# Patient Record
Sex: MALE | Race: WHITE | Employment: OTHER | URBAN - METROPOLITAN AREA
[De-identification: names, ages, dates, MRNs, and addresses within clinical notes are randomized per-mention and may not be internally consistent; named-entity substitution may affect disease eponyms.]

---

## 2019-09-04 ENCOUNTER — HOSPITAL ENCOUNTER (INPATIENT)
Age: 64
LOS: 5 days | Discharge: HOME | DRG: 418 | End: 2019-09-09
Attending: EMERGENCY MEDICINE | Admitting: INTERNAL MEDICINE
Payer: COMMERCIAL

## 2019-09-04 ENCOUNTER — APPOINTMENT (OUTPATIENT)
Dept: ULTRASOUND IMAGING | Age: 64
DRG: 418 | End: 2019-09-04
Payer: COMMERCIAL

## 2019-09-04 ENCOUNTER — APPOINTMENT (OUTPATIENT)
Dept: CT IMAGING | Age: 64
DRG: 418 | End: 2019-09-04
Payer: COMMERCIAL

## 2019-09-04 ENCOUNTER — APPOINTMENT (OUTPATIENT)
Dept: GENERAL RADIOLOGY | Age: 64
DRG: 418 | End: 2019-09-04
Payer: COMMERCIAL

## 2019-09-04 DIAGNOSIS — K85.90 PANCREATITIS, UNSPECIFIED PANCREATITIS TYPE: ICD-10-CM

## 2019-09-04 DIAGNOSIS — K29.90 GASTRITIS AND DUODENITIS: ICD-10-CM

## 2019-09-04 DIAGNOSIS — D72.829 LEUKOCYTOSIS, UNSPECIFIED TYPE: ICD-10-CM

## 2019-09-04 DIAGNOSIS — K85.90 ACUTE PANCREATITIS, UNSPECIFIED COMPLICATION STATUS, UNSPECIFIED PANCREATITIS TYPE: Primary | ICD-10-CM

## 2019-09-04 LAB
ALBUMIN SERPL-MCNC: 4.2 G/DL (ref 3.5–5.2)
ALP BLD-CCNC: 224 U/L (ref 40–129)
ALT SERPL-CCNC: 205 U/L (ref 0–40)
ANION GAP SERPL CALCULATED.3IONS-SCNC: 14 MMOL/L (ref 7–16)
AST SERPL-CCNC: 285 U/L (ref 0–39)
BASOPHILS ABSOLUTE: 0.11 E9/L (ref 0–0.2)
BASOPHILS RELATIVE PERCENT: 0.3 % (ref 0–2)
BILIRUB SERPL-MCNC: 2.4 MG/DL (ref 0–1.2)
BILIRUBIN DIRECT: 1.3 MG/DL (ref 0–0.3)
BILIRUBIN, INDIRECT: 1.1 MG/DL (ref 0–1)
BUN BLDV-MCNC: 19 MG/DL (ref 8–23)
CALCIUM SERPL-MCNC: 9.6 MG/DL (ref 8.6–10.2)
CHLORIDE BLD-SCNC: 95 MMOL/L (ref 98–107)
CO2: 29 MMOL/L (ref 22–29)
CREAT SERPL-MCNC: 0.7 MG/DL (ref 0.7–1.2)
D DIMER: >5250 NG/ML DDU
EOSINOPHILS ABSOLUTE: 0.07 E9/L (ref 0.05–0.5)
EOSINOPHILS RELATIVE PERCENT: 0.2 % (ref 0–6)
GFR AFRICAN AMERICAN: >60
GFR NON-AFRICAN AMERICAN: >60 ML/MIN/1.73
GLUCOSE BLD-MCNC: 227 MG/DL (ref 74–99)
HCT VFR BLD CALC: 58.5 % (ref 37–54)
HEMOGLOBIN: 19.2 G/DL (ref 12.5–16.5)
IMMATURE GRANULOCYTES #: 0.28 E9/L
IMMATURE GRANULOCYTES %: 0.9 % (ref 0–5)
LACTIC ACID: 2.6 MMOL/L (ref 0.5–2.2)
LIPASE: >3000 U/L (ref 13–60)
LYMPHOCYTES ABSOLUTE: 1.73 E9/L (ref 1.5–4)
LYMPHOCYTES RELATIVE PERCENT: 5.4 % (ref 20–42)
MCH RBC QN AUTO: 30 PG (ref 26–35)
MCHC RBC AUTO-ENTMCNC: 32.8 % (ref 32–34.5)
MCV RBC AUTO: 91.5 FL (ref 80–99.9)
MONOCYTES ABSOLUTE: 1.53 E9/L (ref 0.1–0.95)
MONOCYTES RELATIVE PERCENT: 4.8 % (ref 2–12)
NEUTROPHILS ABSOLUTE: 28.06 E9/L (ref 1.8–7.3)
NEUTROPHILS RELATIVE PERCENT: 88.4 % (ref 43–80)
PDW BLD-RTO: 12.5 FL (ref 11.5–15)
PLATELET # BLD: 202 E9/L (ref 130–450)
PMV BLD AUTO: 10.7 FL (ref 7–12)
POTASSIUM REFLEX MAGNESIUM: 5.5 MMOL/L (ref 3.5–5)
RBC # BLD: 6.39 E12/L (ref 3.8–5.8)
SODIUM BLD-SCNC: 138 MMOL/L (ref 132–146)
TOTAL PROTEIN: 7.5 G/DL (ref 6.4–8.3)
TROPONIN: <0.01 NG/ML (ref 0–0.03)
WBC # BLD: 31.8 E9/L (ref 4.5–11.5)

## 2019-09-04 PROCEDURE — 2580000003 HC RX 258: Performed by: INTERNAL MEDICINE

## 2019-09-04 PROCEDURE — 6360000004 HC RX CONTRAST MEDICATION: Performed by: RADIOLOGY

## 2019-09-04 PROCEDURE — 2580000003 HC RX 258: Performed by: EMERGENCY MEDICINE

## 2019-09-04 PROCEDURE — 71275 CT ANGIOGRAPHY CHEST: CPT

## 2019-09-04 PROCEDURE — 80048 BASIC METABOLIC PNL TOTAL CA: CPT

## 2019-09-04 PROCEDURE — 2500000003 HC RX 250 WO HCPCS: Performed by: EMERGENCY MEDICINE

## 2019-09-04 PROCEDURE — 6370000000 HC RX 637 (ALT 250 FOR IP): Performed by: EMERGENCY MEDICINE

## 2019-09-04 PROCEDURE — 96366 THER/PROPH/DIAG IV INF ADDON: CPT

## 2019-09-04 PROCEDURE — 2580000003 HC RX 258: Performed by: RADIOLOGY

## 2019-09-04 PROCEDURE — 6360000002 HC RX W HCPCS: Performed by: EMERGENCY MEDICINE

## 2019-09-04 PROCEDURE — 87040 BLOOD CULTURE FOR BACTERIA: CPT

## 2019-09-04 PROCEDURE — 83605 ASSAY OF LACTIC ACID: CPT

## 2019-09-04 PROCEDURE — 74177 CT ABD & PELVIS W/CONTRAST: CPT

## 2019-09-04 PROCEDURE — 2060000000 HC ICU INTERMEDIATE R&B

## 2019-09-04 PROCEDURE — 93005 ELECTROCARDIOGRAM TRACING: CPT | Performed by: EMERGENCY MEDICINE

## 2019-09-04 PROCEDURE — 96365 THER/PROPH/DIAG IV INF INIT: CPT

## 2019-09-04 PROCEDURE — 96375 TX/PRO/DX INJ NEW DRUG ADDON: CPT

## 2019-09-04 PROCEDURE — 85378 FIBRIN DEGRADE SEMIQUANT: CPT

## 2019-09-04 PROCEDURE — 36415 COLL VENOUS BLD VENIPUNCTURE: CPT

## 2019-09-04 PROCEDURE — 80076 HEPATIC FUNCTION PANEL: CPT

## 2019-09-04 PROCEDURE — 85025 COMPLETE CBC W/AUTO DIFF WBC: CPT

## 2019-09-04 PROCEDURE — 94664 DEMO&/EVAL PT USE INHALER: CPT

## 2019-09-04 PROCEDURE — 6360000002 HC RX W HCPCS: Performed by: INTERNAL MEDICINE

## 2019-09-04 PROCEDURE — 83690 ASSAY OF LIPASE: CPT

## 2019-09-04 PROCEDURE — 71046 X-RAY EXAM CHEST 2 VIEWS: CPT

## 2019-09-04 PROCEDURE — 96367 TX/PROPH/DG ADDL SEQ IV INF: CPT

## 2019-09-04 PROCEDURE — 99285 EMERGENCY DEPT VISIT HI MDM: CPT

## 2019-09-04 PROCEDURE — 84484 ASSAY OF TROPONIN QUANT: CPT

## 2019-09-04 PROCEDURE — 76705 ECHO EXAM OF ABDOMEN: CPT

## 2019-09-04 RX ORDER — 0.9 % SODIUM CHLORIDE 0.9 %
1000 INTRAVENOUS SOLUTION INTRAVENOUS ONCE
Status: COMPLETED | OUTPATIENT
Start: 2019-09-04 | End: 2019-09-04

## 2019-09-04 RX ORDER — SODIUM CHLORIDE 9 MG/ML
INJECTION, SOLUTION INTRAVENOUS CONTINUOUS
Status: DISCONTINUED | OUTPATIENT
Start: 2019-09-04 | End: 2019-09-05

## 2019-09-04 RX ORDER — HYDRALAZINE HYDROCHLORIDE 20 MG/ML
10 INJECTION INTRAMUSCULAR; INTRAVENOUS EVERY 6 HOURS PRN
Status: DISCONTINUED | OUTPATIENT
Start: 2019-09-04 | End: 2019-09-09 | Stop reason: HOSPADM

## 2019-09-04 RX ORDER — ALBUTEROL SULFATE 90 UG/1
2 AEROSOL, METERED RESPIRATORY (INHALATION) EVERY 6 HOURS PRN
Status: DISCONTINUED | OUTPATIENT
Start: 2019-09-04 | End: 2019-09-06

## 2019-09-04 RX ORDER — LABETALOL HYDROCHLORIDE 5 MG/ML
10 INJECTION, SOLUTION INTRAVENOUS EVERY 4 HOURS PRN
Status: DISCONTINUED | OUTPATIENT
Start: 2019-09-04 | End: 2019-09-09 | Stop reason: HOSPADM

## 2019-09-04 RX ORDER — SODIUM CHLORIDE 0.9 % (FLUSH) 0.9 %
10 SYRINGE (ML) INJECTION ONCE
Status: COMPLETED | OUTPATIENT
Start: 2019-09-04 | End: 2019-09-04

## 2019-09-04 RX ORDER — MORPHINE SULFATE 2 MG/ML
2 INJECTION, SOLUTION INTRAMUSCULAR; INTRAVENOUS EVERY 4 HOURS PRN
Status: DISCONTINUED | OUTPATIENT
Start: 2019-09-04 | End: 2019-09-05

## 2019-09-04 RX ORDER — ALBUTEROL SULFATE 2.5 MG/3ML
2.5 SOLUTION RESPIRATORY (INHALATION) EVERY 4 HOURS PRN
Status: DISCONTINUED | OUTPATIENT
Start: 2019-09-04 | End: 2019-09-09 | Stop reason: HOSPADM

## 2019-09-04 RX ORDER — LORAZEPAM 2 MG/ML
0.5 INJECTION INTRAMUSCULAR EVERY 6 HOURS PRN
Status: DISCONTINUED | OUTPATIENT
Start: 2019-09-04 | End: 2019-09-09 | Stop reason: HOSPADM

## 2019-09-04 RX ORDER — KETOROLAC TROMETHAMINE 30 MG/ML
15 INJECTION, SOLUTION INTRAMUSCULAR; INTRAVENOUS ONCE
Status: COMPLETED | OUTPATIENT
Start: 2019-09-04 | End: 2019-09-04

## 2019-09-04 RX ORDER — FENTANYL CITRATE 50 UG/ML
50 INJECTION, SOLUTION INTRAMUSCULAR; INTRAVENOUS ONCE
Status: COMPLETED | OUTPATIENT
Start: 2019-09-04 | End: 2019-09-04

## 2019-09-04 RX ORDER — NICOTINE 21 MG/24HR
1 PATCH, TRANSDERMAL 24 HOURS TRANSDERMAL ONCE
Status: COMPLETED | OUTPATIENT
Start: 2019-09-04 | End: 2019-09-05

## 2019-09-04 RX ORDER — 0.9 % SODIUM CHLORIDE 0.9 %
1000 INTRAVENOUS SOLUTION INTRAVENOUS ONCE
Status: COMPLETED | OUTPATIENT
Start: 2019-09-04 | End: 2019-09-05

## 2019-09-04 RX ORDER — ONDANSETRON 2 MG/ML
4 INJECTION INTRAMUSCULAR; INTRAVENOUS EVERY 6 HOURS PRN
Status: DISCONTINUED | OUTPATIENT
Start: 2019-09-04 | End: 2019-09-09 | Stop reason: HOSPADM

## 2019-09-04 RX ADMIN — MORPHINE SULFATE 2 MG: 2 INJECTION, SOLUTION INTRAMUSCULAR; INTRAVENOUS at 23:27

## 2019-09-04 RX ADMIN — Medication 10 ML: at 16:46

## 2019-09-04 RX ADMIN — IPRATROPIUM BROMIDE 0.5 MG: 0.5 SOLUTION RESPIRATORY (INHALATION) at 15:29

## 2019-09-04 RX ADMIN — KETOROLAC TROMETHAMINE 15 MG: 30 INJECTION, SOLUTION INTRAMUSCULAR; INTRAVENOUS at 15:32

## 2019-09-04 RX ADMIN — PIPERACILLIN SODIUM AND TAZOBACTAM SODIUM 3.38 G: 3; .375 INJECTION, POWDER, LYOPHILIZED, FOR SOLUTION INTRAVENOUS at 23:09

## 2019-09-04 RX ADMIN — FENTANYL CITRATE 50 MCG: 50 INJECTION INTRAMUSCULAR; INTRAVENOUS at 21:38

## 2019-09-04 RX ADMIN — SODIUM CHLORIDE 1000 ML: 9 INJECTION, SOLUTION INTRAVENOUS at 21:48

## 2019-09-04 RX ADMIN — IOPAMIDOL 110 ML: 755 INJECTION, SOLUTION INTRAVENOUS at 16:46

## 2019-09-04 RX ADMIN — SODIUM CHLORIDE: 9 INJECTION, SOLUTION INTRAVENOUS at 23:09

## 2019-09-04 RX ADMIN — LORAZEPAM 0.5 MG: 2 INJECTION INTRAMUSCULAR; INTRAVENOUS at 23:09

## 2019-09-04 RX ADMIN — ALBUTEROL SULFATE 2.5 MG: 2.5 SOLUTION RESPIRATORY (INHALATION) at 15:29

## 2019-09-04 RX ADMIN — CEFEPIME HYDROCHLORIDE 2 G: 2 INJECTION, POWDER, FOR SOLUTION INTRAVENOUS at 18:39

## 2019-09-04 RX ADMIN — SODIUM CHLORIDE 1000 ML: 9 INJECTION, SOLUTION INTRAVENOUS at 18:39

## 2019-09-04 RX ADMIN — METRONIDAZOLE 500 MG: 500 INJECTION, SOLUTION INTRAVENOUS at 21:42

## 2019-09-04 ASSESSMENT — PAIN DESCRIPTION - FREQUENCY
FREQUENCY: CONTINUOUS
FREQUENCY: CONTINUOUS

## 2019-09-04 ASSESSMENT — PAIN SCALES - GENERAL
PAINLEVEL_OUTOF10: 8
PAINLEVEL_OUTOF10: 5
PAINLEVEL_OUTOF10: 9
PAINLEVEL_OUTOF10: 5
PAINLEVEL_OUTOF10: 8
PAINLEVEL_OUTOF10: 9

## 2019-09-04 ASSESSMENT — PAIN DESCRIPTION - PAIN TYPE
TYPE: ACUTE PAIN
TYPE: ACUTE PAIN

## 2019-09-04 ASSESSMENT — PAIN DESCRIPTION - ORIENTATION
ORIENTATION: LEFT;UPPER;MID
ORIENTATION: UPPER;MID

## 2019-09-04 ASSESSMENT — PAIN DESCRIPTION - DESCRIPTORS
DESCRIPTORS: PRESSURE
DESCRIPTORS: ACHING;BURNING

## 2019-09-04 ASSESSMENT — PAIN DESCRIPTION - ONSET
ONSET: ON-GOING
ONSET: ON-GOING

## 2019-09-04 ASSESSMENT — PAIN DESCRIPTION - LOCATION
LOCATION: ABDOMEN;CHEST
LOCATION: ABDOMEN

## 2019-09-04 ASSESSMENT — PAIN DESCRIPTION - PROGRESSION: CLINICAL_PROGRESSION: NOT CHANGED

## 2019-09-04 NOTE — ED PROVIDER NOTES
There may also be cholecystitis with a   distended inflamed gallbladder. Multiple infiltrative hypodense lesions in the right hepatic lobe for   which benign and malignant etiologies are considered. Further   assessment and surveillance by MRI or PET/CT scan is recommended. CTA CHEST W CONTRAST   Final Result   No evidence of pulmonary embolism, organized pneumonia, pleural   effusion, or cardiac decompensation. Noncalcified and calcified granulomatous scarring is noted in the   lungs, with both lungs having nodules measuring up to 7 mm diameter. [place] criteria would recommend reevaluation in 6-12 months   to document stability. Mild aortic aneurysmal dilation without acute complications. Maximum   diameter measurements are currently in the 3.5 cm range. XR CHEST STANDARD (2 VW)   Final Result   NO ACUTE CARDIOPULMONARY PROCESS      Chronic obstructive pulmonary disease                EKG:  This EKG is signed and interpreted by the EP. Time: 1515  Rate: 71  Rhythm: normal sinus with occasional PACs   Interpretation: no acute changes  Comparison: no previous EKG available    ------------------------- NURSING NOTES AND VITALS REVIEWED ---------------------------   The nursing notes within the ED encounter and vital signs as below have been reviewed by myself. BP (!) 189/90   Pulse 67   Temp 97.8 °F (36.6 °C) (Temporal)   Resp 18   Ht 5' 11\" (1.803 m)   Wt 182 lb 14.4 oz (83 kg)   SpO2 97%   BMI 25.51 kg/m²   Oxygen Saturation Interpretation: Normal    The patients available past medical records and past encounters were reviewed.         ------------------------------ ED COURSE/MEDICAL DECISION MAKING----------------------  Medications   albuterol (PROVENTIL) nebulizer solution 2.5 mg (2.5 mg Nebulization Given 9/4/19 1049)   0.9 % sodium chloride bolus (1,000 mLs Intravenous New Bag 9/4/19 8306)   0.9 % sodium chloride infusion ( Intravenous New Bag 9/4/19 3849)

## 2019-09-05 LAB
ALBUMIN SERPL-MCNC: 3.3 G/DL (ref 3.5–5.2)
ALP BLD-CCNC: 172 U/L (ref 40–129)
ALT SERPL-CCNC: 462 U/L (ref 0–40)
ANION GAP SERPL CALCULATED.3IONS-SCNC: 12 MMOL/L (ref 7–16)
AST SERPL-CCNC: 409 U/L (ref 0–39)
BILIRUB SERPL-MCNC: 1 MG/DL (ref 0–1.2)
BUN BLDV-MCNC: 19 MG/DL (ref 8–23)
CALCIUM SERPL-MCNC: 8.7 MG/DL (ref 8.6–10.2)
CHLORIDE BLD-SCNC: 102 MMOL/L (ref 98–107)
CO2: 25 MMOL/L (ref 22–29)
CREAT SERPL-MCNC: 0.6 MG/DL (ref 0.7–1.2)
GFR AFRICAN AMERICAN: >60
GFR NON-AFRICAN AMERICAN: >60 ML/MIN/1.73
GLUCOSE BLD-MCNC: 110 MG/DL (ref 74–99)
HCT VFR BLD CALC: 45.5 % (ref 37–54)
HEMOGLOBIN: 14.8 G/DL (ref 12.5–16.5)
LACTIC ACID: 0.8 MMOL/L (ref 0.5–2.2)
LACTIC ACID: 0.8 MMOL/L (ref 0.5–2.2)
LACTIC ACID: 0.9 MMOL/L (ref 0.5–2.2)
LACTIC ACID: 1.2 MMOL/L (ref 0.5–2.2)
LIPASE: 136 U/L (ref 13–60)
MCH RBC QN AUTO: 29.7 PG (ref 26–35)
MCHC RBC AUTO-ENTMCNC: 32.5 % (ref 32–34.5)
MCV RBC AUTO: 91.4 FL (ref 80–99.9)
PDW BLD-RTO: 12.9 FL (ref 11.5–15)
PLATELET # BLD: 113 E9/L (ref 130–450)
PMV BLD AUTO: 10.6 FL (ref 7–12)
POTASSIUM SERPL-SCNC: 4.5 MMOL/L (ref 3.5–5)
PROCALCITONIN: 2.64 NG/ML (ref 0–0.08)
RBC # BLD: 4.98 E12/L (ref 3.8–5.8)
SODIUM BLD-SCNC: 139 MMOL/L (ref 132–146)
TOTAL PROTEIN: 5.5 G/DL (ref 6.4–8.3)
WBC # BLD: 11.5 E9/L (ref 4.5–11.5)

## 2019-09-05 PROCEDURE — 85027 COMPLETE CBC AUTOMATED: CPT

## 2019-09-05 PROCEDURE — 6370000000 HC RX 637 (ALT 250 FOR IP): Performed by: INTERNAL MEDICINE

## 2019-09-05 PROCEDURE — 83690 ASSAY OF LIPASE: CPT

## 2019-09-05 PROCEDURE — 36415 COLL VENOUS BLD VENIPUNCTURE: CPT

## 2019-09-05 PROCEDURE — 6360000002 HC RX W HCPCS: Performed by: SURGERY

## 2019-09-05 PROCEDURE — 2060000000 HC ICU INTERMEDIATE R&B

## 2019-09-05 PROCEDURE — 80053 COMPREHEN METABOLIC PANEL: CPT

## 2019-09-05 PROCEDURE — 83605 ASSAY OF LACTIC ACID: CPT

## 2019-09-05 PROCEDURE — 84145 PROCALCITONIN (PCT): CPT

## 2019-09-05 PROCEDURE — 2580000003 HC RX 258: Performed by: SURGERY

## 2019-09-05 PROCEDURE — 6360000002 HC RX W HCPCS: Performed by: INTERNAL MEDICINE

## 2019-09-05 PROCEDURE — 2580000003 HC RX 258: Performed by: INTERNAL MEDICINE

## 2019-09-05 RX ORDER — SODIUM CHLORIDE, SODIUM LACTATE, POTASSIUM CHLORIDE, CALCIUM CHLORIDE 600; 310; 30; 20 MG/100ML; MG/100ML; MG/100ML; MG/100ML
INJECTION, SOLUTION INTRAVENOUS CONTINUOUS
Status: DISCONTINUED | OUTPATIENT
Start: 2019-09-05 | End: 2019-09-09 | Stop reason: HOSPADM

## 2019-09-05 RX ORDER — NICOTINE 21 MG/24HR
1 PATCH, TRANSDERMAL 24 HOURS TRANSDERMAL DAILY
Status: DISCONTINUED | OUTPATIENT
Start: 2019-09-05 | End: 2019-09-09 | Stop reason: HOSPADM

## 2019-09-05 RX ORDER — KETOROLAC TROMETHAMINE 30 MG/ML
15 INJECTION, SOLUTION INTRAMUSCULAR; INTRAVENOUS EVERY 6 HOURS PRN
Status: DISPENSED | OUTPATIENT
Start: 2019-09-05 | End: 2019-09-08

## 2019-09-05 RX ADMIN — PIPERACILLIN SODIUM AND TAZOBACTAM SODIUM 3.38 G: 3; .375 INJECTION, POWDER, LYOPHILIZED, FOR SOLUTION INTRAVENOUS at 06:28

## 2019-09-05 RX ADMIN — SODIUM CHLORIDE, POTASSIUM CHLORIDE, SODIUM LACTATE AND CALCIUM CHLORIDE: 600; 310; 30; 20 INJECTION, SOLUTION INTRAVENOUS at 18:51

## 2019-09-05 RX ADMIN — PIPERACILLIN SODIUM AND TAZOBACTAM SODIUM 3.38 G: 3; .375 INJECTION, POWDER, LYOPHILIZED, FOR SOLUTION INTRAVENOUS at 23:46

## 2019-09-05 RX ADMIN — ONDANSETRON 4 MG: 2 INJECTION INTRAMUSCULAR; INTRAVENOUS at 03:08

## 2019-09-05 RX ADMIN — HYDROMORPHONE HYDROCHLORIDE 1 MG: 1 INJECTION, SOLUTION INTRAMUSCULAR; INTRAVENOUS; SUBCUTANEOUS at 18:46

## 2019-09-05 RX ADMIN — SODIUM CHLORIDE, POTASSIUM CHLORIDE, SODIUM LACTATE AND CALCIUM CHLORIDE: 600; 310; 30; 20 INJECTION, SOLUTION INTRAVENOUS at 11:56

## 2019-09-05 RX ADMIN — SODIUM CHLORIDE, POTASSIUM CHLORIDE, SODIUM LACTATE AND CALCIUM CHLORIDE: 600; 310; 30; 20 INJECTION, SOLUTION INTRAVENOUS at 06:27

## 2019-09-05 RX ADMIN — PIPERACILLIN SODIUM AND TAZOBACTAM SODIUM 3.38 G: 3; .375 INJECTION, POWDER, LYOPHILIZED, FOR SOLUTION INTRAVENOUS at 15:36

## 2019-09-05 RX ADMIN — HYDROMORPHONE HYDROCHLORIDE 1 MG: 1 INJECTION, SOLUTION INTRAMUSCULAR; INTRAVENOUS; SUBCUTANEOUS at 21:58

## 2019-09-05 RX ADMIN — MORPHINE SULFATE 2 MG: 2 INJECTION, SOLUTION INTRAMUSCULAR; INTRAVENOUS at 03:29

## 2019-09-05 RX ADMIN — SODIUM CHLORIDE: 9 INJECTION, SOLUTION INTRAVENOUS at 03:25

## 2019-09-05 RX ADMIN — HYDROMORPHONE HYDROCHLORIDE 1 MG: 1 INJECTION, SOLUTION INTRAMUSCULAR; INTRAVENOUS; SUBCUTANEOUS at 15:37

## 2019-09-05 RX ADMIN — HYDROMORPHONE HYDROCHLORIDE 1 MG: 1 INJECTION, SOLUTION INTRAMUSCULAR; INTRAVENOUS; SUBCUTANEOUS at 08:00

## 2019-09-05 RX ADMIN — HYDRALAZINE HYDROCHLORIDE 10 MG: 20 INJECTION INTRAMUSCULAR; INTRAVENOUS at 23:45

## 2019-09-05 RX ADMIN — MOMETASONE FUROATE AND FORMOTEROL FUMARATE DIHYDRATE 2 PUFF: 100; 5 AEROSOL RESPIRATORY (INHALATION) at 11:52

## 2019-09-05 RX ADMIN — KETOROLAC TROMETHAMINE 15 MG: 30 INJECTION, SOLUTION INTRAMUSCULAR at 09:55

## 2019-09-05 RX ADMIN — LORAZEPAM 0.5 MG: 2 INJECTION INTRAMUSCULAR; INTRAVENOUS at 23:50

## 2019-09-05 RX ADMIN — HYDROMORPHONE HYDROCHLORIDE 1 MG: 1 INJECTION, SOLUTION INTRAMUSCULAR; INTRAVENOUS; SUBCUTANEOUS at 11:59

## 2019-09-05 ASSESSMENT — PAIN DESCRIPTION - ORIENTATION
ORIENTATION: MID;UPPER

## 2019-09-05 ASSESSMENT — PAIN DESCRIPTION - LOCATION
LOCATION: ABDOMEN

## 2019-09-05 ASSESSMENT — PAIN DESCRIPTION - ONSET
ONSET: ON-GOING
ONSET: PROGRESSIVE
ONSET: ON-GOING

## 2019-09-05 ASSESSMENT — PAIN DESCRIPTION - FREQUENCY
FREQUENCY: CONTINUOUS

## 2019-09-05 ASSESSMENT — PAIN SCALES - GENERAL
PAINLEVEL_OUTOF10: 7
PAINLEVEL_OUTOF10: 10
PAINLEVEL_OUTOF10: 8
PAINLEVEL_OUTOF10: 7
PAINLEVEL_OUTOF10: 0
PAINLEVEL_OUTOF10: 3
PAINLEVEL_OUTOF10: 8
PAINLEVEL_OUTOF10: 10
PAINLEVEL_OUTOF10: 0
PAINLEVEL_OUTOF10: 3

## 2019-09-05 ASSESSMENT — PAIN DESCRIPTION - PROGRESSION
CLINICAL_PROGRESSION: NOT CHANGED

## 2019-09-05 ASSESSMENT — PAIN - FUNCTIONAL ASSESSMENT
PAIN_FUNCTIONAL_ASSESSMENT: PREVENTS OR INTERFERES WITH MANY ACTIVE NOT PASSIVE ACTIVITIES
PAIN_FUNCTIONAL_ASSESSMENT: PREVENTS OR INTERFERES SOME ACTIVE ACTIVITIES AND ADLS

## 2019-09-05 ASSESSMENT — PAIN DESCRIPTION - DESCRIPTORS
DESCRIPTORS: PRESSURE;ACHING
DESCRIPTORS: PRESSURE
DESCRIPTORS: PRESSURE;ACHING;CRAMPING
DESCRIPTORS: PRESSURE;ACHING
DESCRIPTORS: PRESSURE;ACHING

## 2019-09-05 ASSESSMENT — PAIN DESCRIPTION - PAIN TYPE
TYPE: ACUTE PAIN

## 2019-09-05 NOTE — H&P
Monocytes Absolute 1.53 (H) 0.10 - 0.95 E9/L    Eosinophils Absolute 0.07 0.05 - 0.50 E9/L    Basophils Absolute 0.11 0.00 - 0.20 W8/R   Basic Metabolic Panel w/ Reflex to MG    Collection Time: 09/04/19  3:30 PM   Result Value Ref Range    Sodium 138 132 - 146 mmol/L    Potassium reflex Magnesium 5.5 (H) 3.5 - 5.0 mmol/L    Chloride 95 (L) 98 - 107 mmol/L    CO2 29 22 - 29 mmol/L    Anion Gap 14 7 - 16 mmol/L    Glucose 227 (H) 74 - 99 mg/dL    BUN 19 8 - 23 mg/dL    CREATININE 0.7 0.7 - 1.2 mg/dL    GFR Non-African American >60 >=60 mL/min/1.73    GFR African American >60     Calcium 9.6 8.6 - 10.2 mg/dL   Hepatic Function Panel    Collection Time: 09/04/19  3:30 PM   Result Value Ref Range    Total Protein 7.5 6.4 - 8.3 g/dL    Alb 4.2 3.5 - 5.2 g/dL    Alkaline Phosphatase 224 (H) 40 - 129 U/L     (H) 0 - 40 U/L     (H) 0 - 39 U/L    Total Bilirubin 2.4 (H) 0.0 - 1.2 mg/dL    Bilirubin, Direct 1.3 (H) 0.0 - 0.3 mg/dL    Bilirubin, Indirect 1.1 (H) 0.0 - 1.0 mg/dL   Lipase    Collection Time: 09/04/19  3:30 PM   Result Value Ref Range    Lipase >3,000 (H) 13 - 60 U/L   Troponin    Collection Time: 09/04/19  3:30 PM   Result Value Ref Range    Troponin <0.01 0.00 - 0.03 ng/mL   D-Dimer, Quantitative    Collection Time: 09/04/19  3:30 PM   Result Value Ref Range    D-Dimer, Quant >5250 ng/mL DDU   Lactic Acid, Plasma    Collection Time: 09/04/19  6:30 PM   Result Value Ref Range    Lactic Acid 2.6 (H) 0.5 - 2.2 mmol/L   Lactic acid, plasma    Collection Time: 09/04/19 11:48 PM   Result Value Ref Range    Lactic Acid 1.2 0.5 - 2.2 mmol/L   Lactic acid, plasma    Collection Time: 09/05/19  6:44 AM   Result Value Ref Range    Lactic Acid 0.9 0.5 - 2.2 mmol/L   Lipase    Collection Time: 09/05/19  6:44 AM   Result Value Ref Range    Lipase 136 (H) 13 - 60 U/L       US ABDOMEN LIMITED   Final Result   Cholelithiasis.           CT ABDOMEN PELVIS W IV CONTRAST Additional Contrast? None   Final Result Inflammatory changes with edema , wall thickening and enhancement of   the pylorus, duodenum with inflammation extending to the pancreatic   head and uncinate processes concerning for   gastroduodenitis/pancreatitis. There may also be cholecystitis with a   distended inflamed gallbladder. Multiple infiltrative hypodense lesions in the right hepatic lobe for   which benign and malignant etiologies are considered. Further   assessment and surveillance by MRI or PET/CT scan is recommended. CTA CHEST W CONTRAST   Final Result   No evidence of pulmonary embolism, organized pneumonia, pleural   effusion, or cardiac decompensation. Noncalcified and calcified granulomatous scarring is noted in the   lungs, with both lungs having nodules measuring up to 7 mm diameter. [place] [facility] criteria would recommend reevaluation in 6-12 months   to document stability. Mild aortic aneurysmal dilation without acute complications. Maximum   diameter measurements are currently in the 3.5 cm range. XR CHEST STANDARD (2 VW)   Final Result   NO ACUTE CARDIOPULMONARY PROCESS      Chronic obstructive pulmonary disease                  ASSESSMENT :      Active Problems:    Pancreatitis, unspecified pancreatitis type  Resolved Problems:    * No resolved hospital problems. *  Acute pancreatitis seems to be gallstone related  Leukocytosis, lactic acidosis rule out cholangitis  Significant tobacco abuse  Coronary artery disease without angina  Depression  Hyperlipidemia  BPH    Plan :    Await cultures before discontinuing the antibiotics  IV hydration  Pain control  Bowel rest  General surgery on the case  Monitor LFTs  Check procalcitonin level    Electronically signed by Peterson Porter MD on 9/5/2019 at 11:44 AM    NOTE: This report was transcribed using voice recognition software.  Every effort was made to ensure accuracy; however, inadvertent transcription errors may be present

## 2019-09-06 ENCOUNTER — APPOINTMENT (OUTPATIENT)
Dept: GENERAL RADIOLOGY | Age: 64
DRG: 418 | End: 2019-09-06
Payer: COMMERCIAL

## 2019-09-06 ENCOUNTER — APPOINTMENT (OUTPATIENT)
Dept: NUCLEAR MEDICINE | Age: 64
DRG: 418 | End: 2019-09-06
Payer: COMMERCIAL

## 2019-09-06 LAB
ALBUMIN SERPL-MCNC: 3.9 G/DL (ref 3.5–5.2)
ALP BLD-CCNC: 174 U/L (ref 40–129)
ALT SERPL-CCNC: 362 U/L (ref 0–40)
ANION GAP SERPL CALCULATED.3IONS-SCNC: 11 MMOL/L (ref 7–16)
AST SERPL-CCNC: 187 U/L (ref 0–39)
BASOPHILS ABSOLUTE: 0.03 E9/L (ref 0–0.2)
BASOPHILS RELATIVE PERCENT: 0.3 % (ref 0–2)
BILIRUB SERPL-MCNC: 1.3 MG/DL (ref 0–1.2)
BUN BLDV-MCNC: 13 MG/DL (ref 8–23)
CALCIUM SERPL-MCNC: 9.1 MG/DL (ref 8.6–10.2)
CHLORIDE BLD-SCNC: 99 MMOL/L (ref 98–107)
CO2: 30 MMOL/L (ref 22–29)
CREAT SERPL-MCNC: 0.6 MG/DL (ref 0.7–1.2)
EKG ATRIAL RATE: 78 BPM
EKG P AXIS: 76 DEGREES
EKG P-R INTERVAL: 128 MS
EKG Q-T INTERVAL: 404 MS
EKG QRS DURATION: 86 MS
EKG QTC CALCULATION (BAZETT): 439 MS
EKG R AXIS: 81 DEGREES
EKG T AXIS: 76 DEGREES
EKG VENTRICULAR RATE: 71 BPM
EOSINOPHILS ABSOLUTE: 0.01 E9/L (ref 0.05–0.5)
EOSINOPHILS RELATIVE PERCENT: 0.1 % (ref 0–6)
GFR AFRICAN AMERICAN: >60
GFR NON-AFRICAN AMERICAN: >60 ML/MIN/1.73
GLUCOSE BLD-MCNC: 105 MG/DL (ref 74–99)
HCT VFR BLD CALC: 45.7 % (ref 37–54)
HEMOGLOBIN: 14.7 G/DL (ref 12.5–16.5)
IMMATURE GRANULOCYTES #: 0.08 E9/L
IMMATURE GRANULOCYTES %: 0.7 % (ref 0–5)
LIPASE: 18 U/L (ref 13–60)
LYMPHOCYTES ABSOLUTE: 1.11 E9/L (ref 1.5–4)
LYMPHOCYTES RELATIVE PERCENT: 9.7 % (ref 20–42)
MCH RBC QN AUTO: 29.6 PG (ref 26–35)
MCHC RBC AUTO-ENTMCNC: 32.2 % (ref 32–34.5)
MCV RBC AUTO: 92 FL (ref 80–99.9)
MONOCYTES ABSOLUTE: 0.89 E9/L (ref 0.1–0.95)
MONOCYTES RELATIVE PERCENT: 7.8 % (ref 2–12)
NEUTROPHILS ABSOLUTE: 9.3 E9/L (ref 1.8–7.3)
NEUTROPHILS RELATIVE PERCENT: 81.4 % (ref 43–80)
PDW BLD-RTO: 12.9 FL (ref 11.5–15)
PLATELET # BLD: 109 E9/L (ref 130–450)
PMV BLD AUTO: 10.8 FL (ref 7–12)
POTASSIUM SERPL-SCNC: 3.9 MMOL/L (ref 3.5–5)
RBC # BLD: 4.97 E12/L (ref 3.8–5.8)
SODIUM BLD-SCNC: 140 MMOL/L (ref 132–146)
TOTAL PROTEIN: 6.3 G/DL (ref 6.4–8.3)
WBC # BLD: 11.4 E9/L (ref 4.5–11.5)

## 2019-09-06 PROCEDURE — 93010 ELECTROCARDIOGRAM REPORT: CPT | Performed by: INTERNAL MEDICINE

## 2019-09-06 PROCEDURE — 6360000002 HC RX W HCPCS: Performed by: INTERNAL MEDICINE

## 2019-09-06 PROCEDURE — 83690 ASSAY OF LIPASE: CPT

## 2019-09-06 PROCEDURE — 2580000003 HC RX 258: Performed by: INTERNAL MEDICINE

## 2019-09-06 PROCEDURE — 94640 AIRWAY INHALATION TREATMENT: CPT

## 2019-09-06 PROCEDURE — 78226 HEPATOBILIARY SYSTEM IMAGING: CPT

## 2019-09-06 PROCEDURE — 6360000002 HC RX W HCPCS: Performed by: EMERGENCY MEDICINE

## 2019-09-06 PROCEDURE — 2580000003 HC RX 258: Performed by: SURGERY

## 2019-09-06 PROCEDURE — 2060000000 HC ICU INTERMEDIATE R&B

## 2019-09-06 PROCEDURE — A9537 TC99M MEBROFENIN: HCPCS | Performed by: RADIOLOGY

## 2019-09-06 PROCEDURE — 36415 COLL VENOUS BLD VENIPUNCTURE: CPT

## 2019-09-06 PROCEDURE — 85025 COMPLETE CBC W/AUTO DIFF WBC: CPT

## 2019-09-06 PROCEDURE — 6370000000 HC RX 637 (ALT 250 FOR IP): Performed by: INTERNAL MEDICINE

## 2019-09-06 PROCEDURE — 2700000000 HC OXYGEN THERAPY PER DAY

## 2019-09-06 PROCEDURE — 71046 X-RAY EXAM CHEST 2 VIEWS: CPT

## 2019-09-06 PROCEDURE — 6360000002 HC RX W HCPCS: Performed by: SURGERY

## 2019-09-06 PROCEDURE — 80053 COMPREHEN METABOLIC PANEL: CPT

## 2019-09-06 PROCEDURE — 3430000000 HC RX DIAGNOSTIC RADIOPHARMACEUTICAL: Performed by: RADIOLOGY

## 2019-09-06 RX ADMIN — PIPERACILLIN SODIUM AND TAZOBACTAM SODIUM 3.38 G: 3; .375 INJECTION, POWDER, LYOPHILIZED, FOR SOLUTION INTRAVENOUS at 15:16

## 2019-09-06 RX ADMIN — SODIUM CHLORIDE, POTASSIUM CHLORIDE, SODIUM LACTATE AND CALCIUM CHLORIDE: 600; 310; 30; 20 INJECTION, SOLUTION INTRAVENOUS at 08:59

## 2019-09-06 RX ADMIN — HYDROMORPHONE HYDROCHLORIDE 1 MG: 1 INJECTION, SOLUTION INTRAMUSCULAR; INTRAVENOUS; SUBCUTANEOUS at 05:40

## 2019-09-06 RX ADMIN — HYDROMORPHONE HYDROCHLORIDE 1 MG: 1 INJECTION, SOLUTION INTRAMUSCULAR; INTRAVENOUS; SUBCUTANEOUS at 10:37

## 2019-09-06 RX ADMIN — LORAZEPAM 0.5 MG: 2 INJECTION INTRAMUSCULAR; INTRAVENOUS at 16:29

## 2019-09-06 RX ADMIN — HYDROMORPHONE HYDROCHLORIDE 1 MG: 1 INJECTION, SOLUTION INTRAMUSCULAR; INTRAVENOUS; SUBCUTANEOUS at 01:59

## 2019-09-06 RX ADMIN — KETOROLAC TROMETHAMINE 15 MG: 30 INJECTION, SOLUTION INTRAMUSCULAR at 08:38

## 2019-09-06 RX ADMIN — HYDROMORPHONE HYDROCHLORIDE 1 MG: 1 INJECTION, SOLUTION INTRAMUSCULAR; INTRAVENOUS; SUBCUTANEOUS at 18:16

## 2019-09-06 RX ADMIN — Medication 8 MILLICURIE: at 14:15

## 2019-09-06 RX ADMIN — MOMETASONE FUROATE AND FORMOTEROL FUMARATE DIHYDRATE 2 PUFF: 100; 5 AEROSOL RESPIRATORY (INHALATION) at 08:39

## 2019-09-06 RX ADMIN — HYDROMORPHONE HYDROCHLORIDE 1 MG: 1 INJECTION, SOLUTION INTRAMUSCULAR; INTRAVENOUS; SUBCUTANEOUS at 15:15

## 2019-09-06 RX ADMIN — LORAZEPAM 0.5 MG: 2 INJECTION INTRAMUSCULAR; INTRAVENOUS at 08:36

## 2019-09-06 RX ADMIN — SODIUM CHLORIDE, POTASSIUM CHLORIDE, SODIUM LACTATE AND CALCIUM CHLORIDE: 600; 310; 30; 20 INJECTION, SOLUTION INTRAVENOUS at 16:29

## 2019-09-06 RX ADMIN — PIPERACILLIN SODIUM AND TAZOBACTAM SODIUM 3.38 G: 3; .375 INJECTION, POWDER, LYOPHILIZED, FOR SOLUTION INTRAVENOUS at 06:43

## 2019-09-06 RX ADMIN — HYDROMORPHONE HYDROCHLORIDE 1 MG: 1 INJECTION, SOLUTION INTRAMUSCULAR; INTRAVENOUS; SUBCUTANEOUS at 21:59

## 2019-09-06 RX ADMIN — SODIUM CHLORIDE, POTASSIUM CHLORIDE, SODIUM LACTATE AND CALCIUM CHLORIDE: 600; 310; 30; 20 INJECTION, SOLUTION INTRAVENOUS at 01:05

## 2019-09-06 RX ADMIN — LORAZEPAM 0.5 MG: 2 INJECTION INTRAMUSCULAR; INTRAVENOUS at 21:59

## 2019-09-06 RX ADMIN — PIPERACILLIN SODIUM AND TAZOBACTAM SODIUM 3.38 G: 3; .375 INJECTION, POWDER, LYOPHILIZED, FOR SOLUTION INTRAVENOUS at 23:08

## 2019-09-06 RX ADMIN — MOMETASONE FUROATE AND FORMOTEROL FUMARATE DIHYDRATE 2 PUFF: 100; 5 AEROSOL RESPIRATORY (INHALATION) at 21:58

## 2019-09-06 RX ADMIN — ALBUTEROL SULFATE 2.5 MG: 2.5 SOLUTION RESPIRATORY (INHALATION) at 11:00

## 2019-09-06 ASSESSMENT — PAIN DESCRIPTION - LOCATION
LOCATION: ABDOMEN

## 2019-09-06 ASSESSMENT — PAIN DESCRIPTION - PAIN TYPE
TYPE: ACUTE PAIN

## 2019-09-06 ASSESSMENT — PAIN DESCRIPTION - ONSET
ONSET: ON-GOING

## 2019-09-06 ASSESSMENT — PAIN DESCRIPTION - ORIENTATION
ORIENTATION: MID;UPPER
ORIENTATION: MID;UPPER
ORIENTATION: UPPER

## 2019-09-06 ASSESSMENT — PAIN DESCRIPTION - PROGRESSION
CLINICAL_PROGRESSION: NOT CHANGED
CLINICAL_PROGRESSION: GRADUALLY IMPROVING
CLINICAL_PROGRESSION: NOT CHANGED
CLINICAL_PROGRESSION: NOT CHANGED
CLINICAL_PROGRESSION: GRADUALLY IMPROVING

## 2019-09-06 ASSESSMENT — PAIN DESCRIPTION - FREQUENCY
FREQUENCY: CONTINUOUS

## 2019-09-06 ASSESSMENT — PAIN SCALES - GENERAL
PAINLEVEL_OUTOF10: 8
PAINLEVEL_OUTOF10: 7
PAINLEVEL_OUTOF10: 0
PAINLEVEL_OUTOF10: 7
PAINLEVEL_OUTOF10: 5
PAINLEVEL_OUTOF10: 7
PAINLEVEL_OUTOF10: 6
PAINLEVEL_OUTOF10: 5
PAINLEVEL_OUTOF10: 8
PAINLEVEL_OUTOF10: 0
PAINLEVEL_OUTOF10: 8

## 2019-09-06 ASSESSMENT — PAIN DESCRIPTION - DESCRIPTORS
DESCRIPTORS: DULL
DESCRIPTORS: DULL
DESCRIPTORS: PRESSURE;ACHING;CRAMPING
DESCRIPTORS: DULL
DESCRIPTORS: DULL
DESCRIPTORS: PRESSURE;ACHING;DISCOMFORT
DESCRIPTORS: DULL
DESCRIPTORS: DULL

## 2019-09-06 NOTE — CONSULTS
Department of Internal Medicine  Infectious Diseases   Consult Note      Reason for Consult: Ac cholangitis , elevated procalcitonin     Requesting Physician: Dr Almyra Ormond:                This is a 60 yrs old male presented to the ER with acute abdomen pain ( epigastric ) X 1 days , associated with nausea , diaphoresis . Pain started after he had meal. He denied diarrhea , fever and chills . He developed leukocytosis of 31K,  procalcitonin 2.64, lipase > 3000 , , , Alk P 224, T constanza 2.4 . CT abdomen and pelvis -pancreatic inflammation and gall bladder wall thickening . He was started on IV zosyn .        Past Medical History:      Past Medical History:   Diagnosis Date    Depression     Enlarged prostate without lower urinary tract symptoms (luts)     Hyperlipidemia     Hypertension     Vitamin D deficiency        Past Surgical History:      None     Current Medications:      Current Facility-Administered Medications   Medication Dose Route Frequency Provider Last Rate Last Dose    lactated ringers infusion   Intravenous Continuous Sonia E Kaercher,  mL/hr at 09/06/19 0859      ketorolac (TORADOL) injection 15 mg  15 mg Intravenous Q6H PRN Steffanie Roche MD   15 mg at 09/06/19 0838    HYDROmorphone (DILAUDID) injection 0.5 mg  0.5 mg Intravenous Q3H PRN Steffanie Roche MD        Or    HYDROmorphone (DILAUDID) injection 1 mg  1 mg Intravenous Q3H PRN Steffanie Roche MD   1 mg at 09/06/19 1037    nicotine (NICODERM CQ) 14 MG/24HR 1 patch  1 patch Transdermal Daily Alise Berrios MD   1 patch at 09/05/19 2157    albuterol (PROVENTIL) nebulizer solution 2.5 mg  2.5 mg Nebulization Q4H PRN Vear Castor, DO   2.5 mg at 09/06/19 1100    ondansetron (ZOFRAN) injection 4 mg  4 mg Intravenous Q6H PRN Alise Berrios MD   4 mg at 09/05/19 0308    hydrALAZINE (APRESOLINE) injection 10 mg  10 mg Intravenous Q6H PRN Alise Berrios MD   10 mg at of current or ex partner: Not on file     Emotionally abused: Not on file     Physically abused: Not on file     Forced sexual activity: Not on file   Other Topics Concern    Not on file   Social History Narrative    Not on file         Family History:     Not pertinent to present illness      REVIEW OF SYSTEMS:      CONSTITUTIONAL:  Denies fever, chill or rigors  HEENT: denies blurring of vision or double vision, denies hearing problem  RESPIRATORY: denies cough, shortness of breath, sputum expectoration, chest pain. CARDIOVASCULAR:  Denies palpitation  GASTROINTESTINAL:  Abdomen pain   GENITOURINARY:  Denies burning urination or frequency of urination  INTEGUMENT: denies wound , rash  HEMATOLOGIC/LYMPHATIC:  Denies lymph node swelling, gum bleeding or easy bruising. MUSCULOSKELETAL:  Denies leg pain , joint pain , joint swelling  NEUROLOGICAL:  Denies light headed, dizziness, loss of consciousness, weakness of lower extremities, bowel or bladder incontinence. PHYSICAL EXAM:      Vitals:     Vitals:    09/06/19 1115   BP: (!) 151/83   Pulse: 84   Resp: 18   Temp: 99.5 °F (37.5 °C)   SpO2: 94%       General Appearance:    Awake, alert , no acute distress. Head:    Normocephalic, atraumatic   Eyes:    No pallor, no icterus,   Ears:    No obvious deformity or drainage.    Nose:   No nasal drainage   Throat:   Mucosa moist, no oral thrush   Neck:   Supple, no lymphadenopathy   Back:     no CVA tenderness   Lungs:     Clear to auscultation bilaterally, no wheeze    Heart:    Regular rate and rhythm, no murmur, rub or gallop   Abdomen:     Soft, tender epigastric region, no rebound , bowel sounds present , no Liriano's sign    Extremities:   No edema, no tenderness    Pulses:   Dorsalis pedis palpable    Skin:   no rashes or lesions     CBC with Differential:      Lab Results   Component Value Date    WBC 11.4 09/06/2019    RBC 4.97 09/06/2019    HGB 14.7 09/06/2019    HCT 45.7 09/06/2019     09/06/2019

## 2019-09-07 ENCOUNTER — ANESTHESIA EVENT (OUTPATIENT)
Dept: OPERATING ROOM | Age: 64
DRG: 418 | End: 2019-09-07
Payer: COMMERCIAL

## 2019-09-07 LAB
ALBUMIN SERPL-MCNC: 3.5 G/DL (ref 3.5–5.2)
ALP BLD-CCNC: 155 U/L (ref 40–129)
ALT SERPL-CCNC: 211 U/L (ref 0–40)
ANION GAP SERPL CALCULATED.3IONS-SCNC: 12 MMOL/L (ref 7–16)
AST SERPL-CCNC: 68 U/L (ref 0–39)
BASOPHILS ABSOLUTE: 0.02 E9/L (ref 0–0.2)
BASOPHILS RELATIVE PERCENT: 0.2 % (ref 0–2)
BILIRUB SERPL-MCNC: 1.3 MG/DL (ref 0–1.2)
BUN BLDV-MCNC: 9 MG/DL (ref 8–23)
C-REACTIVE PROTEIN: 8.3 MG/DL (ref 0–0.4)
CALCIUM SERPL-MCNC: 8.7 MG/DL (ref 8.6–10.2)
CHLORIDE BLD-SCNC: 98 MMOL/L (ref 98–107)
CO2: 29 MMOL/L (ref 22–29)
CREAT SERPL-MCNC: 0.6 MG/DL (ref 0.7–1.2)
EOSINOPHILS ABSOLUTE: 0.03 E9/L (ref 0.05–0.5)
EOSINOPHILS RELATIVE PERCENT: 0.3 % (ref 0–6)
GFR AFRICAN AMERICAN: >60
GFR NON-AFRICAN AMERICAN: >60 ML/MIN/1.73
GLUCOSE BLD-MCNC: 94 MG/DL (ref 74–99)
HCT VFR BLD CALC: 43.1 % (ref 37–54)
HEMOGLOBIN: 14 G/DL (ref 12.5–16.5)
IMMATURE GRANULOCYTES #: 0.05 E9/L
IMMATURE GRANULOCYTES %: 0.5 % (ref 0–5)
LIPASE: 13 U/L (ref 13–60)
LYMPHOCYTES ABSOLUTE: 1.26 E9/L (ref 1.5–4)
LYMPHOCYTES RELATIVE PERCENT: 13.2 % (ref 20–42)
MCH RBC QN AUTO: 29.7 PG (ref 26–35)
MCHC RBC AUTO-ENTMCNC: 32.5 % (ref 32–34.5)
MCV RBC AUTO: 91.5 FL (ref 80–99.9)
MONOCYTES ABSOLUTE: 0.91 E9/L (ref 0.1–0.95)
MONOCYTES RELATIVE PERCENT: 9.5 % (ref 2–12)
NEUTROPHILS ABSOLUTE: 7.31 E9/L (ref 1.8–7.3)
NEUTROPHILS RELATIVE PERCENT: 76.3 % (ref 43–80)
PDW BLD-RTO: 12.5 FL (ref 11.5–15)
PLATELET # BLD: 102 E9/L (ref 130–450)
PMV BLD AUTO: 11.1 FL (ref 7–12)
POTASSIUM SERPL-SCNC: 3.9 MMOL/L (ref 3.5–5)
PROCALCITONIN: 0.66 NG/ML (ref 0–0.08)
RBC # BLD: 4.71 E12/L (ref 3.8–5.8)
SODIUM BLD-SCNC: 139 MMOL/L (ref 132–146)
TOTAL PROTEIN: 5.8 G/DL (ref 6.4–8.3)
WBC # BLD: 9.6 E9/L (ref 4.5–11.5)

## 2019-09-07 PROCEDURE — 2580000003 HC RX 258: Performed by: INTERNAL MEDICINE

## 2019-09-07 PROCEDURE — 2700000000 HC OXYGEN THERAPY PER DAY

## 2019-09-07 PROCEDURE — 2060000000 HC ICU INTERMEDIATE R&B

## 2019-09-07 PROCEDURE — 6370000000 HC RX 637 (ALT 250 FOR IP): Performed by: INTERNAL MEDICINE

## 2019-09-07 PROCEDURE — 80053 COMPREHEN METABOLIC PANEL: CPT

## 2019-09-07 PROCEDURE — 94640 AIRWAY INHALATION TREATMENT: CPT

## 2019-09-07 PROCEDURE — 36415 COLL VENOUS BLD VENIPUNCTURE: CPT

## 2019-09-07 PROCEDURE — 84145 PROCALCITONIN (PCT): CPT

## 2019-09-07 PROCEDURE — 6360000002 HC RX W HCPCS: Performed by: SURGERY

## 2019-09-07 PROCEDURE — 86140 C-REACTIVE PROTEIN: CPT

## 2019-09-07 PROCEDURE — 85025 COMPLETE CBC W/AUTO DIFF WBC: CPT

## 2019-09-07 PROCEDURE — 6360000002 HC RX W HCPCS: Performed by: INTERNAL MEDICINE

## 2019-09-07 PROCEDURE — 83690 ASSAY OF LIPASE: CPT

## 2019-09-07 RX ORDER — IPRATROPIUM BROMIDE AND ALBUTEROL SULFATE 2.5; .5 MG/3ML; MG/3ML
1 SOLUTION RESPIRATORY (INHALATION)
Status: DISCONTINUED | OUTPATIENT
Start: 2019-09-07 | End: 2019-09-09 | Stop reason: HOSPADM

## 2019-09-07 RX ADMIN — IPRATROPIUM BROMIDE AND ALBUTEROL SULFATE 1 AMPULE: .5; 3 SOLUTION RESPIRATORY (INHALATION) at 16:20

## 2019-09-07 RX ADMIN — SODIUM CHLORIDE, POTASSIUM CHLORIDE, SODIUM LACTATE AND CALCIUM CHLORIDE: 600; 310; 30; 20 INJECTION, SOLUTION INTRAVENOUS at 09:37

## 2019-09-07 RX ADMIN — SODIUM CHLORIDE, POTASSIUM CHLORIDE, SODIUM LACTATE AND CALCIUM CHLORIDE: 600; 310; 30; 20 INJECTION, SOLUTION INTRAVENOUS at 23:45

## 2019-09-07 RX ADMIN — HYDROMORPHONE HYDROCHLORIDE 1 MG: 1 INJECTION, SOLUTION INTRAMUSCULAR; INTRAVENOUS; SUBCUTANEOUS at 00:59

## 2019-09-07 RX ADMIN — HYDROMORPHONE HYDROCHLORIDE 1 MG: 1 INJECTION, SOLUTION INTRAMUSCULAR; INTRAVENOUS; SUBCUTANEOUS at 23:46

## 2019-09-07 RX ADMIN — PIPERACILLIN SODIUM AND TAZOBACTAM SODIUM 3.38 G: 3; .375 INJECTION, POWDER, LYOPHILIZED, FOR SOLUTION INTRAVENOUS at 06:45

## 2019-09-07 RX ADMIN — IPRATROPIUM BROMIDE AND ALBUTEROL SULFATE 1 AMPULE: .5; 3 SOLUTION RESPIRATORY (INHALATION) at 19:29

## 2019-09-07 RX ADMIN — LORAZEPAM 0.5 MG: 2 INJECTION INTRAMUSCULAR; INTRAVENOUS at 21:43

## 2019-09-07 RX ADMIN — HYDROMORPHONE HYDROCHLORIDE 1 MG: 1 INJECTION, SOLUTION INTRAMUSCULAR; INTRAVENOUS; SUBCUTANEOUS at 11:03

## 2019-09-07 RX ADMIN — HYDROMORPHONE HYDROCHLORIDE 1 MG: 1 INJECTION, SOLUTION INTRAMUSCULAR; INTRAVENOUS; SUBCUTANEOUS at 14:22

## 2019-09-07 RX ADMIN — HYDROMORPHONE HYDROCHLORIDE 1 MG: 1 INJECTION, SOLUTION INTRAMUSCULAR; INTRAVENOUS; SUBCUTANEOUS at 20:39

## 2019-09-07 RX ADMIN — HYDROMORPHONE HYDROCHLORIDE 1 MG: 1 INJECTION, SOLUTION INTRAMUSCULAR; INTRAVENOUS; SUBCUTANEOUS at 17:39

## 2019-09-07 RX ADMIN — MOMETASONE FUROATE AND FORMOTEROL FUMARATE DIHYDRATE 2 PUFF: 100; 5 AEROSOL RESPIRATORY (INHALATION) at 09:36

## 2019-09-07 RX ADMIN — HYDROMORPHONE HYDROCHLORIDE 1 MG: 1 INJECTION, SOLUTION INTRAMUSCULAR; INTRAVENOUS; SUBCUTANEOUS at 05:48

## 2019-09-07 RX ADMIN — IPRATROPIUM BROMIDE AND ALBUTEROL SULFATE 1 AMPULE: .5; 3 SOLUTION RESPIRATORY (INHALATION) at 13:52

## 2019-09-07 RX ADMIN — LORAZEPAM 0.5 MG: 2 INJECTION INTRAMUSCULAR; INTRAVENOUS at 05:48

## 2019-09-07 RX ADMIN — MOMETASONE FUROATE AND FORMOTEROL FUMARATE DIHYDRATE 2 PUFF: 100; 5 AEROSOL RESPIRATORY (INHALATION) at 20:36

## 2019-09-07 RX ADMIN — PIPERACILLIN SODIUM AND TAZOBACTAM SODIUM 3.38 G: 3; .375 INJECTION, POWDER, LYOPHILIZED, FOR SOLUTION INTRAVENOUS at 23:46

## 2019-09-07 RX ADMIN — PIPERACILLIN SODIUM AND TAZOBACTAM SODIUM 3.38 G: 3; .375 INJECTION, POWDER, LYOPHILIZED, FOR SOLUTION INTRAVENOUS at 16:41

## 2019-09-07 ASSESSMENT — PAIN SCALES - GENERAL
PAINLEVEL_OUTOF10: 7
PAINLEVEL_OUTOF10: 7
PAINLEVEL_OUTOF10: 0
PAINLEVEL_OUTOF10: 4
PAINLEVEL_OUTOF10: 7
PAINLEVEL_OUTOF10: 6
PAINLEVEL_OUTOF10: 7
PAINLEVEL_OUTOF10: 7
PAINLEVEL_OUTOF10: 4
PAINLEVEL_OUTOF10: 5
PAINLEVEL_OUTOF10: 6
PAINLEVEL_OUTOF10: 6

## 2019-09-07 ASSESSMENT — PAIN DESCRIPTION - LOCATION
LOCATION: ABDOMEN

## 2019-09-07 ASSESSMENT — PAIN DESCRIPTION - DESCRIPTORS
DESCRIPTORS: DULL

## 2019-09-07 ASSESSMENT — PAIN DESCRIPTION - ORIENTATION
ORIENTATION: UPPER

## 2019-09-07 ASSESSMENT — PAIN DESCRIPTION - ONSET
ONSET: ON-GOING

## 2019-09-07 ASSESSMENT — PAIN DESCRIPTION - PAIN TYPE
TYPE: ACUTE PAIN

## 2019-09-07 ASSESSMENT — PAIN DESCRIPTION - PROGRESSION
CLINICAL_PROGRESSION: NOT CHANGED
CLINICAL_PROGRESSION: GRADUALLY WORSENING
CLINICAL_PROGRESSION: NOT CHANGED
CLINICAL_PROGRESSION: GRADUALLY WORSENING
CLINICAL_PROGRESSION: NOT CHANGED
CLINICAL_PROGRESSION: NOT CHANGED

## 2019-09-07 ASSESSMENT — PAIN DESCRIPTION - FREQUENCY
FREQUENCY: CONTINUOUS

## 2019-09-07 ASSESSMENT — LIFESTYLE VARIABLES: SMOKING_STATUS: 1

## 2019-09-07 NOTE — PLAN OF CARE
Problem: Fluid Volume:  Goal: Will maintain adequate fluid volume  Description  Will maintain adequate fluid volume  9/7/2019 0504 by Rubia Calle RN  Outcome: Met This Shift     Problem: Skin Integrity:  Goal: Skin integrity will be maintained  Description  Skin integrity will be maintained  9/7/2019 0504 by Rubia Calle RN  Outcome: Met This Shift
Problem: Pain:  Goal: Pain level will decrease  Description  Pain level will decrease  Outcome: Met This Shift  Goal: Control of acute pain  Description  Control of acute pain  Outcome: Met This Shift  Goal: Control of chronic pain  Description  Control of chronic pain  Outcome: Met This Shift
regular respiratory rate will improve  Outcome: Met This Shift     Problem: Sensory:  Goal: General experience of comfort will improve  Description  General experience of comfort will improve  Outcome: Met This Shift     Problem: Skin Integrity:  Goal: Skin integrity will be maintained  Description  Skin integrity will be maintained  Outcome: Met This Shift

## 2019-09-07 NOTE — ANESTHESIA PRE PROCEDURE
POC Tests: No results for input(s): POCGLU, POCNA, POCK, POCCL, POCBUN, POCHEMO, POCHCT in the last 72 hours. Coags: No results found for: PROTIME, INR, APTT    HCG (If Applicable): No results found for: PREGTESTUR, PREGSERUM, HCG, HCGQUANT     ABGs: No results found for: PHART, PO2ART, KRJ9BOY, RKB0ALT, BEART, N4UEKXTL     Type & Screen (If Applicable):  No results found for: LABABO, [address]    Anesthesia Evaluation  Patient summary reviewed and Nursing notes reviewed no history of anesthetic complications:   Airway: Mallampati: II  TM distance: >3 FB   Neck ROM: limited  Mouth opening: > = 3 FB Dental:          Pulmonary:   (+) decreased breath sounds,  current smoker (80 pack-year hx cigarette smoker)          Patient did not smoke on day of surgery. Cardiovascular:    (+) hypertension: severe, past MI: > 6 months, CABG/stent (coronary stent x 1):, hyperlipidemia    (-) peripheral edema    ECG reviewed  Rhythm: regular  Rate: normal           Beta Blocker:  Dose within 24 Hrs      ROS comment: Sinus rhythm with marked sinus arrhythmia  Otherwise normal ECG  No previous ECGs available  Confirmed by Gabby Castillo ([id number]) on 9/6/2019 6:53:58 AM     Neuro/Psych:   (+) depression/anxiety             GI/Hepatic/Renal:        (-) GERD      ROS comment: Denies c/o nausea. Endo/Other:    (+) blood dyscrasia (platelets: 346): thrombocytopenia and anticoagulation therapy:., electrolyte abnormalities, . Abdominal:       Abdomen: tender. Vascular: negative vascular ROS. Patient agrees to have nothing by mouth after midnight, tonight. Anesthesia Plan      general     ASA 3       Induction: intravenous and rapid sequence. BIS  MIPS: Prophylactic antiemetics administered. Anesthetic plan and risks discussed with patient and spouse. Plan discussed with CRNA.     Attending anesthesiologist reviewed and agrees with Pre Eval content            Everton Deloris Chávez, RN   9/7/2019      Pt seen, examined, chart reviewed, plan discussed.   Mauro Salinas  9/8/2019  7:07 AM

## 2019-09-08 ENCOUNTER — ANESTHESIA (OUTPATIENT)
Dept: OPERATING ROOM | Age: 64
DRG: 418 | End: 2019-09-08
Payer: COMMERCIAL

## 2019-09-08 ENCOUNTER — APPOINTMENT (OUTPATIENT)
Dept: GENERAL RADIOLOGY | Age: 64
DRG: 418 | End: 2019-09-08
Payer: COMMERCIAL

## 2019-09-08 VITALS
SYSTOLIC BLOOD PRESSURE: 160 MMHG | OXYGEN SATURATION: 99 % | TEMPERATURE: 95.5 F | RESPIRATION RATE: 14 BRPM | DIASTOLIC BLOOD PRESSURE: 91 MMHG

## 2019-09-08 LAB
ALBUMIN SERPL-MCNC: 3.7 G/DL (ref 3.5–5.2)
ALP BLD-CCNC: 154 U/L (ref 40–129)
ALT SERPL-CCNC: 156 U/L (ref 0–40)
ANION GAP SERPL CALCULATED.3IONS-SCNC: 12 MMOL/L (ref 7–16)
AST SERPL-CCNC: 48 U/L (ref 0–39)
BASOPHILS ABSOLUTE: 0.04 E9/L (ref 0–0.2)
BASOPHILS RELATIVE PERCENT: 0.5 % (ref 0–2)
BILIRUB SERPL-MCNC: 1.3 MG/DL (ref 0–1.2)
BUN BLDV-MCNC: 8 MG/DL (ref 8–23)
CALCIUM SERPL-MCNC: 8.8 MG/DL (ref 8.6–10.2)
CHLORIDE BLD-SCNC: 99 MMOL/L (ref 98–107)
CO2: 29 MMOL/L (ref 22–29)
CREAT SERPL-MCNC: 0.7 MG/DL (ref 0.7–1.2)
EOSINOPHILS ABSOLUTE: 0.06 E9/L (ref 0.05–0.5)
EOSINOPHILS RELATIVE PERCENT: 0.7 % (ref 0–6)
GFR AFRICAN AMERICAN: >60
GFR NON-AFRICAN AMERICAN: >60 ML/MIN/1.73
GLUCOSE BLD-MCNC: 101 MG/DL (ref 74–99)
HCT VFR BLD CALC: 44.1 % (ref 37–54)
HEMOGLOBIN: 14.4 G/DL (ref 12.5–16.5)
IMMATURE GRANULOCYTES #: 0.05 E9/L
IMMATURE GRANULOCYTES %: 0.6 % (ref 0–5)
LIPASE: 14 U/L (ref 13–60)
LYMPHOCYTES ABSOLUTE: 1.44 E9/L (ref 1.5–4)
LYMPHOCYTES RELATIVE PERCENT: 16.5 % (ref 20–42)
MCH RBC QN AUTO: 29.5 PG (ref 26–35)
MCHC RBC AUTO-ENTMCNC: 32.7 % (ref 32–34.5)
MCV RBC AUTO: 90.4 FL (ref 80–99.9)
MONOCYTES ABSOLUTE: 0.89 E9/L (ref 0.1–0.95)
MONOCYTES RELATIVE PERCENT: 10.2 % (ref 2–12)
NEUTROPHILS ABSOLUTE: 6.23 E9/L (ref 1.8–7.3)
NEUTROPHILS RELATIVE PERCENT: 71.5 % (ref 43–80)
PDW BLD-RTO: 12.4 FL (ref 11.5–15)
PLATELET # BLD: 122 E9/L (ref 130–450)
PMV BLD AUTO: 10.9 FL (ref 7–12)
POTASSIUM SERPL-SCNC: 3.2 MMOL/L (ref 3.5–5)
RBC # BLD: 4.88 E12/L (ref 3.8–5.8)
SODIUM BLD-SCNC: 140 MMOL/L (ref 132–146)
TOTAL PROTEIN: 6.4 G/DL (ref 6.4–8.3)
WBC # BLD: 8.7 E9/L (ref 4.5–11.5)

## 2019-09-08 PROCEDURE — 2580000003 HC RX 258

## 2019-09-08 PROCEDURE — 7100000001 HC PACU RECOVERY - ADDTL 15 MIN: Performed by: SURGERY

## 2019-09-08 PROCEDURE — 2580000003 HC RX 258: Performed by: INTERNAL MEDICINE

## 2019-09-08 PROCEDURE — 3600000004 HC SURGERY LEVEL 4 BASE: Performed by: SURGERY

## 2019-09-08 PROCEDURE — 6360000002 HC RX W HCPCS: Performed by: ANESTHESIOLOGY

## 2019-09-08 PROCEDURE — 85025 COMPLETE CBC W/AUTO DIFF WBC: CPT

## 2019-09-08 PROCEDURE — 88304 TISSUE EXAM BY PATHOLOGIST: CPT

## 2019-09-08 PROCEDURE — 6370000000 HC RX 637 (ALT 250 FOR IP): Performed by: STUDENT IN AN ORGANIZED HEALTH CARE EDUCATION/TRAINING PROGRAM

## 2019-09-08 PROCEDURE — 2709999900 HC NON-CHARGEABLE SUPPLY: Performed by: SURGERY

## 2019-09-08 PROCEDURE — 83690 ASSAY OF LIPASE: CPT

## 2019-09-08 PROCEDURE — 0FT44ZZ RESECTION OF GALLBLADDER, PERCUTANEOUS ENDOSCOPIC APPROACH: ICD-10-PCS | Performed by: STUDENT IN AN ORGANIZED HEALTH CARE EDUCATION/TRAINING PROGRAM

## 2019-09-08 PROCEDURE — 3600000014 HC SURGERY LEVEL 4 ADDTL 15MIN: Performed by: SURGERY

## 2019-09-08 PROCEDURE — 2500000003 HC RX 250 WO HCPCS: Performed by: SURGERY

## 2019-09-08 PROCEDURE — 3209999900 FLUORO FOR SURGICAL PROCEDURES

## 2019-09-08 PROCEDURE — 6360000002 HC RX W HCPCS: Performed by: INTERNAL MEDICINE

## 2019-09-08 PROCEDURE — 80053 COMPREHEN METABOLIC PANEL: CPT

## 2019-09-08 PROCEDURE — 6360000002 HC RX W HCPCS: Performed by: STUDENT IN AN ORGANIZED HEALTH CARE EDUCATION/TRAINING PROGRAM

## 2019-09-08 PROCEDURE — 3700000001 HC ADD 15 MINUTES (ANESTHESIA): Performed by: SURGERY

## 2019-09-08 PROCEDURE — 2700000000 HC OXYGEN THERAPY PER DAY

## 2019-09-08 PROCEDURE — 2060000000 HC ICU INTERMEDIATE R&B

## 2019-09-08 PROCEDURE — 2580000003 HC RX 258: Performed by: STUDENT IN AN ORGANIZED HEALTH CARE EDUCATION/TRAINING PROGRAM

## 2019-09-08 PROCEDURE — 36415 COLL VENOUS BLD VENIPUNCTURE: CPT

## 2019-09-08 PROCEDURE — 6360000002 HC RX W HCPCS: Performed by: SURGERY

## 2019-09-08 PROCEDURE — BF101ZZ FLUOROSCOPY OF BILE DUCTS USING LOW OSMOLAR CONTRAST: ICD-10-PCS | Performed by: STUDENT IN AN ORGANIZED HEALTH CARE EDUCATION/TRAINING PROGRAM

## 2019-09-08 PROCEDURE — 2500000003 HC RX 250 WO HCPCS

## 2019-09-08 PROCEDURE — 7100000000 HC PACU RECOVERY - FIRST 15 MIN: Performed by: SURGERY

## 2019-09-08 PROCEDURE — 94640 AIRWAY INHALATION TREATMENT: CPT

## 2019-09-08 PROCEDURE — 3700000000 HC ANESTHESIA ATTENDED CARE: Performed by: SURGERY

## 2019-09-08 PROCEDURE — 6360000002 HC RX W HCPCS

## 2019-09-08 RX ORDER — PROPOFOL 10 MG/ML
INJECTION, EMULSION INTRAVENOUS PRN
Status: DISCONTINUED | OUTPATIENT
Start: 2019-09-08 | End: 2019-09-08 | Stop reason: SDUPTHER

## 2019-09-08 RX ORDER — NEOSTIGMINE METHYLSULFATE 1 MG/ML
INJECTION, SOLUTION INTRAVENOUS PRN
Status: DISCONTINUED | OUTPATIENT
Start: 2019-09-08 | End: 2019-09-08 | Stop reason: SDUPTHER

## 2019-09-08 RX ORDER — FENTANYL CITRATE 50 UG/ML
INJECTION, SOLUTION INTRAMUSCULAR; INTRAVENOUS PRN
Status: DISCONTINUED | OUTPATIENT
Start: 2019-09-08 | End: 2019-09-08 | Stop reason: SDUPTHER

## 2019-09-08 RX ORDER — ROCURONIUM BROMIDE 10 MG/ML
INJECTION, SOLUTION INTRAVENOUS PRN
Status: DISCONTINUED | OUTPATIENT
Start: 2019-09-08 | End: 2019-09-08 | Stop reason: SDUPTHER

## 2019-09-08 RX ORDER — DEXAMETHASONE SODIUM PHOSPHATE 10 MG/ML
INJECTION INTRAMUSCULAR; INTRAVENOUS PRN
Status: DISCONTINUED | OUTPATIENT
Start: 2019-09-08 | End: 2019-09-08 | Stop reason: SDUPTHER

## 2019-09-08 RX ORDER — ONDANSETRON 2 MG/ML
INJECTION INTRAMUSCULAR; INTRAVENOUS PRN
Status: DISCONTINUED | OUTPATIENT
Start: 2019-09-08 | End: 2019-09-08 | Stop reason: SDUPTHER

## 2019-09-08 RX ORDER — GLYCOPYRROLATE 1 MG/5 ML
SYRINGE (ML) INTRAVENOUS PRN
Status: DISCONTINUED | OUTPATIENT
Start: 2019-09-08 | End: 2019-09-08 | Stop reason: SDUPTHER

## 2019-09-08 RX ORDER — HYDROCODONE BITARTRATE AND ACETAMINOPHEN 5; 325 MG/1; MG/1
2 TABLET ORAL PRN
Status: DISCONTINUED | OUTPATIENT
Start: 2019-09-08 | End: 2019-09-08 | Stop reason: HOSPADM

## 2019-09-08 RX ORDER — LIDOCAINE HYDROCHLORIDE 20 MG/ML
INJECTION, SOLUTION INTRAVENOUS PRN
Status: DISCONTINUED | OUTPATIENT
Start: 2019-09-08 | End: 2019-09-08 | Stop reason: SDUPTHER

## 2019-09-08 RX ORDER — SODIUM CHLORIDE 9 MG/ML
INJECTION, SOLUTION INTRAVENOUS CONTINUOUS PRN
Status: DISCONTINUED | OUTPATIENT
Start: 2019-09-08 | End: 2019-09-08 | Stop reason: SDUPTHER

## 2019-09-08 RX ORDER — MIDAZOLAM HYDROCHLORIDE 1 MG/ML
INJECTION INTRAMUSCULAR; INTRAVENOUS PRN
Status: DISCONTINUED | OUTPATIENT
Start: 2019-09-08 | End: 2019-09-08 | Stop reason: SDUPTHER

## 2019-09-08 RX ORDER — MORPHINE SULFATE 2 MG/ML
1 INJECTION, SOLUTION INTRAMUSCULAR; INTRAVENOUS EVERY 5 MIN PRN
Status: DISCONTINUED | OUTPATIENT
Start: 2019-09-08 | End: 2019-09-08 | Stop reason: HOSPADM

## 2019-09-08 RX ORDER — POTASSIUM CHLORIDE 7.45 MG/ML
10 INJECTION INTRAVENOUS ONCE
Status: DISCONTINUED | OUTPATIENT
Start: 2019-09-08 | End: 2019-09-08 | Stop reason: SDUPTHER

## 2019-09-08 RX ORDER — LABETALOL HYDROCHLORIDE 5 MG/ML
INJECTION, SOLUTION INTRAVENOUS PRN
Status: DISCONTINUED | OUTPATIENT
Start: 2019-09-08 | End: 2019-09-08 | Stop reason: SDUPTHER

## 2019-09-08 RX ORDER — MORPHINE SULFATE 2 MG/ML
2 INJECTION, SOLUTION INTRAMUSCULAR; INTRAVENOUS EVERY 5 MIN PRN
Status: DISCONTINUED | OUTPATIENT
Start: 2019-09-08 | End: 2019-09-08 | Stop reason: HOSPADM

## 2019-09-08 RX ORDER — POTASSIUM CHLORIDE 7.45 MG/ML
10 INJECTION INTRAVENOUS
Status: COMPLETED | OUTPATIENT
Start: 2019-09-08 | End: 2019-09-08

## 2019-09-08 RX ORDER — PROMETHAZINE HYDROCHLORIDE 25 MG/ML
6.25 INJECTION, SOLUTION INTRAMUSCULAR; INTRAVENOUS EVERY 10 MIN PRN
Status: DISCONTINUED | OUTPATIENT
Start: 2019-09-08 | End: 2019-09-08 | Stop reason: HOSPADM

## 2019-09-08 RX ORDER — HYDROCODONE BITARTRATE AND ACETAMINOPHEN 5; 325 MG/1; MG/1
1 TABLET ORAL PRN
Status: DISCONTINUED | OUTPATIENT
Start: 2019-09-08 | End: 2019-09-08 | Stop reason: HOSPADM

## 2019-09-08 RX ORDER — MEPERIDINE HYDROCHLORIDE 50 MG/ML
12.5 INJECTION INTRAMUSCULAR; INTRAVENOUS; SUBCUTANEOUS EVERY 5 MIN PRN
Status: DISCONTINUED | OUTPATIENT
Start: 2019-09-08 | End: 2019-09-08 | Stop reason: HOSPADM

## 2019-09-08 RX ADMIN — FENTANYL CITRATE 50 MCG: 50 INJECTION, SOLUTION INTRAMUSCULAR; INTRAVENOUS at 07:40

## 2019-09-08 RX ADMIN — FENTANYL CITRATE 50 MCG: 50 INJECTION, SOLUTION INTRAMUSCULAR; INTRAVENOUS at 07:46

## 2019-09-08 RX ADMIN — LABETALOL HYDROCHLORIDE 5 MG: 5 INJECTION INTRAVENOUS at 08:15

## 2019-09-08 RX ADMIN — LORAZEPAM 0.5 MG: 2 INJECTION INTRAMUSCULAR; INTRAVENOUS at 21:52

## 2019-09-08 RX ADMIN — SODIUM CHLORIDE, POTASSIUM CHLORIDE, SODIUM LACTATE AND CALCIUM CHLORIDE: 600; 310; 30; 20 INJECTION, SOLUTION INTRAVENOUS at 10:37

## 2019-09-08 RX ADMIN — DEXAMETHASONE SODIUM PHOSPHATE 10 MG: 10 INJECTION INTRAMUSCULAR; INTRAVENOUS at 07:30

## 2019-09-08 RX ADMIN — LABETALOL HYDROCHLORIDE 5 MG: 5 INJECTION INTRAVENOUS at 07:46

## 2019-09-08 RX ADMIN — Medication 0.6 MG: at 08:20

## 2019-09-08 RX ADMIN — LIDOCAINE HYDROCHLORIDE 80 MG: 20 INJECTION, SOLUTION INTRAVENOUS at 07:30

## 2019-09-08 RX ADMIN — FENTANYL CITRATE 50 MCG: 50 INJECTION, SOLUTION INTRAMUSCULAR; INTRAVENOUS at 07:55

## 2019-09-08 RX ADMIN — PIPERACILLIN SODIUM AND TAZOBACTAM SODIUM 3.38 G: 3; .375 INJECTION, POWDER, LYOPHILIZED, FOR SOLUTION INTRAVENOUS at 16:13

## 2019-09-08 RX ADMIN — IPRATROPIUM BROMIDE AND ALBUTEROL SULFATE 1 AMPULE: .5; 3 SOLUTION RESPIRATORY (INHALATION) at 20:09

## 2019-09-08 RX ADMIN — FENTANYL CITRATE 50 MCG: 50 INJECTION, SOLUTION INTRAMUSCULAR; INTRAVENOUS at 08:09

## 2019-09-08 RX ADMIN — IPRATROPIUM BROMIDE AND ALBUTEROL SULFATE 1 AMPULE: .5; 3 SOLUTION RESPIRATORY (INHALATION) at 11:33

## 2019-09-08 RX ADMIN — HYDROMORPHONE HYDROCHLORIDE 1 MG: 1 INJECTION, SOLUTION INTRAMUSCULAR; INTRAVENOUS; SUBCUTANEOUS at 23:08

## 2019-09-08 RX ADMIN — LABETALOL HYDROCHLORIDE 5 MG: 5 INJECTION INTRAVENOUS at 08:05

## 2019-09-08 RX ADMIN — MIDAZOLAM HYDROCHLORIDE 2 MG: 1 INJECTION, SOLUTION INTRAMUSCULAR; INTRAVENOUS at 07:25

## 2019-09-08 RX ADMIN — POTASSIUM CHLORIDE 10 MEQ: 7.46 INJECTION, SOLUTION INTRAVENOUS at 12:26

## 2019-09-08 RX ADMIN — PIPERACILLIN SODIUM AND TAZOBACTAM SODIUM 3.38 G: 3; .375 INJECTION, POWDER, LYOPHILIZED, FOR SOLUTION INTRAVENOUS at 06:38

## 2019-09-08 RX ADMIN — PROPOFOL 180 MG: 10 INJECTION, EMULSION INTRAVENOUS at 07:30

## 2019-09-08 RX ADMIN — MOMETASONE FUROATE AND FORMOTEROL FUMARATE DIHYDRATE 2 PUFF: 100; 5 AEROSOL RESPIRATORY (INHALATION) at 20:00

## 2019-09-08 RX ADMIN — HYDROMORPHONE HYDROCHLORIDE 1 MG: 1 INJECTION, SOLUTION INTRAMUSCULAR; INTRAVENOUS; SUBCUTANEOUS at 16:22

## 2019-09-08 RX ADMIN — HYDROMORPHONE HYDROCHLORIDE 1 MG: 1 INJECTION, SOLUTION INTRAMUSCULAR; INTRAVENOUS; SUBCUTANEOUS at 03:04

## 2019-09-08 RX ADMIN — POTASSIUM CHLORIDE 10 MEQ: 7.46 INJECTION, SOLUTION INTRAVENOUS at 13:22

## 2019-09-08 RX ADMIN — MORPHINE SULFATE 2 MG: 2 INJECTION, SOLUTION INTRAMUSCULAR; INTRAVENOUS at 09:21

## 2019-09-08 RX ADMIN — Medication 3 MG: at 08:20

## 2019-09-08 RX ADMIN — ROCURONIUM BROMIDE 10 MG: 10 INJECTION, SOLUTION INTRAVENOUS at 07:40

## 2019-09-08 RX ADMIN — HYDROMORPHONE HYDROCHLORIDE 1 MG: 1 INJECTION, SOLUTION INTRAMUSCULAR; INTRAVENOUS; SUBCUTANEOUS at 20:00

## 2019-09-08 RX ADMIN — HYDROMORPHONE HYDROCHLORIDE 1 MG: 1 INJECTION, SOLUTION INTRAMUSCULAR; INTRAVENOUS; SUBCUTANEOUS at 06:38

## 2019-09-08 RX ADMIN — ROCURONIUM BROMIDE 20 MG: 10 INJECTION, SOLUTION INTRAVENOUS at 07:55

## 2019-09-08 RX ADMIN — SODIUM CHLORIDE: 9 INJECTION, SOLUTION INTRAVENOUS at 07:25

## 2019-09-08 RX ADMIN — FENTANYL CITRATE 50 MCG: 50 INJECTION, SOLUTION INTRAMUSCULAR; INTRAVENOUS at 07:30

## 2019-09-08 RX ADMIN — HYDROMORPHONE HYDROCHLORIDE 1 MG: 1 INJECTION, SOLUTION INTRAMUSCULAR; INTRAVENOUS; SUBCUTANEOUS at 13:22

## 2019-09-08 RX ADMIN — SUGAMMADEX 160 MG: 100 INJECTION, SOLUTION INTRAVENOUS at 08:38

## 2019-09-08 RX ADMIN — PIPERACILLIN SODIUM AND TAZOBACTAM SODIUM 3.38 G: 3; .375 INJECTION, POWDER, LYOPHILIZED, FOR SOLUTION INTRAVENOUS at 23:08

## 2019-09-08 RX ADMIN — HYDROMORPHONE HYDROCHLORIDE 1 MG: 1 INJECTION, SOLUTION INTRAMUSCULAR; INTRAVENOUS; SUBCUTANEOUS at 10:14

## 2019-09-08 RX ADMIN — ROCURONIUM BROMIDE 30 MG: 10 INJECTION, SOLUTION INTRAVENOUS at 07:30

## 2019-09-08 RX ADMIN — MORPHINE SULFATE 2 MG: 2 INJECTION, SOLUTION INTRAMUSCULAR; INTRAVENOUS at 09:34

## 2019-09-08 RX ADMIN — MORPHINE SULFATE 2 MG: 2 INJECTION, SOLUTION INTRAMUSCULAR; INTRAVENOUS at 09:27

## 2019-09-08 RX ADMIN — ONDANSETRON HYDROCHLORIDE 4 MG: 2 INJECTION, SOLUTION INTRAMUSCULAR; INTRAVENOUS at 08:20

## 2019-09-08 RX ADMIN — PIPERACILLIN SODIUM AND TAZOBACTAM SODIUM 3.38 G: 3; .375 INJECTION, POWDER, LYOPHILIZED, FOR SOLUTION INTRAVENOUS at 07:34

## 2019-09-08 RX ADMIN — IPRATROPIUM BROMIDE AND ALBUTEROL SULFATE 1 AMPULE: .5; 3 SOLUTION RESPIRATORY (INHALATION) at 16:03

## 2019-09-08 RX ADMIN — MOMETASONE FUROATE AND FORMOTEROL FUMARATE DIHYDRATE 2 PUFF: 100; 5 AEROSOL RESPIRATORY (INHALATION) at 10:14

## 2019-09-08 ASSESSMENT — PULMONARY FUNCTION TESTS
PIF_VALUE: 29
PIF_VALUE: 9
PIF_VALUE: 29
PIF_VALUE: 27
PIF_VALUE: 11
PIF_VALUE: 30
PIF_VALUE: 3
PIF_VALUE: 7
PIF_VALUE: 5
PIF_VALUE: 11
PIF_VALUE: 30
PIF_VALUE: 14
PIF_VALUE: 21
PIF_VALUE: 27
PIF_VALUE: 19
PIF_VALUE: 1
PIF_VALUE: 3
PIF_VALUE: 28
PIF_VALUE: 21
PIF_VALUE: 29
PIF_VALUE: 2
PIF_VALUE: 3
PIF_VALUE: 15
PIF_VALUE: 20
PIF_VALUE: 27
PIF_VALUE: 29
PIF_VALUE: 1
PIF_VALUE: 1
PIF_VALUE: 28
PIF_VALUE: 29
PIF_VALUE: 29
PIF_VALUE: 30
PIF_VALUE: 29
PIF_VALUE: 21
PIF_VALUE: 29
PIF_VALUE: 6
PIF_VALUE: 28
PIF_VALUE: 21
PIF_VALUE: 3
PIF_VALUE: 15
PIF_VALUE: 30
PIF_VALUE: 29
PIF_VALUE: 25
PIF_VALUE: 0
PIF_VALUE: 29
PIF_VALUE: 13
PIF_VALUE: 23
PIF_VALUE: 11
PIF_VALUE: 29
PIF_VALUE: 0
PIF_VALUE: 30
PIF_VALUE: 29
PIF_VALUE: 25
PIF_VALUE: 1
PIF_VALUE: 20
PIF_VALUE: 0
PIF_VALUE: 10
PIF_VALUE: 29
PIF_VALUE: 30
PIF_VALUE: 30
PIF_VALUE: 21
PIF_VALUE: 30
PIF_VALUE: 21
PIF_VALUE: 1
PIF_VALUE: 21
PIF_VALUE: 27
PIF_VALUE: 29
PIF_VALUE: 21
PIF_VALUE: 21
PIF_VALUE: 24
PIF_VALUE: 29
PIF_VALUE: 22
PIF_VALUE: 20
PIF_VALUE: 20

## 2019-09-08 ASSESSMENT — PAIN DESCRIPTION - ORIENTATION
ORIENTATION: MID;UPPER
ORIENTATION: MID;UPPER
ORIENTATION: UPPER
ORIENTATION: MID;UPPER
ORIENTATION: MID;UPPER

## 2019-09-08 ASSESSMENT — PAIN DESCRIPTION - DESCRIPTORS
DESCRIPTORS: ACHING;RADIATING;SORE
DESCRIPTORS: ACHING;SORE
DESCRIPTORS: ACHING;SORE
DESCRIPTORS: DULL
DESCRIPTORS: ACHING;SORE;RADIATING

## 2019-09-08 ASSESSMENT — PAIN - FUNCTIONAL ASSESSMENT
PAIN_FUNCTIONAL_ASSESSMENT: PREVENTS OR INTERFERES SOME ACTIVE ACTIVITIES AND ADLS
PAIN_FUNCTIONAL_ASSESSMENT: PREVENTS OR INTERFERES SOME ACTIVE ACTIVITIES AND ADLS

## 2019-09-08 ASSESSMENT — PAIN SCALES - GENERAL
PAINLEVEL_OUTOF10: 0
PAINLEVEL_OUTOF10: 7
PAINLEVEL_OUTOF10: 6
PAINLEVEL_OUTOF10: 10
PAINLEVEL_OUTOF10: 8
PAINLEVEL_OUTOF10: 7
PAINLEVEL_OUTOF10: 9
PAINLEVEL_OUTOF10: 5
PAINLEVEL_OUTOF10: 9
PAINLEVEL_OUTOF10: 6
PAINLEVEL_OUTOF10: 10
PAINLEVEL_OUTOF10: 8
PAINLEVEL_OUTOF10: 5

## 2019-09-08 ASSESSMENT — PAIN DESCRIPTION - PAIN TYPE
TYPE: SURGICAL PAIN
TYPE: ACUTE PAIN;SURGICAL PAIN
TYPE: SURGICAL PAIN
TYPE: ACUTE PAIN
TYPE: ACUTE PAIN;SURGICAL PAIN

## 2019-09-08 ASSESSMENT — PAIN DESCRIPTION - FREQUENCY
FREQUENCY: CONTINUOUS

## 2019-09-08 ASSESSMENT — PAIN DESCRIPTION - LOCATION
LOCATION: ABDOMEN

## 2019-09-08 ASSESSMENT — PAIN DESCRIPTION - ONSET
ONSET: ON-GOING

## 2019-09-08 ASSESSMENT — PAIN DESCRIPTION - PROGRESSION
CLINICAL_PROGRESSION: GRADUALLY WORSENING
CLINICAL_PROGRESSION: GRADUALLY IMPROVING
CLINICAL_PROGRESSION: GRADUALLY IMPROVING

## 2019-09-09 VITALS
OXYGEN SATURATION: 92 % | DIASTOLIC BLOOD PRESSURE: 71 MMHG | RESPIRATION RATE: 18 BRPM | HEIGHT: 71 IN | SYSTOLIC BLOOD PRESSURE: 153 MMHG | WEIGHT: 185.3 LBS | HEART RATE: 85 BPM | TEMPERATURE: 97.3 F | BODY MASS INDEX: 25.94 KG/M2

## 2019-09-09 PROBLEM — K85.90 PANCREATITIS, UNSPECIFIED PANCREATITIS TYPE: Status: RESOLVED | Noted: 2019-09-04 | Resolved: 2019-09-09

## 2019-09-09 LAB
ALBUMIN SERPL-MCNC: 3.5 G/DL (ref 3.5–5.2)
ALP BLD-CCNC: 159 U/L (ref 40–129)
ALT SERPL-CCNC: 139 U/L (ref 0–40)
ANION GAP SERPL CALCULATED.3IONS-SCNC: 14 MMOL/L (ref 7–16)
AST SERPL-CCNC: 50 U/L (ref 0–39)
BASOPHILS ABSOLUTE: 0.01 E9/L (ref 0–0.2)
BASOPHILS RELATIVE PERCENT: 0.1 % (ref 0–2)
BILIRUB SERPL-MCNC: 0.9 MG/DL (ref 0–1.2)
BLOOD CULTURE, ROUTINE: NORMAL
BUN BLDV-MCNC: 9 MG/DL (ref 8–23)
CALCIUM SERPL-MCNC: 8.9 MG/DL (ref 8.6–10.2)
CHLORIDE BLD-SCNC: 99 MMOL/L (ref 98–107)
CO2: 29 MMOL/L (ref 22–29)
CREAT SERPL-MCNC: 0.7 MG/DL (ref 0.7–1.2)
CULTURE, BLOOD 2: NORMAL
EOSINOPHILS ABSOLUTE: 0 E9/L (ref 0.05–0.5)
EOSINOPHILS RELATIVE PERCENT: 0 % (ref 0–6)
GFR AFRICAN AMERICAN: >60
GFR NON-AFRICAN AMERICAN: >60 ML/MIN/1.73
GLUCOSE BLD-MCNC: 117 MG/DL (ref 74–99)
HCT VFR BLD CALC: 40.2 % (ref 37–54)
HEMOGLOBIN: 13.2 G/DL (ref 12.5–16.5)
IMMATURE GRANULOCYTES #: 0.04 E9/L
IMMATURE GRANULOCYTES %: 0.5 % (ref 0–5)
LIPASE: 10 U/L (ref 13–60)
LYMPHOCYTES ABSOLUTE: 1.02 E9/L (ref 1.5–4)
LYMPHOCYTES RELATIVE PERCENT: 12.5 % (ref 20–42)
MCH RBC QN AUTO: 30.1 PG (ref 26–35)
MCHC RBC AUTO-ENTMCNC: 32.8 % (ref 32–34.5)
MCV RBC AUTO: 91.6 FL (ref 80–99.9)
MONOCYTES ABSOLUTE: 0.83 E9/L (ref 0.1–0.95)
MONOCYTES RELATIVE PERCENT: 10.2 % (ref 2–12)
NEUTROPHILS ABSOLUTE: 6.26 E9/L (ref 1.8–7.3)
NEUTROPHILS RELATIVE PERCENT: 76.7 % (ref 43–80)
PDW BLD-RTO: 12.4 FL (ref 11.5–15)
PLATELET # BLD: 133 E9/L (ref 130–450)
PMV BLD AUTO: 10.6 FL (ref 7–12)
POTASSIUM SERPL-SCNC: 3.8 MMOL/L (ref 3.5–5)
RBC # BLD: 4.39 E12/L (ref 3.8–5.8)
SODIUM BLD-SCNC: 142 MMOL/L (ref 132–146)
TOTAL PROTEIN: 5.8 G/DL (ref 6.4–8.3)
WBC # BLD: 8.2 E9/L (ref 4.5–11.5)

## 2019-09-09 PROCEDURE — 6360000002 HC RX W HCPCS: Performed by: STUDENT IN AN ORGANIZED HEALTH CARE EDUCATION/TRAINING PROGRAM

## 2019-09-09 PROCEDURE — 85025 COMPLETE CBC W/AUTO DIFF WBC: CPT

## 2019-09-09 PROCEDURE — 2580000003 HC RX 258: Performed by: INTERNAL MEDICINE

## 2019-09-09 PROCEDURE — 36415 COLL VENOUS BLD VENIPUNCTURE: CPT

## 2019-09-09 PROCEDURE — 6370000000 HC RX 637 (ALT 250 FOR IP): Performed by: STUDENT IN AN ORGANIZED HEALTH CARE EDUCATION/TRAINING PROGRAM

## 2019-09-09 PROCEDURE — 94640 AIRWAY INHALATION TREATMENT: CPT

## 2019-09-09 PROCEDURE — 6360000002 HC RX W HCPCS: Performed by: INTERNAL MEDICINE

## 2019-09-09 PROCEDURE — 2700000000 HC OXYGEN THERAPY PER DAY

## 2019-09-09 PROCEDURE — 80053 COMPREHEN METABOLIC PANEL: CPT

## 2019-09-09 PROCEDURE — 83690 ASSAY OF LIPASE: CPT

## 2019-09-09 RX ORDER — NICOTINE 21 MG/24HR
1 PATCH, TRANSDERMAL 24 HOURS TRANSDERMAL DAILY
Qty: 30 PATCH | Refills: 3 | Status: SHIPPED | OUTPATIENT
Start: 2019-09-09 | End: 2021-03-01

## 2019-09-09 RX ADMIN — IPRATROPIUM BROMIDE AND ALBUTEROL SULFATE 1 AMPULE: .5; 3 SOLUTION RESPIRATORY (INHALATION) at 09:16

## 2019-09-09 RX ADMIN — HYDROMORPHONE HYDROCHLORIDE 1 MG: 1 INJECTION, SOLUTION INTRAMUSCULAR; INTRAVENOUS; SUBCUTANEOUS at 08:47

## 2019-09-09 RX ADMIN — HYDROMORPHONE HYDROCHLORIDE 1 MG: 1 INJECTION, SOLUTION INTRAMUSCULAR; INTRAVENOUS; SUBCUTANEOUS at 02:08

## 2019-09-09 RX ADMIN — HYDROMORPHONE HYDROCHLORIDE 1 MG: 1 INJECTION, SOLUTION INTRAMUSCULAR; INTRAVENOUS; SUBCUTANEOUS at 05:35

## 2019-09-09 RX ADMIN — MOMETASONE FUROATE AND FORMOTEROL FUMARATE DIHYDRATE 2 PUFF: 100; 5 AEROSOL RESPIRATORY (INHALATION) at 08:47

## 2019-09-09 RX ADMIN — PIPERACILLIN SODIUM AND TAZOBACTAM SODIUM 3.38 G: 3; .375 INJECTION, POWDER, LYOPHILIZED, FOR SOLUTION INTRAVENOUS at 06:39

## 2019-09-09 ASSESSMENT — PAIN DESCRIPTION - FREQUENCY
FREQUENCY: CONTINUOUS
FREQUENCY: CONTINUOUS

## 2019-09-09 ASSESSMENT — PAIN DESCRIPTION - DESCRIPTORS
DESCRIPTORS: ACHING;RADIATING;SORE
DESCRIPTORS: ACHING;CRAMPING;CRUSHING

## 2019-09-09 ASSESSMENT — PAIN DESCRIPTION - LOCATION
LOCATION: ABDOMEN
LOCATION: ABDOMEN

## 2019-09-09 ASSESSMENT — PAIN - FUNCTIONAL ASSESSMENT: PAIN_FUNCTIONAL_ASSESSMENT: PREVENTS OR INTERFERES SOME ACTIVE ACTIVITIES AND ADLS

## 2019-09-09 ASSESSMENT — PAIN DESCRIPTION - ORIENTATION
ORIENTATION: MID;LOWER
ORIENTATION: MID;UPPER

## 2019-09-09 ASSESSMENT — PAIN DESCRIPTION - ONSET
ONSET: AWAKENED FROM SLEEP
ONSET: ON-GOING

## 2019-09-09 ASSESSMENT — PAIN DESCRIPTION - PAIN TYPE
TYPE: ACUTE PAIN;SURGICAL PAIN
TYPE: ACUTE PAIN;SURGICAL PAIN

## 2019-09-09 ASSESSMENT — PAIN SCALES - GENERAL
PAINLEVEL_OUTOF10: 3
PAINLEVEL_OUTOF10: 6
PAINLEVEL_OUTOF10: 10

## 2019-09-09 ASSESSMENT — PAIN DESCRIPTION - PROGRESSION
CLINICAL_PROGRESSION: RAPIDLY WORSENING
CLINICAL_PROGRESSION: GRADUALLY IMPROVING
CLINICAL_PROGRESSION: GRADUALLY WORSENING

## 2019-09-09 NOTE — PROGRESS NOTES
Dr. Bryant Villafuerte called unit. This RN asked Dr. Bryant Villafuerte for xanax per patient request and to discuss labs. Before being able to Inform Dr. Bryant Villafuerte of potasium of 5.5 he ended the phone call and hung up.
GENERAL SURGERY  DAILY PROGRESS NOTE  9/8/2019    Subjective:  No issues overnight. tolerated diet. Pain improved.      Objective:  BP (!) 154/89   Pulse 84   Temp 97.7 °F (36.5 °C) (Temporal)   Resp 18   Ht 5' 11\" (1.803 m)   Wt 185 lb 4.8 oz (84.1 kg)   SpO2 99%   BMI 25.84 kg/m²     GENERAL:  Laying in bed, awake, alert, cooperative, no apparent distress  HEAD: Normocephalic, atraumatic  EYES: No sclera icterus, pupils equal  LUNGS:  No increased work of breathing  CARDIOVASCULAR:  Reg rate  ABDOMEN:  Soft, non-tender, non-distended  EXTREMITIES: No edema or swelling  SKIN: Warm and dry    Assessment/Plan:  60 y.o. male with gallstone pancreatitis   - Lap adonay with IOC today  - trend LFTs, lipase  - Abx  - npo for procedure     Electronically signed by Merrill Patel MD on 9/8/2019 at 9:05 AM
Notified Dr. Eloise Weeks of procalcitonin level.
(NICODERM CQ) 14 MG/24HR 1 patch, 1 patch, Transdermal, Daily, Aliene Saint, MD, 1 patch at 09/05/19 2157    albuterol (PROVENTIL) nebulizer solution 2.5 mg, 2.5 mg, Nebulization, Q4H PRN, Brady Hua DO, 2.5 mg at 09/06/19 1100    ondansetron (ZOFRAN) injection 4 mg, 4 mg, Intravenous, Q6H PRN, Aliene Saint, MD, 4 mg at 09/05/19 0308    hydrALAZINE (APRESOLINE) injection 10 mg, 10 mg, Intravenous, Q6H PRN, Aliene Saint, MD, 10 mg at 09/05/19 2345    labetalol (NORMODYNE;TRANDATE) injection 10 mg, 10 mg, Intravenous, Q4H PRN, Aliene Saint, MD    LORazepam (ATIVAN) injection 0.5 mg, 0.5 mg, Intravenous, Q6H PRN, Aliene Saint, MD, 0.5 mg at 09/06/19 0836    albuterol sulfate  (90 Base) MCG/ACT inhaler 2 puff, 2 puff, Inhalation, Q6H PRN, Aliene Saint, MD    mometasone-formoterol (DULERA) 100-5 MCG/ACT inhaler 2 puff, 2 puff, Inhalation, BID, Aliene Saint, MD, 2 puff at 09/06/19 0839    piperacillin-tazobactam (ZOSYN) 3.375 g in dextrose 5 % 100 mL IVPB extended infusion (mini-bag), 3.375 g, Intravenous, Q8H, Aliene Saint, MD, Last Rate: 25 mL/hr at 09/06/19 0643, 3.375 g at 09/06/19 7097    Diet NPO Effective Now Exceptions are: Ice Chips    US ABDOMEN LIMITED   Final Result   Cholelithiasis. CT ABDOMEN PELVIS W IV CONTRAST Additional Contrast? None   Final Result   Inflammatory changes with edema , wall thickening and enhancement of   the pylorus, duodenum with inflammation extending to the pancreatic   head and uncinate processes concerning for   gastroduodenitis/pancreatitis. There may also be cholecystitis with a   distended inflamed gallbladder. Multiple infiltrative hypodense lesions in the right hepatic lobe for   which benign and malignant etiologies are considered. Further   assessment and surveillance by MRI or PET/CT scan is recommended.       CTA CHEST W CONTRAST   Final Result   No evidence of pulmonary
problem  RESPIRATORY: denies cough, shortness of breath, sputum expectoration, chest pain. CARDIOVASCULAR:  Denies palpitation  GASTROINTESTINAL:  Abdomen pain   GENITOURINARY:  Denies burning urination or frequency of urination  INTEGUMENT: denies wound , rash  HEMATOLOGIC/LYMPHATIC:  Denies lymph node swelling, gum bleeding or easy bruising. MUSCULOSKELETAL:  Denies leg pain , joint pain , joint swelling  NEUROLOGICAL:  Denies light headed, dizziness, loss of consciousness, weakness of lower extremities, bowel or bladder incontinence. PHYSICAL EXAM:      Vitals:     Vitals:    09/08/19 1014   BP: (!) 162/88   Pulse: 75   Resp: 20   Temp: 98.6 °F (37 °C)   SpO2: 96%       General Appearance:    Awake, alert , no acute distress. Head:    Normocephalic, atraumatic   Eyes:    No pallor, no icterus,   Ears:    No obvious deformity or drainage.    Nose:   No nasal drainage   Throat:   Mucosa moist, no oral thrush   Neck:   Supple, no lymphadenopathy   Back:     no CVA tenderness   Lungs:     Clear to auscultation bilaterally, no wheeze    Heart:    Regular rate and rhythm, no murmur, rub or gallop   Abdomen:     Soft, tender epigastric region, no rebound , bowel sounds present , no Liriano's sign    Extremities:   No edema, no tenderness    Pulses:   Dorsalis pedis palpable    Skin:   no rashes or lesions     CBC with Differential:      Lab Results   Component Value Date    WBC 8.7 09/08/2019    RBC 4.88 09/08/2019    HGB 14.4 09/08/2019    HCT 44.1 09/08/2019     09/08/2019    MCV 90.4 09/08/2019    MCH 29.5 09/08/2019    MCHC 32.7 09/08/2019    RDW 12.4 09/08/2019    SEGSPCT 50 11/17/2010    LYMPHOPCT 16.5 09/08/2019    MONOPCT 10.2 09/08/2019    BASOPCT 0.5 09/08/2019    MONOSABS 0.89 09/08/2019    LYMPHSABS 1.44 09/08/2019    EOSABS 0.06 09/08/2019    BASOSABS 0.04 09/08/2019       CMP     Lab Results   Component Value Date     09/08/2019    K 3.2 09/08/2019    K 5.5 09/04/2019    CL 99
concerning for   gastroduodenitis/pancreatitis. There may also be cholecystitis with a   distended inflamed gallbladder. Multiple infiltrative hypodense lesions in the right hepatic lobe for   which benign and malignant etiologies are considered. Further   assessment and surveillance by MRI or PET/CT scan is recommended. CTA CHEST W CONTRAST   Final Result   No evidence of pulmonary embolism, organized pneumonia, pleural   effusion, or cardiac decompensation. Noncalcified and calcified granulomatous scarring is noted in the   lungs, with both lungs having nodules measuring up to 7 mm diameter. [place] E[place] criteria would recommend reevaluation in 6-12 months   to document stability. Mild aortic aneurysmal dilation without acute complications. Maximum   diameter measurements are currently in the 3.5 cm range. XR CHEST STANDARD (2 VW)   Final Result   NO ACUTE CARDIOPULMONARY PROCESS      Chronic obstructive pulmonary disease              Assessment:    Active Problems:    Pancreatitis, unspecified pancreatitis type  Resolved Problems:    * No resolved hospital problems. *  s/p lap adonay  Elevated lfts- cbd stone    Plan:    Dc home   ?ercp -surgery following out patient    Cristian Dorsey  12:56 PM  9/9/2019    NOTE: This report was transcribed using voice recognition software.  Every effort was made to ensure accuracy; however, inadvertent transcription errors may be present

## 2019-10-04 ENCOUNTER — PREP FOR PROCEDURE (OUTPATIENT)
Dept: GASTROENTEROLOGY | Age: 64
End: 2019-10-04

## 2019-10-04 DIAGNOSIS — R10.84 GENERALIZED ABDOMINAL PAIN: Primary | ICD-10-CM

## 2019-10-04 RX ORDER — INDOMETHACIN 50 MG/1
50 SUPPOSITORY RECTAL
Status: CANCELLED | OUTPATIENT
Start: 2019-10-05

## 2019-10-04 RX ORDER — SODIUM CHLORIDE 9 MG/ML
INJECTION, SOLUTION INTRAVENOUS CONTINUOUS
Status: CANCELLED | OUTPATIENT
Start: 2019-10-04

## 2019-10-04 RX ORDER — SODIUM CHLORIDE 0.9 % (FLUSH) 0.9 %
10 SYRINGE (ML) INJECTION EVERY 12 HOURS SCHEDULED
Status: CANCELLED | OUTPATIENT
Start: 2019-10-04

## 2019-10-04 RX ORDER — SODIUM CHLORIDE, SODIUM LACTATE, POTASSIUM CHLORIDE, CALCIUM CHLORIDE 600; 310; 30; 20 MG/100ML; MG/100ML; MG/100ML; MG/100ML
1000 INJECTION, SOLUTION INTRAVENOUS CONTINUOUS
Status: CANCELLED | OUTPATIENT
Start: 2019-10-04

## 2019-10-07 ENCOUNTER — APPOINTMENT (OUTPATIENT)
Dept: GENERAL RADIOLOGY | Age: 64
End: 2019-10-07
Attending: INTERNAL MEDICINE
Payer: COMMERCIAL

## 2019-10-07 ENCOUNTER — ANESTHESIA (OUTPATIENT)
Dept: ENDOSCOPY | Age: 64
End: 2019-10-07
Payer: COMMERCIAL

## 2019-10-07 ENCOUNTER — HOSPITAL ENCOUNTER (OUTPATIENT)
Age: 64
Setting detail: OBSERVATION
Discharge: HOME | End: 2019-10-10
Attending: INTERNAL MEDICINE | Admitting: INTERNAL MEDICINE
Payer: COMMERCIAL

## 2019-10-07 ENCOUNTER — ANESTHESIA EVENT (OUTPATIENT)
Dept: ENDOSCOPY | Age: 64
End: 2019-10-07
Payer: COMMERCIAL

## 2019-10-07 VITALS — SYSTOLIC BLOOD PRESSURE: 172 MMHG | OXYGEN SATURATION: 91 % | DIASTOLIC BLOOD PRESSURE: 90 MMHG

## 2019-10-07 DIAGNOSIS — K80.50 CHOLEDOCHOLITHIASIS: Primary | ICD-10-CM

## 2019-10-07 LAB
INR BLD: 1
PROTHROMBIN TIME: 11.5 SEC (ref 9.3–12.4)

## 2019-10-07 PROCEDURE — 36415 COLL VENOUS BLD VENIPUNCTURE: CPT

## 2019-10-07 PROCEDURE — 6360000002 HC RX W HCPCS: Performed by: INTERNAL MEDICINE

## 2019-10-07 PROCEDURE — 2500000003 HC RX 250 WO HCPCS

## 2019-10-07 PROCEDURE — 96375 TX/PRO/DX INJ NEW DRUG ADDON: CPT

## 2019-10-07 PROCEDURE — 7100000001 HC PACU RECOVERY - ADDTL 15 MIN: Performed by: INTERNAL MEDICINE

## 2019-10-07 PROCEDURE — 74330 X-RAY BILE/PANC ENDOSCOPY: CPT

## 2019-10-07 PROCEDURE — C2625 STENT, NON-COR, TEM W/DEL SY: HCPCS | Performed by: INTERNAL MEDICINE

## 2019-10-07 PROCEDURE — 7100000000 HC PACU RECOVERY - FIRST 15 MIN: Performed by: INTERNAL MEDICINE

## 2019-10-07 PROCEDURE — 3609015100 HC ERCP STENT PLACEMENT BILIARY/PANCREATIC DUCT: Performed by: INTERNAL MEDICINE

## 2019-10-07 PROCEDURE — C1769 GUIDE WIRE: HCPCS | Performed by: INTERNAL MEDICINE

## 2019-10-07 PROCEDURE — G0378 HOSPITAL OBSERVATION PER HR: HCPCS

## 2019-10-07 PROCEDURE — 2580000003 HC RX 258: Performed by: NURSE ANESTHETIST, CERTIFIED REGISTERED

## 2019-10-07 PROCEDURE — 85610 PROTHROMBIN TIME: CPT

## 2019-10-07 PROCEDURE — 3700000000 HC ANESTHESIA ATTENDED CARE: Performed by: INTERNAL MEDICINE

## 2019-10-07 PROCEDURE — 3700000001 HC ADD 15 MINUTES (ANESTHESIA): Performed by: INTERNAL MEDICINE

## 2019-10-07 PROCEDURE — 6370000000 HC RX 637 (ALT 250 FOR IP): Performed by: INTERNAL MEDICINE

## 2019-10-07 PROCEDURE — 2580000003 HC RX 258: Performed by: INTERNAL MEDICINE

## 2019-10-07 PROCEDURE — 6360000002 HC RX W HCPCS: Performed by: NURSE ANESTHETIST, CERTIFIED REGISTERED

## 2019-10-07 PROCEDURE — 2500000003 HC RX 250 WO HCPCS: Performed by: NURSE ANESTHETIST, CERTIFIED REGISTERED

## 2019-10-07 PROCEDURE — 2709999900 HC NON-CHARGEABLE SUPPLY: Performed by: INTERNAL MEDICINE

## 2019-10-07 PROCEDURE — 6360000002 HC RX W HCPCS

## 2019-10-07 PROCEDURE — 96376 TX/PRO/DX INJ SAME DRUG ADON: CPT

## 2019-10-07 PROCEDURE — 96365 THER/PROPH/DIAG IV INF INIT: CPT

## 2019-10-07 DEVICE — PANCREATIC STENT KIT
Type: IMPLANTABLE DEVICE | Status: NON-FUNCTIONAL
Brand: ADVANIX™ PANCREATIC STENT KIT
Removed: 2019-10-09

## 2019-10-07 RX ORDER — SODIUM CHLORIDE 9 MG/ML
INJECTION, SOLUTION INTRAVENOUS CONTINUOUS
Status: DISCONTINUED | OUTPATIENT
Start: 2019-10-07 | End: 2019-10-08

## 2019-10-07 RX ORDER — ONDANSETRON 2 MG/ML
4 INJECTION INTRAMUSCULAR; INTRAVENOUS EVERY 6 HOURS PRN
Status: DISCONTINUED | OUTPATIENT
Start: 2019-10-07 | End: 2019-10-10 | Stop reason: HOSPADM

## 2019-10-07 RX ORDER — SODIUM CHLORIDE 0.9 % (FLUSH) 0.9 %
10 SYRINGE (ML) INJECTION EVERY 12 HOURS SCHEDULED
Status: DISCONTINUED | OUTPATIENT
Start: 2019-10-07 | End: 2019-10-10 | Stop reason: HOSPADM

## 2019-10-07 RX ORDER — NICOTINE 21 MG/24HR
1 PATCH, TRANSDERMAL 24 HOURS TRANSDERMAL DAILY
Status: DISCONTINUED | OUTPATIENT
Start: 2019-10-07 | End: 2019-10-10 | Stop reason: HOSPADM

## 2019-10-07 RX ORDER — ALPRAZOLAM 0.25 MG/1
0.5 TABLET ORAL 3 TIMES DAILY PRN
Status: DISCONTINUED | OUTPATIENT
Start: 2019-10-07 | End: 2019-10-10 | Stop reason: HOSPADM

## 2019-10-07 RX ORDER — LISINOPRIL 5 MG/1
2.5 TABLET ORAL DAILY
Status: DISCONTINUED | OUTPATIENT
Start: 2019-10-07 | End: 2019-10-10 | Stop reason: HOSPADM

## 2019-10-07 RX ORDER — SODIUM CHLORIDE, SODIUM LACTATE, POTASSIUM CHLORIDE, CALCIUM CHLORIDE 600; 310; 30; 20 MG/100ML; MG/100ML; MG/100ML; MG/100ML
INJECTION, SOLUTION INTRAVENOUS CONTINUOUS PRN
Status: DISCONTINUED | OUTPATIENT
Start: 2019-10-07 | End: 2019-10-07 | Stop reason: SDUPTHER

## 2019-10-07 RX ORDER — ASPIRIN 81 MG/1
81 TABLET ORAL DAILY
Status: DISCONTINUED | OUTPATIENT
Start: 2019-10-07 | End: 2019-10-10 | Stop reason: HOSPADM

## 2019-10-07 RX ORDER — NITROGLYCERIN 0.4 MG/1
0.4 TABLET SUBLINGUAL EVERY 5 MIN PRN
Status: DISCONTINUED | OUTPATIENT
Start: 2019-10-07 | End: 2019-10-10 | Stop reason: HOSPADM

## 2019-10-07 RX ORDER — LABETALOL HYDROCHLORIDE 5 MG/ML
INJECTION, SOLUTION INTRAVENOUS
Status: COMPLETED
Start: 2019-10-07 | End: 2019-10-07

## 2019-10-07 RX ORDER — MIDAZOLAM HYDROCHLORIDE 1 MG/ML
INJECTION INTRAMUSCULAR; INTRAVENOUS PRN
Status: DISCONTINUED | OUTPATIENT
Start: 2019-10-07 | End: 2019-10-07 | Stop reason: SDUPTHER

## 2019-10-07 RX ORDER — SERTRALINE HYDROCHLORIDE 100 MG/1
100 TABLET, FILM COATED ORAL DAILY
Status: DISCONTINUED | OUTPATIENT
Start: 2019-10-07 | End: 2019-10-10 | Stop reason: HOSPADM

## 2019-10-07 RX ORDER — PROPOFOL 10 MG/ML
INJECTION, EMULSION INTRAVENOUS PRN
Status: DISCONTINUED | OUTPATIENT
Start: 2019-10-07 | End: 2019-10-07 | Stop reason: SDUPTHER

## 2019-10-07 RX ORDER — METOPROLOL SUCCINATE 25 MG/1
25 TABLET, EXTENDED RELEASE ORAL DAILY
Status: DISCONTINUED | OUTPATIENT
Start: 2019-10-07 | End: 2019-10-10 | Stop reason: HOSPADM

## 2019-10-07 RX ORDER — HYDROCODONE BITARTRATE AND ACETAMINOPHEN 10; 325 MG/1; MG/1
1 TABLET ORAL EVERY 6 HOURS PRN
Status: DISCONTINUED | OUTPATIENT
Start: 2019-10-07 | End: 2019-10-10 | Stop reason: HOSPADM

## 2019-10-07 RX ORDER — FENTANYL CITRATE 50 UG/ML
INJECTION, SOLUTION INTRAMUSCULAR; INTRAVENOUS PRN
Status: DISCONTINUED | OUTPATIENT
Start: 2019-10-07 | End: 2019-10-07 | Stop reason: SDUPTHER

## 2019-10-07 RX ORDER — LABETALOL HYDROCHLORIDE 5 MG/ML
10 INJECTION, SOLUTION INTRAVENOUS ONCE
Status: COMPLETED | OUTPATIENT
Start: 2019-10-07 | End: 2019-10-07

## 2019-10-07 RX ORDER — SODIUM CHLORIDE, SODIUM LACTATE, POTASSIUM CHLORIDE, CALCIUM CHLORIDE 600; 310; 30; 20 MG/100ML; MG/100ML; MG/100ML; MG/100ML
1000 INJECTION, SOLUTION INTRAVENOUS CONTINUOUS
Status: DISCONTINUED | OUTPATIENT
Start: 2019-10-07 | End: 2019-10-10 | Stop reason: HOSPADM

## 2019-10-07 RX ORDER — ALBUTEROL SULFATE 90 UG/1
2 AEROSOL, METERED RESPIRATORY (INHALATION) EVERY 6 HOURS PRN
Status: DISCONTINUED | OUTPATIENT
Start: 2019-10-07 | End: 2019-10-10 | Stop reason: HOSPADM

## 2019-10-07 RX ORDER — 0.9 % SODIUM CHLORIDE 0.9 %
10 VIAL (ML) INJECTION PRN
Status: DISCONTINUED | OUTPATIENT
Start: 2019-10-07 | End: 2019-10-10 | Stop reason: HOSPADM

## 2019-10-07 RX ORDER — PROPOFOL 10 MG/ML
INJECTION, EMULSION INTRAVENOUS CONTINUOUS PRN
Status: DISCONTINUED | OUTPATIENT
Start: 2019-10-07 | End: 2019-10-07 | Stop reason: SDUPTHER

## 2019-10-07 RX ADMIN — PROPOFOL 80 MG: 10 INJECTION, EMULSION INTRAVENOUS at 13:15

## 2019-10-07 RX ADMIN — HYDROMORPHONE HYDROCHLORIDE 0.5 MG: 1 INJECTION, SOLUTION INTRAMUSCULAR; INTRAVENOUS; SUBCUTANEOUS at 18:31

## 2019-10-07 RX ADMIN — HYDROCODONE BITARTRATE AND ACETAMINOPHEN 1 TABLET: 10; 325 TABLET ORAL at 20:14

## 2019-10-07 RX ADMIN — ALPRAZOLAM 0.5 MG: 0.25 TABLET ORAL at 21:59

## 2019-10-07 RX ADMIN — PROPOFOL 75 MCG/KG/MIN: 10 INJECTION, EMULSION INTRAVENOUS at 13:21

## 2019-10-07 RX ADMIN — PROPOFOL 40 MG: 10 INJECTION, EMULSION INTRAVENOUS at 13:17

## 2019-10-07 RX ADMIN — HYDROMORPHONE HYDROCHLORIDE 0.5 MG: 1 INJECTION, SOLUTION INTRAMUSCULAR; INTRAVENOUS; SUBCUTANEOUS at 14:21

## 2019-10-07 RX ADMIN — LABETALOL HYDROCHLORIDE 10 MG: 5 INJECTION, SOLUTION INTRAVENOUS at 14:41

## 2019-10-07 RX ADMIN — Medication 10 ML: at 16:29

## 2019-10-07 RX ADMIN — SODIUM CHLORIDE, POTASSIUM CHLORIDE, SODIUM LACTATE AND CALCIUM CHLORIDE: 600; 310; 30; 20 INJECTION, SOLUTION INTRAVENOUS at 13:09

## 2019-10-07 RX ADMIN — GLUCAGON HYDROCHLORIDE 0.25 MG: KIT at 13:27

## 2019-10-07 RX ADMIN — ONDANSETRON 4 MG: 2 INJECTION INTRAMUSCULAR; INTRAVENOUS at 14:28

## 2019-10-07 RX ADMIN — SODIUM CHLORIDE: 9 INJECTION, SOLUTION INTRAVENOUS at 16:16

## 2019-10-07 RX ADMIN — FENTANYL CITRATE 100 MCG: 50 INJECTION, SOLUTION INTRAMUSCULAR; INTRAVENOUS at 13:15

## 2019-10-07 RX ADMIN — ONDANSETRON 4 MG: 2 INJECTION INTRAMUSCULAR; INTRAVENOUS at 20:54

## 2019-10-07 RX ADMIN — SODIUM CHLORIDE 3 G: 900 INJECTION INTRAVENOUS at 11:45

## 2019-10-07 RX ADMIN — PROPOFOL 30 MG: 10 INJECTION, EMULSION INTRAVENOUS at 13:20

## 2019-10-07 RX ADMIN — INDOMETHACIN 100 MG: 50 SUPPOSITORY RECTAL at 11:45

## 2019-10-07 RX ADMIN — MIDAZOLAM HYDROCHLORIDE 2 MG: 1 INJECTION, SOLUTION INTRAMUSCULAR; INTRAVENOUS at 13:15

## 2019-10-07 RX ADMIN — GLUCAGON HYDROCHLORIDE 0.25 MG: KIT at 13:34

## 2019-10-07 RX ADMIN — SODIUM CHLORIDE, POTASSIUM CHLORIDE, SODIUM LACTATE AND CALCIUM CHLORIDE 1000 ML: 600; 310; 30; 20 INJECTION, SOLUTION INTRAVENOUS at 11:44

## 2019-10-07 ASSESSMENT — PAIN DESCRIPTION - ONSET: ONSET: ON-GOING

## 2019-10-07 ASSESSMENT — PAIN DESCRIPTION - FREQUENCY
FREQUENCY: CONTINUOUS
FREQUENCY: CONTINUOUS

## 2019-10-07 ASSESSMENT — PAIN DESCRIPTION - DESCRIPTORS
DESCRIPTORS: ACHING
DESCRIPTORS: ACHING
DESCRIPTORS: ACHING;DISCOMFORT;SORE

## 2019-10-07 ASSESSMENT — PAIN SCALES - GENERAL
PAINLEVEL_OUTOF10: 7
PAINLEVEL_OUTOF10: 4
PAINLEVEL_OUTOF10: 7
PAINLEVEL_OUTOF10: 0
PAINLEVEL_OUTOF10: 5
PAINLEVEL_OUTOF10: 3
PAINLEVEL_OUTOF10: 2
PAINLEVEL_OUTOF10: 8
PAINLEVEL_OUTOF10: 5

## 2019-10-07 ASSESSMENT — PAIN DESCRIPTION - PAIN TYPE
TYPE: ACUTE PAIN

## 2019-10-07 ASSESSMENT — PAIN - FUNCTIONAL ASSESSMENT
PAIN_FUNCTIONAL_ASSESSMENT: ACTIVITIES ARE NOT PREVENTED
PAIN_FUNCTIONAL_ASSESSMENT: PREVENTS OR INTERFERES SOME ACTIVE ACTIVITIES AND ADLS
PAIN_FUNCTIONAL_ASSESSMENT: 0-10
PAIN_FUNCTIONAL_ASSESSMENT: ACTIVITIES ARE NOT PREVENTED

## 2019-10-07 ASSESSMENT — PAIN DESCRIPTION - LOCATION
LOCATION: ABDOMEN

## 2019-10-07 ASSESSMENT — PAIN DESCRIPTION - ORIENTATION
ORIENTATION: UPPER

## 2019-10-08 ENCOUNTER — ANESTHESIA EVENT (OUTPATIENT)
Dept: ENDOSCOPY | Age: 64
End: 2019-10-08
Payer: COMMERCIAL

## 2019-10-08 LAB
ALBUMIN SERPL-MCNC: 3.5 G/DL (ref 3.5–5.2)
ALP BLD-CCNC: 205 U/L (ref 40–129)
ALT SERPL-CCNC: 243 U/L (ref 0–40)
AMYLASE: 58 U/L (ref 20–100)
AST SERPL-CCNC: 205 U/L (ref 0–39)
BILIRUB SERPL-MCNC: 0.8 MG/DL (ref 0–1.2)
BILIRUBIN DIRECT: <0.2 MG/DL (ref 0–0.3)
BILIRUBIN, INDIRECT: ABNORMAL MG/DL (ref 0–1)
HCT VFR BLD CALC: 44.8 % (ref 37–54)
HEMOGLOBIN: 14.8 G/DL (ref 12.5–16.5)
LIPASE: 16 U/L (ref 13–60)
MCH RBC QN AUTO: 30.5 PG (ref 26–35)
MCHC RBC AUTO-ENTMCNC: 33 % (ref 32–34.5)
MCV RBC AUTO: 92.4 FL (ref 80–99.9)
PDW BLD-RTO: 12.6 FL (ref 11.5–15)
PLATELET # BLD: 120 E9/L (ref 130–450)
PMV BLD AUTO: 10 FL (ref 7–12)
RBC # BLD: 4.85 E12/L (ref 3.8–5.8)
TOTAL PROTEIN: 5.6 G/DL (ref 6.4–8.3)
WBC # BLD: 6.3 E9/L (ref 4.5–11.5)

## 2019-10-08 PROCEDURE — 83690 ASSAY OF LIPASE: CPT

## 2019-10-08 PROCEDURE — 6370000000 HC RX 637 (ALT 250 FOR IP): Performed by: INTERNAL MEDICINE

## 2019-10-08 PROCEDURE — 2580000003 HC RX 258: Performed by: INTERNAL MEDICINE

## 2019-10-08 PROCEDURE — 82150 ASSAY OF AMYLASE: CPT

## 2019-10-08 PROCEDURE — 80076 HEPATIC FUNCTION PANEL: CPT

## 2019-10-08 PROCEDURE — G0378 HOSPITAL OBSERVATION PER HR: HCPCS

## 2019-10-08 PROCEDURE — 36415 COLL VENOUS BLD VENIPUNCTURE: CPT

## 2019-10-08 PROCEDURE — 85027 COMPLETE CBC AUTOMATED: CPT

## 2019-10-08 PROCEDURE — 94664 DEMO&/EVAL PT USE INHALER: CPT

## 2019-10-08 RX ORDER — SODIUM CHLORIDE 9 MG/ML
INJECTION, SOLUTION INTRAVENOUS CONTINUOUS
Status: DISCONTINUED | OUTPATIENT
Start: 2019-10-08 | End: 2019-10-10 | Stop reason: HOSPADM

## 2019-10-08 RX ORDER — SODIUM CHLORIDE, SODIUM LACTATE, POTASSIUM CHLORIDE, AND CALCIUM CHLORIDE .6; .31; .03; .02 G/100ML; G/100ML; G/100ML; G/100ML
1000 INJECTION, SOLUTION INTRAVENOUS SEE ADMIN INSTRUCTIONS
Status: DISCONTINUED | OUTPATIENT
Start: 2019-10-08 | End: 2019-10-10 | Stop reason: HOSPADM

## 2019-10-08 RX ADMIN — HYDROCODONE BITARTRATE AND ACETAMINOPHEN 1 TABLET: 10; 325 TABLET ORAL at 04:13

## 2019-10-08 RX ADMIN — ALPRAZOLAM 0.5 MG: 0.25 TABLET ORAL at 07:54

## 2019-10-08 RX ADMIN — Medication 10 ML: at 19:02

## 2019-10-08 RX ADMIN — Medication 10 ML: at 07:54

## 2019-10-08 RX ADMIN — METOPROLOL SUCCINATE 25 MG: 25 TABLET, EXTENDED RELEASE ORAL at 07:53

## 2019-10-08 RX ADMIN — HYDROCODONE BITARTRATE AND ACETAMINOPHEN 1 TABLET: 10; 325 TABLET ORAL at 19:02

## 2019-10-08 RX ADMIN — SERTRALINE 100 MG: 100 TABLET, FILM COATED ORAL at 07:53

## 2019-10-08 RX ADMIN — ASPIRIN 81 MG: 81 TABLET, COATED ORAL at 07:53

## 2019-10-08 RX ADMIN — ALPRAZOLAM 0.5 MG: 0.25 TABLET ORAL at 17:34

## 2019-10-08 RX ADMIN — LISINOPRIL 2.5 MG: 5 TABLET ORAL at 07:54

## 2019-10-08 RX ADMIN — HYDROCODONE BITARTRATE AND ACETAMINOPHEN 1 TABLET: 10; 325 TABLET ORAL at 13:02

## 2019-10-08 RX ADMIN — MOMETASONE FUROATE AND FORMOTEROL FUMARATE DIHYDRATE 2 PUFF: 100; 5 AEROSOL RESPIRATORY (INHALATION) at 08:17

## 2019-10-08 RX ADMIN — MOMETASONE FUROATE AND FORMOTEROL FUMARATE DIHYDRATE 2 PUFF: 100; 5 AEROSOL RESPIRATORY (INHALATION) at 21:00

## 2019-10-08 ASSESSMENT — PAIN DESCRIPTION - PROGRESSION: CLINICAL_PROGRESSION: NOT CHANGED

## 2019-10-08 ASSESSMENT — PAIN DESCRIPTION - DESCRIPTORS
DESCRIPTORS: ACHING
DESCRIPTORS: ACHING;DISCOMFORT;SORE

## 2019-10-08 ASSESSMENT — PAIN DESCRIPTION - ORIENTATION: ORIENTATION: OTHER (COMMENT)

## 2019-10-08 ASSESSMENT — PAIN DESCRIPTION - FREQUENCY: FREQUENCY: CONTINUOUS

## 2019-10-08 ASSESSMENT — PAIN SCALES - GENERAL
PAINLEVEL_OUTOF10: 0
PAINLEVEL_OUTOF10: 5
PAINLEVEL_OUTOF10: 8
PAINLEVEL_OUTOF10: 8
PAINLEVEL_OUTOF10: 6

## 2019-10-08 ASSESSMENT — PAIN DESCRIPTION - ONSET: ONSET: ON-GOING

## 2019-10-08 ASSESSMENT — PAIN DESCRIPTION - PAIN TYPE
TYPE: ACUTE PAIN
TYPE: ACUTE PAIN

## 2019-10-08 ASSESSMENT — PAIN - FUNCTIONAL ASSESSMENT
PAIN_FUNCTIONAL_ASSESSMENT: ACTIVITIES ARE NOT PREVENTED
PAIN_FUNCTIONAL_ASSESSMENT: PREVENTS OR INTERFERES SOME ACTIVE ACTIVITIES AND ADLS

## 2019-10-08 ASSESSMENT — PAIN DESCRIPTION - LOCATION
LOCATION: ABDOMEN
LOCATION: GENERALIZED

## 2019-10-09 ENCOUNTER — ANESTHESIA (OUTPATIENT)
Dept: ENDOSCOPY | Age: 64
End: 2019-10-09
Payer: COMMERCIAL

## 2019-10-09 ENCOUNTER — APPOINTMENT (OUTPATIENT)
Dept: GENERAL RADIOLOGY | Age: 64
End: 2019-10-09
Attending: INTERNAL MEDICINE
Payer: COMMERCIAL

## 2019-10-09 VITALS — OXYGEN SATURATION: 96 % | DIASTOLIC BLOOD PRESSURE: 105 MMHG | SYSTOLIC BLOOD PRESSURE: 200 MMHG

## 2019-10-09 LAB
ALBUMIN SERPL-MCNC: 3.6 G/DL (ref 3.5–5.2)
ALP BLD-CCNC: 195 U/L (ref 40–129)
ALT SERPL-CCNC: 139 U/L (ref 0–40)
AMYLASE: 46 U/L (ref 20–100)
ANION GAP SERPL CALCULATED.3IONS-SCNC: 9 MMOL/L (ref 7–16)
APTT: 29.4 SEC (ref 24.5–35.1)
AST SERPL-CCNC: 68 U/L (ref 0–39)
BILIRUB SERPL-MCNC: 0.8 MG/DL (ref 0–1.2)
BUN BLDV-MCNC: 8 MG/DL (ref 8–23)
CALCIUM SERPL-MCNC: 9.2 MG/DL (ref 8.6–10.2)
CHLORIDE BLD-SCNC: 101 MMOL/L (ref 98–107)
CO2: 28 MMOL/L (ref 22–29)
CREAT SERPL-MCNC: 0.6 MG/DL (ref 0.7–1.2)
GFR AFRICAN AMERICAN: >60
GFR NON-AFRICAN AMERICAN: >60 ML/MIN/1.73
GLUCOSE BLD-MCNC: 102 MG/DL (ref 74–99)
HCT VFR BLD CALC: 44.2 % (ref 37–54)
HEMOGLOBIN: 14.7 G/DL (ref 12.5–16.5)
INR BLD: 1
LIPASE: 19 U/L (ref 13–60)
MCH RBC QN AUTO: 30.2 PG (ref 26–35)
MCHC RBC AUTO-ENTMCNC: 33.3 % (ref 32–34.5)
MCV RBC AUTO: 90.8 FL (ref 80–99.9)
PDW BLD-RTO: 12.4 FL (ref 11.5–15)
PLATELET # BLD: 126 E9/L (ref 130–450)
PMV BLD AUTO: 10.2 FL (ref 7–12)
POTASSIUM SERPL-SCNC: 3.9 MMOL/L (ref 3.5–5)
PROTHROMBIN TIME: 12.4 SEC (ref 9.3–12.4)
RBC # BLD: 4.87 E12/L (ref 3.8–5.8)
REASON FOR REJECTION: NORMAL
REJECTED TEST: NORMAL
SODIUM BLD-SCNC: 138 MMOL/L (ref 132–146)
TOTAL PROTEIN: 5.8 G/DL (ref 6.4–8.3)
WBC # BLD: 5.9 E9/L (ref 4.5–11.5)

## 2019-10-09 PROCEDURE — 96376 TX/PRO/DX INJ SAME DRUG ADON: CPT

## 2019-10-09 PROCEDURE — 3700000001 HC ADD 15 MINUTES (ANESTHESIA): Performed by: INTERNAL MEDICINE

## 2019-10-09 PROCEDURE — 74330 X-RAY BILE/PANC ENDOSCOPY: CPT

## 2019-10-09 PROCEDURE — 36415 COLL VENOUS BLD VENIPUNCTURE: CPT

## 2019-10-09 PROCEDURE — 6370000000 HC RX 637 (ALT 250 FOR IP): Performed by: NURSE PRACTITIONER

## 2019-10-09 PROCEDURE — 85730 THROMBOPLASTIN TIME PARTIAL: CPT

## 2019-10-09 PROCEDURE — 2580000003 HC RX 258: Performed by: INTERNAL MEDICINE

## 2019-10-09 PROCEDURE — 96366 THER/PROPH/DIAG IV INF ADDON: CPT

## 2019-10-09 PROCEDURE — G0378 HOSPITAL OBSERVATION PER HR: HCPCS

## 2019-10-09 PROCEDURE — 85027 COMPLETE CBC AUTOMATED: CPT

## 2019-10-09 PROCEDURE — 3700000000 HC ANESTHESIA ATTENDED CARE: Performed by: INTERNAL MEDICINE

## 2019-10-09 PROCEDURE — 94640 AIRWAY INHALATION TREATMENT: CPT

## 2019-10-09 PROCEDURE — 80053 COMPREHEN METABOLIC PANEL: CPT

## 2019-10-09 PROCEDURE — 7100000010 HC PHASE II RECOVERY - FIRST 15 MIN: Performed by: INTERNAL MEDICINE

## 2019-10-09 PROCEDURE — 83690 ASSAY OF LIPASE: CPT

## 2019-10-09 PROCEDURE — 6360000002 HC RX W HCPCS: Performed by: NURSE ANESTHETIST, CERTIFIED REGISTERED

## 2019-10-09 PROCEDURE — 3609015200 HC ERCP REMOVE CALCULI/DEBRIS BILIARY/PANCREAS DUCT: Performed by: INTERNAL MEDICINE

## 2019-10-09 PROCEDURE — 85610 PROTHROMBIN TIME: CPT

## 2019-10-09 PROCEDURE — 2720000010 HC SURG SUPPLY STERILE: Performed by: INTERNAL MEDICINE

## 2019-10-09 PROCEDURE — 2709999900 HC NON-CHARGEABLE SUPPLY: Performed by: INTERNAL MEDICINE

## 2019-10-09 PROCEDURE — C1769 GUIDE WIRE: HCPCS | Performed by: INTERNAL MEDICINE

## 2019-10-09 PROCEDURE — 6370000000 HC RX 637 (ALT 250 FOR IP): Performed by: INTERNAL MEDICINE

## 2019-10-09 PROCEDURE — 2580000003 HC RX 258: Performed by: NURSE ANESTHETIST, CERTIFIED REGISTERED

## 2019-10-09 PROCEDURE — 6360000002 HC RX W HCPCS: Performed by: NURSE PRACTITIONER

## 2019-10-09 PROCEDURE — 6360000002 HC RX W HCPCS: Performed by: INTERNAL MEDICINE

## 2019-10-09 PROCEDURE — 7100000011 HC PHASE II RECOVERY - ADDTL 15 MIN: Performed by: INTERNAL MEDICINE

## 2019-10-09 PROCEDURE — 2580000003 HC RX 258: Performed by: NURSE PRACTITIONER

## 2019-10-09 PROCEDURE — 6360000004 HC RX CONTRAST MEDICATION: Performed by: INTERNAL MEDICINE

## 2019-10-09 PROCEDURE — 82150 ASSAY OF AMYLASE: CPT

## 2019-10-09 RX ORDER — PROPOFOL 10 MG/ML
INJECTION, EMULSION INTRAVENOUS CONTINUOUS PRN
Status: DISCONTINUED | OUTPATIENT
Start: 2019-10-09 | End: 2019-10-09 | Stop reason: SDUPTHER

## 2019-10-09 RX ORDER — SODIUM CHLORIDE, SODIUM LACTATE, POTASSIUM CHLORIDE, CALCIUM CHLORIDE 600; 310; 30; 20 MG/100ML; MG/100ML; MG/100ML; MG/100ML
INJECTION, SOLUTION INTRAVENOUS CONTINUOUS PRN
Status: DISCONTINUED | OUTPATIENT
Start: 2019-10-09 | End: 2019-10-09 | Stop reason: SDUPTHER

## 2019-10-09 RX ORDER — FENTANYL CITRATE 50 UG/ML
INJECTION, SOLUTION INTRAMUSCULAR; INTRAVENOUS PRN
Status: DISCONTINUED | OUTPATIENT
Start: 2019-10-09 | End: 2019-10-09 | Stop reason: SDUPTHER

## 2019-10-09 RX ADMIN — METOPROLOL SUCCINATE 25 MG: 25 TABLET, EXTENDED RELEASE ORAL at 09:35

## 2019-10-09 RX ADMIN — HYDROMORPHONE HYDROCHLORIDE 0.5 MG: 1 INJECTION, SOLUTION INTRAMUSCULAR; INTRAVENOUS; SUBCUTANEOUS at 14:50

## 2019-10-09 RX ADMIN — ALPRAZOLAM 0.5 MG: 0.25 TABLET ORAL at 03:41

## 2019-10-09 RX ADMIN — MOMETASONE FUROATE AND FORMOTEROL FUMARATE DIHYDRATE 2 PUFF: 100; 5 AEROSOL RESPIRATORY (INHALATION) at 08:46

## 2019-10-09 RX ADMIN — ALPRAZOLAM 0.5 MG: 0.25 TABLET ORAL at 16:50

## 2019-10-09 RX ADMIN — HYDROMORPHONE HYDROCHLORIDE 0.5 MG: 1 INJECTION, SOLUTION INTRAMUSCULAR; INTRAVENOUS; SUBCUTANEOUS at 20:42

## 2019-10-09 RX ADMIN — ONDANSETRON 4 MG: 2 INJECTION INTRAMUSCULAR; INTRAVENOUS at 13:50

## 2019-10-09 RX ADMIN — PROPOFOL 275 MCG/KG/MIN: 10 INJECTION, EMULSION INTRAVENOUS at 12:49

## 2019-10-09 RX ADMIN — FENTANYL CITRATE 25 MCG: 50 INJECTION, SOLUTION INTRAMUSCULAR; INTRAVENOUS at 12:51

## 2019-10-09 RX ADMIN — FENTANYL CITRATE 25 MCG: 50 INJECTION, SOLUTION INTRAMUSCULAR; INTRAVENOUS at 12:49

## 2019-10-09 RX ADMIN — INDOMETHACIN 100 MG: 50 SUPPOSITORY RECTAL at 11:03

## 2019-10-09 RX ADMIN — FENTANYL CITRATE 25 MCG: 50 INJECTION, SOLUTION INTRAMUSCULAR; INTRAVENOUS at 12:46

## 2019-10-09 RX ADMIN — MOMETASONE FUROATE AND FORMOTEROL FUMARATE DIHYDRATE 2 PUFF: 100; 5 AEROSOL RESPIRATORY (INHALATION) at 19:59

## 2019-10-09 RX ADMIN — SODIUM CHLORIDE, POTASSIUM CHLORIDE, SODIUM LACTATE AND CALCIUM CHLORIDE 1000 ML: 600; 310; 30; 20 INJECTION, SOLUTION INTRAVENOUS at 10:03

## 2019-10-09 RX ADMIN — FENTANYL CITRATE 25 MCG: 50 INJECTION, SOLUTION INTRAMUSCULAR; INTRAVENOUS at 12:43

## 2019-10-09 RX ADMIN — SODIUM CHLORIDE 3 G: 900 INJECTION INTRAVENOUS at 11:03

## 2019-10-09 RX ADMIN — SODIUM CHLORIDE: 9 INJECTION, SOLUTION INTRAVENOUS at 16:32

## 2019-10-09 RX ADMIN — SODIUM CHLORIDE, POTASSIUM CHLORIDE, SODIUM LACTATE AND CALCIUM CHLORIDE: 600; 310; 30; 20 INJECTION, SOLUTION INTRAVENOUS at 12:12

## 2019-10-09 RX ADMIN — HYDROCODONE BITARTRATE AND ACETAMINOPHEN 1 TABLET: 10; 325 TABLET ORAL at 07:08

## 2019-10-09 RX ADMIN — IOPAMIDOL 20 ML: 612 INJECTION, SOLUTION INTRAVENOUS at 14:18

## 2019-10-09 RX ADMIN — Medication 10 ML: at 20:42

## 2019-10-09 ASSESSMENT — PAIN SCALES - GENERAL
PAINLEVEL_OUTOF10: 3
PAINLEVEL_OUTOF10: 0
PAINLEVEL_OUTOF10: 8
PAINLEVEL_OUTOF10: 5
PAINLEVEL_OUTOF10: 7
PAINLEVEL_OUTOF10: 0
PAINLEVEL_OUTOF10: 10
PAINLEVEL_OUTOF10: 10

## 2019-10-09 ASSESSMENT — PAIN DESCRIPTION - DESCRIPTORS
DESCRIPTORS: ACHING;DISCOMFORT
DESCRIPTORS: ACHING;DISCOMFORT;SORE
DESCRIPTORS: ACHING;DISCOMFORT

## 2019-10-09 ASSESSMENT — PAIN DESCRIPTION - ONSET
ONSET: ON-GOING

## 2019-10-09 ASSESSMENT — PAIN DESCRIPTION - PAIN TYPE
TYPE: ACUTE PAIN

## 2019-10-09 ASSESSMENT — PAIN DESCRIPTION - LOCATION
LOCATION: ABDOMEN
LOCATION: ABDOMEN
LOCATION: GENERALIZED

## 2019-10-09 ASSESSMENT — PAIN DESCRIPTION - FREQUENCY
FREQUENCY: CONTINUOUS

## 2019-10-09 ASSESSMENT — PAIN - FUNCTIONAL ASSESSMENT
PAIN_FUNCTIONAL_ASSESSMENT: PREVENTS OR INTERFERES SOME ACTIVE ACTIVITIES AND ADLS

## 2019-10-09 ASSESSMENT — PAIN DESCRIPTION - PROGRESSION
CLINICAL_PROGRESSION: NOT CHANGED

## 2019-10-10 VITALS
HEIGHT: 71 IN | WEIGHT: 178.44 LBS | SYSTOLIC BLOOD PRESSURE: 133 MMHG | RESPIRATION RATE: 18 BRPM | BODY MASS INDEX: 24.98 KG/M2 | TEMPERATURE: 98 F | OXYGEN SATURATION: 92 % | DIASTOLIC BLOOD PRESSURE: 80 MMHG | HEART RATE: 91 BPM

## 2019-10-10 LAB
ALBUMIN SERPL-MCNC: 3.3 G/DL (ref 3.5–5.2)
ALP BLD-CCNC: 161 U/L (ref 40–129)
ALT SERPL-CCNC: 90 U/L (ref 0–40)
AMYLASE: 41 U/L (ref 20–100)
ANION GAP SERPL CALCULATED.3IONS-SCNC: 10 MMOL/L (ref 7–16)
AST SERPL-CCNC: 31 U/L (ref 0–39)
BILIRUB SERPL-MCNC: 0.8 MG/DL (ref 0–1.2)
BILIRUBIN DIRECT: 0.2 MG/DL (ref 0–0.3)
BILIRUBIN, INDIRECT: 0.6 MG/DL (ref 0–1)
BUN BLDV-MCNC: 7 MG/DL (ref 8–23)
CALCIUM SERPL-MCNC: 8.7 MG/DL (ref 8.6–10.2)
CHLORIDE BLD-SCNC: 102 MMOL/L (ref 98–107)
CO2: 27 MMOL/L (ref 22–29)
CREAT SERPL-MCNC: 0.7 MG/DL (ref 0.7–1.2)
GFR AFRICAN AMERICAN: >60
GFR NON-AFRICAN AMERICAN: >60 ML/MIN/1.73
GLUCOSE BLD-MCNC: 101 MG/DL (ref 74–99)
HCT VFR BLD CALC: 43.2 % (ref 37–54)
HEMOGLOBIN: 14.4 G/DL (ref 12.5–16.5)
LIPASE: 14 U/L (ref 13–60)
MCH RBC QN AUTO: 30.2 PG (ref 26–35)
MCHC RBC AUTO-ENTMCNC: 33.3 % (ref 32–34.5)
MCV RBC AUTO: 90.6 FL (ref 80–99.9)
PDW BLD-RTO: 12.5 FL (ref 11.5–15)
PLATELET # BLD: 129 E9/L (ref 130–450)
PMV BLD AUTO: 10.6 FL (ref 7–12)
POTASSIUM SERPL-SCNC: 3.8 MMOL/L (ref 3.5–5)
RBC # BLD: 4.77 E12/L (ref 3.8–5.8)
SODIUM BLD-SCNC: 139 MMOL/L (ref 132–146)
TOTAL PROTEIN: 5.5 G/DL (ref 6.4–8.3)
WBC # BLD: 5.8 E9/L (ref 4.5–11.5)

## 2019-10-10 PROCEDURE — 80053 COMPREHEN METABOLIC PANEL: CPT

## 2019-10-10 PROCEDURE — 6370000000 HC RX 637 (ALT 250 FOR IP): Performed by: INTERNAL MEDICINE

## 2019-10-10 PROCEDURE — 2580000003 HC RX 258: Performed by: INTERNAL MEDICINE

## 2019-10-10 PROCEDURE — 6360000002 HC RX W HCPCS

## 2019-10-10 PROCEDURE — 36415 COLL VENOUS BLD VENIPUNCTURE: CPT

## 2019-10-10 PROCEDURE — 82150 ASSAY OF AMYLASE: CPT

## 2019-10-10 PROCEDURE — 83690 ASSAY OF LIPASE: CPT

## 2019-10-10 PROCEDURE — 96376 TX/PRO/DX INJ SAME DRUG ADON: CPT

## 2019-10-10 PROCEDURE — G0378 HOSPITAL OBSERVATION PER HR: HCPCS

## 2019-10-10 PROCEDURE — 85027 COMPLETE CBC AUTOMATED: CPT

## 2019-10-10 PROCEDURE — 94640 AIRWAY INHALATION TREATMENT: CPT

## 2019-10-10 PROCEDURE — 82248 BILIRUBIN DIRECT: CPT

## 2019-10-10 PROCEDURE — 96366 THER/PROPH/DIAG IV INF ADDON: CPT

## 2019-10-10 RX ADMIN — SODIUM CHLORIDE: 9 INJECTION, SOLUTION INTRAVENOUS at 01:53

## 2019-10-10 RX ADMIN — MOMETASONE FUROATE AND FORMOTEROL FUMARATE DIHYDRATE 2 PUFF: 100; 5 AEROSOL RESPIRATORY (INHALATION) at 09:13

## 2019-10-10 RX ADMIN — SERTRALINE 100 MG: 100 TABLET, FILM COATED ORAL at 09:38

## 2019-10-10 RX ADMIN — HYDROCODONE BITARTRATE AND ACETAMINOPHEN 1 TABLET: 10; 325 TABLET ORAL at 09:47

## 2019-10-10 RX ADMIN — METOPROLOL SUCCINATE 25 MG: 25 TABLET, EXTENDED RELEASE ORAL at 09:38

## 2019-10-10 RX ADMIN — LISINOPRIL 2.5 MG: 5 TABLET ORAL at 09:38

## 2019-10-10 RX ADMIN — ALPRAZOLAM 0.5 MG: 0.25 TABLET ORAL at 01:53

## 2019-10-10 RX ADMIN — HYDROCODONE BITARTRATE AND ACETAMINOPHEN 1 TABLET: 10; 325 TABLET ORAL at 01:59

## 2019-10-10 ASSESSMENT — PAIN DESCRIPTION - PROGRESSION
CLINICAL_PROGRESSION: NOT CHANGED
CLINICAL_PROGRESSION: NOT CHANGED

## 2019-10-10 ASSESSMENT — PAIN SCALES - GENERAL
PAINLEVEL_OUTOF10: 7
PAINLEVEL_OUTOF10: 3
PAINLEVEL_OUTOF10: 6

## 2019-10-10 ASSESSMENT — PAIN DESCRIPTION - PAIN TYPE
TYPE: ACUTE PAIN
TYPE: ACUTE PAIN

## 2019-10-10 ASSESSMENT — PAIN DESCRIPTION - ONSET
ONSET: ON-GOING
ONSET: ON-GOING

## 2019-10-10 ASSESSMENT — PAIN DESCRIPTION - LOCATION
LOCATION: ABDOMEN
LOCATION: ABDOMEN

## 2019-10-10 ASSESSMENT — PAIN DESCRIPTION - DESCRIPTORS
DESCRIPTORS: ACHING;DISCOMFORT
DESCRIPTORS: ACHING;DISCOMFORT;SORE

## 2019-10-10 ASSESSMENT — PAIN DESCRIPTION - FREQUENCY
FREQUENCY: CONTINUOUS
FREQUENCY: CONTINUOUS

## 2019-10-13 NOTE — DISCHARGE SUMMARY
Physician Discharge Summary     Patient ID:  John Jarvis  [id number]  60 y.o.  1955    Admit date: 9/4/2019    Discharge date and time: 9/9/2019  1:39 PM     Admission Diagnoses: Active Problems:    * No active hospital problems. *  Resolved Problems:    Pancreatitis, unspecified pancreatitis type      Discharge Diagnoses: Active Problems:    * No active hospital problems. *  Resolved Problems:    Pancreatitis, unspecified pancreatitis type      Condition at discharge : Stable    Consults: IP CONSULT TO INTERNAL MEDICINE  IP CONSULT TO GENERAL SURGERY  IP CONSULT TO INFECTIOUS DISEASES    Procedures: Laparoscopic cystectomy    Hospital Course: Patient is a 72-year-old gentleman presents to the emergency room with epigastric abdominal pain, lower chest pain. He also had sweats. Symptoms started after he ate a meal.  Patient was evaluated in the emergency room and was felt to have acute pancreatitis. Ultrasound of the gallbladder was performed and this was consistent with cholelithiasis. Patient was admitted made n.p.o., general surgery was consulted. IV hydration was started. Patient underwent laparoscopic cholecystectomy. Elevated liver enzymes were felt to be from common bile duct stone. It was felt the patient may need ERCP as outpatient. Patient was then discharged home in stable condition. FLUORO FOR SURGICAL PROCEDURES   Final Result   Fluoroscopic guidance for intraoperative cholangiogram. See procedural   report for further details. XR CHEST STANDARD (2 VW)   Final Result   No acute process and unchanged               NM HEPATOBILIARY   Final Result   Patent cystic and common bile ducts. US ABDOMEN LIMITED   Final Result   Cholelithiasis.           CT ABDOMEN PELVIS W IV CONTRAST Additional Contrast? None   Final Result   Inflammatory changes with edema , wall thickening and enhancement of   the pylorus, duodenum with inflammation extending to the pancreatic   head and uncinate processes concerning for   gastroduodenitis/pancreatitis. There may also be cholecystitis with a   distended inflamed gallbladder. Multiple infiltrative hypodense lesions in the right hepatic lobe for   which benign and malignant etiologies are considered. Further   assessment and surveillance by MRI or PET/CT scan is recommended. CTA CHEST W CONTRAST   Final Result   No evidence of pulmonary embolism, organized pneumonia, pleural   effusion, or cardiac decompensation. Noncalcified and calcified granulomatous scarring is noted in the   lungs, with both lungs having nodules measuring up to 7 mm diameter. [place] Ple[place] criteria would recommend reevaluation in 6-12 months   to document stability. Mild aortic aneurysmal dilation without acute complications. Maximum   diameter measurements are currently in the 3.5 cm range.          XR CHEST STANDARD (2 VW)   Final Result   NO ACUTE CARDIOPULMONARY PROCESS      Chronic obstructive pulmonary disease              Results for orders placed or performed during the hospital encounter of 10/07/19 (from the past 336 hour(s))   Protime-INR    Collection Time: 10/07/19 11:28 AM   Result Value Ref Range    Protime 11.5 9.3 - 12.4 sec    INR 1.0    CBC    Collection Time: 10/08/19  4:10 AM   Result Value Ref Range    WBC 6.3 4.5 - 11.5 E9/L    RBC 4.85 3.80 - 5.80 E12/L    Hemoglobin 14.8 12.5 - 16.5 g/dL    Hematocrit 44.8 37.0 - 54.0 %    MCV 92.4 80.0 - 99.9 fL    MCH 30.5 26.0 - 35.0 pg    MCHC 33.0 32.0 - 34.5 %    RDW 12.6 11.5 - 15.0 fL    Platelets 602 (L) 609 - 450 E9/L    MPV 10.0 7.0 - 12.0 fL   Hepatic Function Panel    Collection Time: 10/08/19  4:10 AM   Result Value Ref Range    Total Protein 5.6 (L) 6.4 - 8.3 g/dL    Alb 3.5 3.5 - 5.2 g/dL    Alkaline Phosphatase 205 (H) 40 - 129 U/L     (H) 0 - 40 U/L     (H) 0 - 39 U/L    Total Bilirubin 0.8 0.0 - 1.2 mg/dL    Bilirubin, Direct <0.2 0.0 - 0.3 mg/dL    Bilirubin,

## 2020-01-17 ENCOUNTER — OFFICE VISIT (OUTPATIENT)
Dept: ENT CLINIC | Age: 65
End: 2020-01-17
Payer: COMMERCIAL

## 2020-01-17 VITALS
HEIGHT: 71 IN | SYSTOLIC BLOOD PRESSURE: 133 MMHG | BODY MASS INDEX: 25.2 KG/M2 | DIASTOLIC BLOOD PRESSURE: 78 MMHG | WEIGHT: 180 LBS | OXYGEN SATURATION: 92 % | HEART RATE: 75 BPM

## 2020-01-17 PROCEDURE — 99204 OFFICE O/P NEW MOD 45 MIN: CPT | Performed by: OTOLARYNGOLOGY

## 2020-01-17 ASSESSMENT — ENCOUNTER SYMPTOMS
SINUS PAIN: 0
PHOTOPHOBIA: 0
ABDOMINAL DISTENTION: 0
SINUS PRESSURE: 0
COLOR CHANGE: 0
TROUBLE SWALLOWING: 0
CHEST TIGHTNESS: 0
EYE PAIN: 0
SHORTNESS OF BREATH: 0
ABDOMINAL PAIN: 0

## 2020-01-17 NOTE — PROGRESS NOTES
Neck:      Musculoskeletal: Normal range of motion and neck supple. Cardiovascular:      Rate and Rhythm: Normal rate. Heart sounds: Normal heart sounds. Pulmonary:      Effort: Pulmonary effort is normal.      Breath sounds: Normal breath sounds. Abdominal:      General: Bowel sounds are normal. There is no distension. Palpations: Abdomen is soft. Skin:     General: Skin is warm and dry. Neurological:      Mental Status: He is alert and oriented to person, place, and time. Psychiatric:         Behavior: Behavior normal.                 Assessment:       Diagnosis Orders   1. Irritation of external ear canal, right     2. Eczema of external ear, right                Plan:      Apply Bactroban ointment to R ear canal 3 times daily  Followup in 2 year(s)                   Natan Augustin  1955    I have discussed the case, including pertinent history and exam findings with the resident. I have seen and examined the patient and the key elements of the encounter have been performed by me. I agree with the assessment, plan and orders as documented by the  resident              Remainder of medical problems as per  resident note. Patient seen and examined. Agree with above exam, assessment and plan.       Electronically signed by Senthil Potter DO on 1/22/20 at 8:04 AM

## 2021-03-11 ENCOUNTER — OFFICE VISIT (OUTPATIENT)
Dept: VASCULAR SURGERY | Age: 66
End: 2021-03-11
Payer: COMMERCIAL

## 2021-03-11 VITALS — BODY MASS INDEX: 23.8 KG/M2 | WEIGHT: 170 LBS | HEIGHT: 71 IN

## 2021-03-11 DIAGNOSIS — M79.89 LEG SWELLING: ICD-10-CM

## 2021-03-11 DIAGNOSIS — R09.89 DECREASED DORSALIS PEDIS PULSE: ICD-10-CM

## 2021-03-11 DIAGNOSIS — I89.0 LYMPHEDEMA OF BOTH LOWER EXTREMITIES: ICD-10-CM

## 2021-03-11 PROCEDURE — 99204 OFFICE O/P NEW MOD 45 MIN: CPT | Performed by: SURGERY

## 2021-03-11 NOTE — PROGRESS NOTES
Chief Complaint:   Chief Complaint   Patient presents with    Consultation     new pt. left lower leg is swelling         HPI: Patient came to the office, for evaluation of swelling of both legs with puffiness of the feet, going on for last 3 months, underwent a venous ultrasound study, revealed no evidence of deep vein thrombosis and patient was referred by his PCP for further evaluation    Patient denies any history of congestive heart failure    Patient denies any history of previous episodes of deep vein thrombosis, varicose veins etc.    Patient is currently being followed by his cardiologist did undergo a 2D echo of the heart      Patient denies any focal lateralizing neurological symptoms like loss of speech, vision or loss of function of extremity    Patient can walk a few blocks , and denies any symptoms of rest pain    No Known Allergies    Current Outpatient Medications   Medication Sig Dispense Refill    HYDROcodone-acetaminophen (NORCO)  MG per tablet Take 1 tablet by mouth every 6 hours as needed for Pain for up to 30 days. 120 tablet 0    hydroCHLOROthiazide (HYDRODIURIL) 25 MG tablet TAKE 1 TABLET BY MOUTH EVERY DAY IN THE MORNING 30 tablet 5    ALPRAZolam (XANAX) 0.5 MG tablet Take 1 tablet by mouth 2 times daily as needed for Sleep for up to 30 days. 60 tablet 0    sertraline (ZOLOFT) 100 MG tablet TAKE ONE AND ONE-HALF TABLETS DAILY 135 tablet 3    meloxicam (MOBIC) 15 MG tablet TAKE 1 TABLET DAILY 90 tablet 3    fluticasone-salmeterol (WIXELA INHUB) 100-50 MCG/DOSE diskus inhaler Inhale 1 puff into the lungs every 12 hours 60 each 3    aspirin 81 MG tablet Take 1 tablet by mouth daily DO NOT TAKE ASPIRIN FOR 1 WEEK 30 tablet 3    nitroGLYCERIN (NITROSTAT) 0.4 MG SL tablet Place 1 tablet under the tongue every 5 minutes as needed for Chest pain up to max of 3 total doses. If no relief after 1 dose, call 911.  (Patient taking differently: Place 0.4 mg under the tongue every 5 minutes as needed for Chest pain up to max of 3 total doses. If no relief after 1 dose, call 911. Has never used this) 25 tablet 1    lisinopril (PRINIVIL;ZESTRIL) 2.5 MG tablet Take 1 tablet by mouth daily 90 tablet 3    metoprolol succinate (TOPROL XL) 25 MG extended release tablet Take 1 tablet by mouth daily (Patient taking differently: Take 25 mg by mouth daily Instructed to take am of procedure) 90 tablet 3    sildenafil (VIAGRA) 100 MG tablet Take 100 mg by mouth as needed for Erectile Dysfunction Instructed to not use 24 hours pre-op       No current facility-administered medications for this visit. Past Medical History:   Diagnosis Date    CAD (coronary artery disease)     Dr. Suzanne Dean yearly     [facility]     controlled with inhalers     Decreased dorsalis pedis pulse 3/11/2021    Depression     Enlarged prostate without lower urinary tract symptoms (luts)     Gallstone     for OR 10-7-19     Hypertension     Leg swelling     Lymphedema of both lower extremities 3/11/2021    Vitamin D deficiency        Past Surgical History:   Procedure Laterality Date    CARDIAC SURGERY      heart cath w/ stent 2012     CHOLECYSTECTOMY, LAPAROSCOPIC N/A 9/8/2019    CHOLECYSTECTOMY LAPAROSCOPIC, INTRA OPERATIVE CHOLANGIOGRAM performed by Pardeep Moncada MD at [facility] ERCP N/A 10/7/2019    ERCP STENT INSERTION performed by Niles Doan MD at [address] ERCP N/A 10/9/2019    ERCP STONE REMOVAL performed by Niles Doan MD at [facility]      2006 - inguinal left w/ mesh        History reviewed. No pertinent family history.     Social History     Socioeconomic History    Marital status:      Spouse name: Not on file    Number of children: Not on file    Years of education: Not on file    Highest education level: Not on file   Occupational History    Not on file   Social Needs    Financial resource strain: Not on file    Food insecurity     Worry: Not on file     Inability: Not on file    Transportation needs     Medical: Not on file     Non-medical: Not on file   Tobacco Use    Smoking status: Current Every Day Smoker     Packs/day: 1.00     Types: Cigarettes    Smokeless tobacco: Never Used   Substance and Sexual Activity    Alcohol use: Not Currently    Drug use: Never    Sexual activity: Not on file   Lifestyle    Physical activity     Days per week: Not on file     Minutes per session: Not on file    Stress: Not on file   Relationships    Social connections     Talks on phone: Not on file     Gets together: Not on file     Attends Yazidism service: Not on file     Active member of club or organization: Not on file     Attends meetings of clubs or organizations: Not on file     Relationship status: Not on file    Intimate partner violence     Fear of current or ex partner: Not on file     Emotionally abused: Not on file     Physically abused: Not on file     Forced sexual activity: Not on file   Other Topics Concern    Not on file   Social History Narrative    Not on file       Review of Systems:  Skin:  No abnormal pigmentation or rash  Eyes:  No blurring, diplopia or vision loss  Ears/Nose/Throat:  No hearing loss or vertigo  Respiratory:  No cough, pleuritic chest pain, dyspnea, or wheezing. Chronic obstructive lung disease  Cardiovascular: No angina, palpitations . Coronary artery disease, hypertension, hyperlipidemia, valvular heart disease, history of moderately severe pulmonary hypertension was mentioned on the 2D echo  Gastrointestinal:  No nausea or vomiting; no abdominal pain or rectal bleeding  Musculoskeletal:  No arthritis or weakness. Neurologic:  No paralysis, paresis, paresthesia, seizures or headaches  Hematologic/Lymphatic/Immunologic:  No anemia, abnormal bleeding/bruising, fever, chills or night sweats. Endocrine:  No heat or cold intolerance.   No polyphagia, polydipsia or polyuria. Physical Exam:  General appearance:  Alert, awake, oriented x 3. No distress. Skin:  Warm and dry  Head:  Normocephalic. No masses, lesions or tenderness  Eyes:  Conjunctivae appear normal; PERRL  Ears:  External ears normal  Nose/Sinuses:  Septum midline, mucosa normal; no drainage  Oropharynx:  Clear, no exudate noted  Neck:  No jugular venous distention, lymphadenopathy or thyromegaly. No evidence of carotid bruit  Lungs:  Clear to ausculation bilaterally. No rhonchi, crackles, wheezes  Heart:  Regular rate and rhythm. No rub or murmur  Abdomen:  Soft, non-tender. No masses, organomegaly. Musculoskeletal : No joint effusions, tenderness swelling    Neuro: Speech is intact. Moving all extremities. No focal motor or sensory deficits      Extremities:  Both feet are warm to touch. The color of both feet is normal.    Patient does have moderate swelling of both legs and feet left leg moderate, right leg mild, no calf tenderness clinically consistent with lymphedema without any varicose veins, no cellulitis    Pulses Right  Left    Brachial 3 3    Radial    3=normal   Femoral 2 2  2=diminished   Popliteal    1=barely palpable   Dorsalis pedis 1 1  0=absent   Posterior tibial    4=aneurysmal             Other pertinent information:1. The past medical records were reviewed. 2.  The venous ultrasound study of both the legs done at [facility] normal no evidence of deep vein thrombosis    3.  2D echo of the heart revealed satisfactory left renal function with mildly decreased right ventricle, with mention of moderately severe pulmonary hypertension    4. The CMP revealed satisfactory serum albumin level of 3.8    Assessment:    1. Lymphedema of both lower extremities    2. Leg swelling    3.  Decreased dorsalis pedis pulse              Plan:       Discussed the patient, options, risks benefits and also explained, clinically his symptoms and findings are consistent with lymphedema assess

## 2021-03-23 ENCOUNTER — HOSPITAL ENCOUNTER (OUTPATIENT)
Age: 66
Discharge: HOME | End: 2021-03-23
Payer: COMMERCIAL

## 2021-03-23 ENCOUNTER — HOSPITAL ENCOUNTER (OUTPATIENT)
Dept: INTERVENTIONAL RADIOLOGY/VASCULAR | Age: 66
Discharge: HOME | End: 2021-03-25
Payer: COMMERCIAL

## 2021-03-23 ENCOUNTER — HOSPITAL ENCOUNTER (OUTPATIENT)
Dept: CT IMAGING | Age: 66
Discharge: HOME | End: 2021-03-25
Payer: COMMERCIAL

## 2021-03-23 DIAGNOSIS — I89.0 LYMPHEDEMA OF BOTH LOWER EXTREMITIES: ICD-10-CM

## 2021-03-23 DIAGNOSIS — R09.89 DECREASED DORSALIS PEDIS PULSE: ICD-10-CM

## 2021-03-23 DIAGNOSIS — M79.89 LEG SWELLING: ICD-10-CM

## 2021-03-23 LAB
ALBUMIN SERPL-MCNC: 4 G/DL (ref 3.5–5.2)
ALP BLD-CCNC: 145 U/L (ref 40–129)
ALT SERPL-CCNC: 19 U/L (ref 0–40)
ANION GAP SERPL CALCULATED.3IONS-SCNC: 7 MMOL/L (ref 7–16)
AST SERPL-CCNC: 20 U/L (ref 0–39)
BILIRUB SERPL-MCNC: 0.8 MG/DL (ref 0–1.2)
BUN BLDV-MCNC: 13 MG/DL (ref 8–23)
CALCIUM SERPL-MCNC: 8.9 MG/DL (ref 8.6–10.2)
CHLORIDE BLD-SCNC: 99 MMOL/L (ref 98–107)
CO2: 31 MMOL/L (ref 22–29)
CREAT SERPL-MCNC: 0.7 MG/DL (ref 0.7–1.2)
GFR AFRICAN AMERICAN: >60
GFR NON-AFRICAN AMERICAN: >60 ML/MIN/1.73
GLUCOSE BLD-MCNC: 138 MG/DL (ref 74–99)
POTASSIUM SERPL-SCNC: 4.3 MMOL/L (ref 3.5–5)
SODIUM BLD-SCNC: 137 MMOL/L (ref 132–146)
TOTAL PROTEIN: 6.8 G/DL (ref 6.4–8.3)

## 2021-03-23 PROCEDURE — 74177 CT ABD & PELVIS W/CONTRAST: CPT

## 2021-03-23 PROCEDURE — 36415 COLL VENOUS BLD VENIPUNCTURE: CPT

## 2021-03-23 PROCEDURE — 93922 UPR/L XTREMITY ART 2 LEVELS: CPT

## 2021-03-23 PROCEDURE — 6360000004 HC RX CONTRAST MEDICATION: Performed by: RADIOLOGY

## 2021-03-23 PROCEDURE — 80053 COMPREHEN METABOLIC PANEL: CPT

## 2021-03-23 RX ADMIN — IOHEXOL 50 ML: 240 INJECTION, SOLUTION INTRATHECAL; INTRAVASCULAR; INTRAVENOUS; ORAL at 14:21

## 2021-03-23 RX ADMIN — IOPAMIDOL 75 ML: 755 INJECTION, SOLUTION INTRAVENOUS at 14:22

## 2021-03-26 ENCOUNTER — TELEPHONE (OUTPATIENT)
Dept: VASCULAR SURGERY | Age: 66
End: 2021-03-26

## 2021-03-29 ENCOUNTER — TELEPHONE (OUTPATIENT)
Dept: VASCULAR SURGERY | Age: 66
End: 2021-03-29

## 2021-03-29 DIAGNOSIS — I89.0 LYMPHEDEMA OF BOTH LOWER EXTREMITIES: Primary | ICD-10-CM

## 2021-03-29 NOTE — TELEPHONE ENCOUNTER
Discussed the patient regarding the test results, DEEP, CT scan, satisfactory, continue with lymphedema therapy, call as needed if any further symptoms

## 2021-03-29 NOTE — TELEPHONE ENCOUNTER
Messages left both on the home telephone and cell phone regarding ankle-brachial index, satisfactory arterial flow, CAT scan of abdomen, no major abnormal findings to explain the swelling, patient recommended lymphedema therapy and to call the office if any questions

## 2021-09-21 PROBLEM — N20.0 KIDNEY STONE: Status: ACTIVE | Noted: 2021-09-21

## 2021-09-21 PROBLEM — R31.9 HEMATURIA: Status: ACTIVE | Noted: 2021-09-21

## 2021-09-21 PROBLEM — M54.50 LOW BACK PAIN: Status: ACTIVE | Noted: 2021-09-21

## 2021-10-07 DIAGNOSIS — I89.0 LYMPHEDEMA OF BOTH LOWER EXTREMITIES: Primary | ICD-10-CM

## 2021-10-17 ENCOUNTER — APPOINTMENT (OUTPATIENT)
Dept: GENERAL RADIOLOGY | Age: 66
DRG: 190 | End: 2021-10-17
Payer: COMMERCIAL

## 2021-10-17 ENCOUNTER — HOSPITAL ENCOUNTER (INPATIENT)
Age: 66
LOS: 2 days | Discharge: HOME | DRG: 190 | End: 2021-10-19
Attending: EMERGENCY MEDICINE | Admitting: INTERNAL MEDICINE
Payer: COMMERCIAL

## 2021-10-17 ENCOUNTER — APPOINTMENT (OUTPATIENT)
Dept: CT IMAGING | Age: 66
DRG: 190 | End: 2021-10-17
Payer: COMMERCIAL

## 2021-10-17 DIAGNOSIS — J96.01 ACUTE RESPIRATORY FAILURE WITH HYPOXIA (HCC): Primary | ICD-10-CM

## 2021-10-17 DIAGNOSIS — J18.9 PNEUMONIA OF RIGHT LUNG DUE TO INFECTIOUS ORGANISM, UNSPECIFIED PART OF LUNG: ICD-10-CM

## 2021-10-17 PROBLEM — J96.21 ACUTE ON CHRONIC RESPIRATORY FAILURE WITH HYPOXIA (HCC): Status: ACTIVE | Noted: 2021-10-17

## 2021-10-17 LAB
ALBUMIN SERPL-MCNC: 3.9 G/DL (ref 3.5–5.2)
ALP BLD-CCNC: 160 U/L (ref 40–129)
ALT SERPL-CCNC: 17 U/L (ref 0–40)
ANION GAP SERPL CALCULATED.3IONS-SCNC: 12 MMOL/L (ref 7–16)
AST SERPL-CCNC: 27 U/L (ref 0–39)
BASOPHILS ABSOLUTE: 0.04 E9/L (ref 0–0.2)
BASOPHILS RELATIVE PERCENT: 0.4 % (ref 0–2)
BILIRUB SERPL-MCNC: 1.2 MG/DL (ref 0–1.2)
BUN BLDV-MCNC: 27 MG/DL (ref 6–23)
CALCIUM SERPL-MCNC: 9.3 MG/DL (ref 8.6–10.2)
CHLORIDE BLD-SCNC: 90 MMOL/L (ref 98–107)
CO2: 30 MMOL/L (ref 22–29)
CREAT SERPL-MCNC: 0.6 MG/DL (ref 0.7–1.2)
EOSINOPHILS ABSOLUTE: 0.01 E9/L (ref 0.05–0.5)
EOSINOPHILS RELATIVE PERCENT: 0.1 % (ref 0–6)
GFR AFRICAN AMERICAN: >60
GFR NON-AFRICAN AMERICAN: >60 ML/MIN/1.73
GLUCOSE BLD-MCNC: 160 MG/DL (ref 74–99)
HCT VFR BLD CALC: 55.9 % (ref 37–54)
HEMOGLOBIN: 17.8 G/DL (ref 12.5–16.5)
IMMATURE GRANULOCYTES #: 0.04 E9/L
IMMATURE GRANULOCYTES %: 0.4 % (ref 0–5)
LACTIC ACID: 2.2 MMOL/L (ref 0.5–2.2)
LIPASE: 12 U/L (ref 13–60)
LYMPHOCYTES ABSOLUTE: 1.07 E9/L (ref 1.5–4)
LYMPHOCYTES RELATIVE PERCENT: 11.4 % (ref 20–42)
MCH RBC QN AUTO: 28.8 PG (ref 26–35)
MCHC RBC AUTO-ENTMCNC: 31.8 % (ref 32–34.5)
MCV RBC AUTO: 90.5 FL (ref 80–99.9)
MONOCYTES ABSOLUTE: 0.68 E9/L (ref 0.1–0.95)
MONOCYTES RELATIVE PERCENT: 7.3 % (ref 2–12)
NEUTROPHILS ABSOLUTE: 7.51 E9/L (ref 1.8–7.3)
NEUTROPHILS RELATIVE PERCENT: 80.4 % (ref 43–80)
PDW BLD-RTO: 14.3 FL (ref 11.5–15)
PLATELET # BLD: 214 E9/L (ref 130–450)
PMV BLD AUTO: 10.6 FL (ref 7–12)
POTASSIUM REFLEX MAGNESIUM: 4.6 MMOL/L (ref 3.5–5)
PRO-BNP: 3782 PG/ML (ref 0–125)
RBC # BLD: 6.18 E12/L (ref 3.8–5.8)
SARS-COV-2, NAAT: NOT DETECTED
SODIUM BLD-SCNC: 132 MMOL/L (ref 132–146)
TOTAL PROTEIN: 6.6 G/DL (ref 6.4–8.3)
TROPONIN, HIGH SENSITIVITY: 13 NG/L (ref 0–11)
WBC # BLD: 9.4 E9/L (ref 4.5–11.5)

## 2021-10-17 PROCEDURE — 6360000002 HC RX W HCPCS: Performed by: EMERGENCY MEDICINE

## 2021-10-17 PROCEDURE — 6370000000 HC RX 637 (ALT 250 FOR IP): Performed by: INTERNAL MEDICINE

## 2021-10-17 PROCEDURE — 36415 COLL VENOUS BLD VENIPUNCTURE: CPT

## 2021-10-17 PROCEDURE — 6360000002 HC RX W HCPCS: Performed by: INTERNAL MEDICINE

## 2021-10-17 PROCEDURE — 94660 CPAP INITIATION&MGMT: CPT

## 2021-10-17 PROCEDURE — 83605 ASSAY OF LACTIC ACID: CPT

## 2021-10-17 PROCEDURE — 2140000000 HC CCU INTERMEDIATE R&B

## 2021-10-17 PROCEDURE — 85025 COMPLETE CBC W/AUTO DIFF WBC: CPT

## 2021-10-17 PROCEDURE — 96366 THER/PROPH/DIAG IV INF ADDON: CPT

## 2021-10-17 PROCEDURE — 94664 DEMO&/EVAL PT USE INHALER: CPT

## 2021-10-17 PROCEDURE — 94640 AIRWAY INHALATION TREATMENT: CPT

## 2021-10-17 PROCEDURE — 6370000000 HC RX 637 (ALT 250 FOR IP): Performed by: EMERGENCY MEDICINE

## 2021-10-17 PROCEDURE — 82803 BLOOD GASES ANY COMBINATION: CPT

## 2021-10-17 PROCEDURE — 96375 TX/PRO/DX INJ NEW DRUG ADDON: CPT

## 2021-10-17 PROCEDURE — 6360000004 HC RX CONTRAST MEDICATION: Performed by: RADIOLOGY

## 2021-10-17 PROCEDURE — 96365 THER/PROPH/DIAG IV INF INIT: CPT

## 2021-10-17 PROCEDURE — 84484 ASSAY OF TROPONIN QUANT: CPT

## 2021-10-17 PROCEDURE — 83880 ASSAY OF NATRIURETIC PEPTIDE: CPT

## 2021-10-17 PROCEDURE — 5A09357 ASSISTANCE WITH RESPIRATORY VENTILATION, LESS THAN 24 CONSECUTIVE HOURS, CONTINUOUS POSITIVE AIRWAY PRESSURE: ICD-10-PCS | Performed by: INTERNAL MEDICINE

## 2021-10-17 PROCEDURE — 71045 X-RAY EXAM CHEST 1 VIEW: CPT

## 2021-10-17 PROCEDURE — 87635 SARS-COV-2 COVID-19 AMP PRB: CPT

## 2021-10-17 PROCEDURE — 83690 ASSAY OF LIPASE: CPT

## 2021-10-17 PROCEDURE — 80053 COMPREHEN METABOLIC PANEL: CPT

## 2021-10-17 PROCEDURE — 71275 CT ANGIOGRAPHY CHEST: CPT

## 2021-10-17 PROCEDURE — 93005 ELECTROCARDIOGRAM TRACING: CPT | Performed by: EMERGENCY MEDICINE

## 2021-10-17 PROCEDURE — 87040 BLOOD CULTURE FOR BACTERIA: CPT

## 2021-10-17 PROCEDURE — 99284 EMERGENCY DEPT VISIT MOD MDM: CPT

## 2021-10-17 PROCEDURE — 2580000003 HC RX 258: Performed by: INTERNAL MEDICINE

## 2021-10-17 RX ORDER — ARFORMOTEROL TARTRATE 15 UG/2ML
15 SOLUTION RESPIRATORY (INHALATION) 2 TIMES DAILY
Status: DISCONTINUED | OUTPATIENT
Start: 2021-10-17 | End: 2021-10-19 | Stop reason: HOSPADM

## 2021-10-17 RX ORDER — HYDROCHLOROTHIAZIDE 25 MG/1
25 TABLET ORAL DAILY
Status: DISCONTINUED | OUTPATIENT
Start: 2021-10-17 | End: 2021-10-19 | Stop reason: HOSPADM

## 2021-10-17 RX ORDER — METOPROLOL SUCCINATE 25 MG/1
25 TABLET, EXTENDED RELEASE ORAL DAILY
Status: DISCONTINUED | OUTPATIENT
Start: 2021-10-17 | End: 2021-10-19 | Stop reason: HOSPADM

## 2021-10-17 RX ORDER — LISINOPRIL 5 MG/1
2.5 TABLET ORAL DAILY
Status: DISCONTINUED | OUTPATIENT
Start: 2021-10-17 | End: 2021-10-19 | Stop reason: HOSPADM

## 2021-10-17 RX ORDER — ASPIRIN 81 MG/1
81 TABLET, CHEWABLE ORAL DAILY
Status: DISCONTINUED | OUTPATIENT
Start: 2021-10-17 | End: 2021-10-19 | Stop reason: HOSPADM

## 2021-10-17 RX ORDER — SODIUM CHLORIDE 0.9 % (FLUSH) 0.9 %
5-40 SYRINGE (ML) INJECTION EVERY 12 HOURS SCHEDULED
Status: DISCONTINUED | OUTPATIENT
Start: 2021-10-17 | End: 2021-10-19 | Stop reason: HOSPADM

## 2021-10-17 RX ORDER — SERTRALINE HYDROCHLORIDE 100 MG/1
150 TABLET, FILM COATED ORAL DAILY
Status: DISCONTINUED | OUTPATIENT
Start: 2021-10-17 | End: 2021-10-19 | Stop reason: HOSPADM

## 2021-10-17 RX ORDER — LEVOFLOXACIN 5 MG/ML
750 INJECTION, SOLUTION INTRAVENOUS EVERY 24 HOURS
Status: DISCONTINUED | OUTPATIENT
Start: 2021-10-18 | End: 2021-10-18

## 2021-10-17 RX ORDER — ACETAMINOPHEN 650 MG/1
650 SUPPOSITORY RECTAL EVERY 6 HOURS PRN
Status: DISCONTINUED | OUTPATIENT
Start: 2021-10-17 | End: 2021-10-19 | Stop reason: HOSPADM

## 2021-10-17 RX ORDER — LEVOFLOXACIN 5 MG/ML
750 INJECTION, SOLUTION INTRAVENOUS ONCE
Status: COMPLETED | OUTPATIENT
Start: 2021-10-17 | End: 2021-10-17

## 2021-10-17 RX ORDER — BUDESONIDE 0.5 MG/2ML
0.5 INHALANT ORAL 2 TIMES DAILY
Status: DISCONTINUED | OUTPATIENT
Start: 2021-10-17 | End: 2021-10-19 | Stop reason: HOSPADM

## 2021-10-17 RX ORDER — ACETAMINOPHEN 325 MG/1
650 TABLET ORAL EVERY 6 HOURS PRN
Status: DISCONTINUED | OUTPATIENT
Start: 2021-10-17 | End: 2021-10-19 | Stop reason: HOSPADM

## 2021-10-17 RX ORDER — IPRATROPIUM BROMIDE AND ALBUTEROL SULFATE 2.5; .5 MG/3ML; MG/3ML
1 SOLUTION RESPIRATORY (INHALATION)
Status: DISCONTINUED | OUTPATIENT
Start: 2021-10-17 | End: 2021-10-19 | Stop reason: HOSPADM

## 2021-10-17 RX ORDER — SODIUM CHLORIDE 0.9 % (FLUSH) 0.9 %
5-40 SYRINGE (ML) INJECTION PRN
Status: DISCONTINUED | OUTPATIENT
Start: 2021-10-17 | End: 2021-10-19 | Stop reason: HOSPADM

## 2021-10-17 RX ORDER — METHYLPREDNISOLONE SODIUM SUCCINATE 125 MG/2ML
125 INJECTION, POWDER, LYOPHILIZED, FOR SOLUTION INTRAMUSCULAR; INTRAVENOUS ONCE
Status: COMPLETED | OUTPATIENT
Start: 2021-10-17 | End: 2021-10-17

## 2021-10-17 RX ORDER — POLYETHYLENE GLYCOL 3350 17 G/17G
17 POWDER, FOR SOLUTION ORAL DAILY PRN
Status: DISCONTINUED | OUTPATIENT
Start: 2021-10-17 | End: 2021-10-19 | Stop reason: HOSPADM

## 2021-10-17 RX ORDER — ALPRAZOLAM 0.25 MG/1
0.5 TABLET ORAL 2 TIMES DAILY PRN
Status: DISCONTINUED | OUTPATIENT
Start: 2021-10-17 | End: 2021-10-19 | Stop reason: HOSPADM

## 2021-10-17 RX ORDER — IPRATROPIUM BROMIDE AND ALBUTEROL SULFATE 2.5; .5 MG/3ML; MG/3ML
3 SOLUTION RESPIRATORY (INHALATION) ONCE
Status: COMPLETED | OUTPATIENT
Start: 2021-10-17 | End: 2021-10-17

## 2021-10-17 RX ORDER — ONDANSETRON 4 MG/1
4 TABLET, ORALLY DISINTEGRATING ORAL EVERY 8 HOURS PRN
Status: DISCONTINUED | OUTPATIENT
Start: 2021-10-17 | End: 2021-10-19 | Stop reason: HOSPADM

## 2021-10-17 RX ORDER — SODIUM CHLORIDE 9 MG/ML
25 INJECTION, SOLUTION INTRAVENOUS PRN
Status: DISCONTINUED | OUTPATIENT
Start: 2021-10-17 | End: 2021-10-19 | Stop reason: HOSPADM

## 2021-10-17 RX ORDER — ONDANSETRON 2 MG/ML
4 INJECTION INTRAMUSCULAR; INTRAVENOUS EVERY 6 HOURS PRN
Status: DISCONTINUED | OUTPATIENT
Start: 2021-10-17 | End: 2021-10-19 | Stop reason: HOSPADM

## 2021-10-17 RX ADMIN — ENOXAPARIN SODIUM 40 MG: 40 INJECTION SUBCUTANEOUS at 18:18

## 2021-10-17 RX ADMIN — METHYLPREDNISOLONE SODIUM SUCCINATE 125 MG: 125 INJECTION, POWDER, FOR SOLUTION INTRAMUSCULAR; INTRAVENOUS at 10:22

## 2021-10-17 RX ADMIN — METOPROLOL SUCCINATE 25 MG: 25 TABLET, FILM COATED, EXTENDED RELEASE ORAL at 18:18

## 2021-10-17 RX ADMIN — IOPAMIDOL 75 ML: 755 INJECTION, SOLUTION INTRAVENOUS at 11:48

## 2021-10-17 RX ADMIN — ASPIRIN 81 MG CHEWABLE TABLET 81 MG: 81 TABLET CHEWABLE at 18:18

## 2021-10-17 RX ADMIN — ARFORMOTEROL TARTRATE 15 MCG: 15 SOLUTION RESPIRATORY (INHALATION) at 19:44

## 2021-10-17 RX ADMIN — Medication 10 ML: at 21:55

## 2021-10-17 RX ADMIN — HYDROCHLOROTHIAZIDE 25 MG: 25 TABLET ORAL at 18:18

## 2021-10-17 RX ADMIN — IPRATROPIUM BROMIDE AND ALBUTEROL SULFATE 3 AMPULE: .5; 2.5 SOLUTION RESPIRATORY (INHALATION) at 10:22

## 2021-10-17 RX ADMIN — LEVOFLOXACIN 750 MG: 5 INJECTION, SOLUTION INTRAVENOUS at 13:16

## 2021-10-17 RX ADMIN — BUDESONIDE 500 MCG: 0.5 SUSPENSION RESPIRATORY (INHALATION) at 19:44

## 2021-10-17 RX ADMIN — IPRATROPIUM BROMIDE AND ALBUTEROL SULFATE 1 AMPULE: .5; 2.5 SOLUTION RESPIRATORY (INHALATION) at 19:44

## 2021-10-17 ASSESSMENT — PAIN SCALES - GENERAL
PAINLEVEL_OUTOF10: 0
PAINLEVEL_OUTOF10: 0

## 2021-10-17 NOTE — ED NOTES
Report to [facility] on [phone]. Awaiting transport.       Lisa Louie RN  10/17/21 5118

## 2021-10-17 NOTE — ED NOTES
Spoke with [facility]. States ETA another 45 mins. Aware that patient is decompensating and needs transported ASAP.    Kaitlynn Hendricks, RN  10/17/21 [address] [place] [address], RN  10/17/21 9105

## 2021-10-17 NOTE — PROGRESS NOTES
Dr Alise Archibald re: New cx arriving on Bipap placed on 16/8 fi02 90%. Paper ABG copy: pH 7.27 C02 74.5 p02 56 HC03 34.2. Clarifying settings/? Repeat ABG. RT notified of change to AVAPS and settings.

## 2021-10-17 NOTE — ED NOTES
Increase noted to confusion, patient difficult to arouse. Physician at bedside. Bipap initiated. Patient tolerating well. Second set [facility] obtained. IV antibiotics initiated.       Shannon Dalton RN  10/17/21 [address] Lucrecia Cho RN  10/17/21 5842

## 2021-10-17 NOTE — ED PROVIDER NOTES
has been smoking cigarettes. He has been smoking about 2.00 packs per day. He has never used smokeless tobacco. He reports previous alcohol use. He reports that he does not use drugs. Family History: family history is not on file. . Unless otherwise noted, family history is non contributory    The patients home medications have been reviewed. Allergies: Patient has no known allergies. ---------------------------------------------------PHYSICAL EXAM--------------------------------------    Constitutional/General: Alert and oriented x3  Head: Normocephalic and atraumatic  Eyes: PERRL, EOMI, sclera non icteric  Mouth: Oropharynx clear, handling secretions, no trismus, no asymmetry of the posterior oropharynx or uvular edema  Neck: Supple, full ROM, no stridor, no meningeal signs  Respiratory: Inspiratory and expiratory wheeze diffusely, retractions noted  Cardiovascular:  Regular rate. Regular rhythm. 2+ distal pulses. Equal extremity pulses. Chest: No chest wall tenderness  GI:  Abdomen Soft, Non tender, Non distended. No rebound, guarding, or rigidity. Musculoskeletal: Moves all extremities x 4. Warm and well perfused, no clubbing, cyanosis. 3+ edema. Capillary refill <3 seconds  Integument: skin warm and dry. No rashes. Neurologic: GCS 15, no focal deficits, symmetric strength 5/5 in the upper and lower extremities bilaterally  Psychiatric: Normal Affect      EKG: Interpreted by emergency department physician, Dr. Laura Gonzalez    This EKG is signed and interpreted by me. Rate: 108  Rhythm: Sinus  Interpretation: non-specific EKG  Comparison: changes compared to previous EKG      -------------------------------------------------- RESULTS -------------------------------------------------  I have personally reviewed all laboratory and imaging results for this patient. Results are listed below.      LABS: (Lab results interpreted by me)  Results for orders placed or performed during the hospital encounter of 10/17/21   COVID-19, Rapid    Specimen: Nasopharyngeal Swab   Result Value Ref Range    SARS-CoV-2, NAAT Not Detected Not Detected   Culture, Blood 1    Specimen: Blood   Result Value Ref Range    Blood Culture, Routine 5 Days no growth    Culture, Blood 2    Specimen: Blood   Result Value Ref Range    Culture, Blood 2 5 Days no growth    CBC Auto Differential   Result Value Ref Range    WBC 9.4 4.5 - 11.5 E9/L    RBC 6.18 (H) 3.80 - 5.80 E12/L    Hemoglobin 17.8 (H) 12.5 - 16.5 g/dL    Hematocrit 55.9 (H) 37.0 - 54.0 %    MCV 90.5 80.0 - 99.9 fL    MCH 28.8 26.0 - 35.0 pg    MCHC 31.8 (L) 32.0 - 34.5 %    RDW 14.3 11.5 - 15.0 fL    Platelets 654 369 - 561 E9/L    MPV 10.6 7.0 - 12.0 fL    Neutrophils % 80.4 (H) 43.0 - 80.0 %    Immature Granulocytes % 0.4 0.0 - 5.0 %    Lymphocytes % 11.4 (L) 20.0 - 42.0 %    Monocytes % 7.3 2.0 - 12.0 %    Eosinophils % 0.1 0.0 - 6.0 %    Basophils % 0.4 0.0 - 2.0 %    Neutrophils Absolute 7.51 (H) 1.80 - 7.30 E9/L    Immature Granulocytes # 0.04 E9/L    Lymphocytes Absolute 1.07 (L) 1.50 - 4.00 E9/L    Monocytes Absolute 0.68 0.10 - 0.95 E9/L    Eosinophils Absolute 0.01 (L) 0.05 - 0.50 E9/L    Basophils Absolute 0.04 0.00 - 0.20 E9/L   Comprehensive Metabolic Panel w/ Reflex to MG   Result Value Ref Range    Sodium 132 132 - 146 mmol/L    Potassium reflex Magnesium 4.6 3.5 - 5.0 mmol/L    Chloride 90 (L) 98 - 107 mmol/L    CO2 30 (H) 22 - 29 mmol/L    Anion Gap 12 7 - 16 mmol/L    Glucose 160 (H) 74 - 99 mg/dL    BUN 27 (H) 6 - 23 mg/dL    CREATININE 0.6 (L) 0.7 - 1.2 mg/dL    GFR Non-African American >60 >=60 mL/min/1.73    GFR African American >60     Calcium 9.3 8.6 - 10.2 mg/dL    Total Protein 6.6 6.4 - 8.3 g/dL    Albumin 3.9 3.5 - 5.2 g/dL    Total Bilirubin 1.2 0.0 - 1.2 mg/dL    Alkaline Phosphatase 160 (H) 40 - 129 U/L    ALT 17 0 - 40 U/L    AST 27 0 - 39 U/L   Lipase   Result Value Ref Range    Lipase 12 (L) 13 - 60 U/L   Troponin   Result Value Ref Range    Troponin, High Sensitivity 13 (H) 0 - 11 ng/L   Brain Natriuretic Peptide   Result Value Ref Range    Pro-BNP 3,782 (H) 0 - 125 pg/mL   Lactic Acid, Plasma   Result Value Ref Range    Lactic Acid 2.2 0.5 - 2.2 mmol/L   POCT blood gases   Result Value Ref Range    Date Analyzed 10/17/2021. Time Analyzed 1,603     Sample Type Arterial     POC pH 7.270 (L) 7.350 - 7.450    POC pCO2 74.5 (HH) 35.0 - 45.0 mmHg    POC PO2 56.0 (L) 60.0 - 80.0 mmHg    POC HCO3 34.2 (H) 22.0 - 26.0 mmol/L    POC Base Excess 3.9 (H) -3.0 - 3.0 mmol/L    POC O2 SAT 82.7 (L) 92.0 - 98.5 %    POC Comment L Radial     POC Mode CPAP/PS    EKG 12 Lead   Result Value Ref Range    Ventricular Rate 108 BPM    Atrial Rate 108 BPM    P-R Interval 128 ms    QRS Duration 100 ms    Q-T Interval 340 ms    QTc Calculation (Bazett) 455 ms    P Axis -25 degrees    R Axis 152 degrees    T Axis -13 degrees   ,       RADIOLOGY:  Interpreted by Radiologist unless otherwise specified  CTA CHEST W CONTRAST   Final Result   No evidence of pulmonary embolism or acute pulmonary abnormality. Right pleural effusion      Pulmonary emphysema/COPD      Small area of ground-glass opacity in the right lower lobe may reflect   atelectasis versus infiltrate      Findings suggestive of pulmonary hypertension         XR CHEST PORTABLE   Final Result   Increased opacity within the right lower lobe likely reflecting pulmonary   infiltrate and or effusion                          ------------------------- NURSING NOTES AND VITALS REVIEWED ---------------------------   The nursing notes within the ED encounter and vital signs as below have been reviewed by myself  BP 99/64   Pulse 81   Temp 98.1 °F (36.7 °C) (Temporal)   Resp 18   Ht 5' 11\" (1.803 m)   Wt 155 lb (70.3 kg)   SpO2 93%   BMI 21.62 kg/m²     Oxygen Saturation Interpretation: Improved after treatment    The cardiac monitor revealed sinus rhythm with a heart rate in the 100s as interpreted by me. The cardiac monitor was ordered secondary to the patient's heart rate and to monitor the patient for dysrhythmia. CPT 80906    The patients available past medical records and past encounters were reviewed. ------------------------------ ED COURSE/MEDICAL DECISION MAKING----------------------  Medications   ipratropium-albuterol (DUONEB) nebulizer solution 3 ampule (3 ampules Inhalation Given 10/17/21 1022)   methylPREDNISolone sodium (SOLU-MEDROL) injection 125 mg (125 mg IntraVENous Given 10/17/21 1022)   iopamidol (ISOVUE-370) 76 % injection 75 mL (75 mLs IntraVENous Given 10/17/21 1148)   levoFLOXacin (LEVAQUIN) 750 MG/150ML infusion 750 mg (0 mg IntraVENous Stopped 10/17/21 1458)   furosemide (LASIX) injection 40 mg (40 mg IntraVENous Given 10/18/21 1538)                    Medical Decision Making:     I, Dr. Sheyla Corbett am the primary provider of record    Patient presents with increased work of breathing and inspiratory and expiratory wheeze diffusely. He was given steroids and breathing treatments immediately with some improvement on reevaluation. I have a high concern for possible pulmonary embolism. CT angiogram obtained which shows no PE but displayed emphysematous changes, right pleural effusion, and right lower lobe infiltrate. Patient was covered with antibiotics for community-acquired pneumonia. His EKG showed nonspecific changes compared to prior but I believe this is likely due to increased work of breathing as opposed to primary cardiac etiology for patient's symptoms. The patient's high-sensitivity troponin is reassuring. The patient became more somnolent while in the emergency department and I assume he is retaining CO2. BiPAP applied. Patient remained hemodynamically stable though somnolent. He was protecting his airway and maintaining on BiPAP. I believe the patient would benefit from admission for further evaluation and management.   Discussed patient care with admitting team and transfer arranged. Patient remained in the emergency department at the end of my shift. Re-Evaluations:       Improved on reevaluation. This patient's ED course included: a personal history and physicial examination, re-evaluation prior to disposition, multiple bedside re-evaluations, IV medications, cardiac monitoring, continuous pulse oximetry and complex medical decision making and emergency management    This patient has remained hemodynamically stable during their ED course. Critical Care: 31 minutes       Counseling: The emergency provider has spoken with the patient and spouse/SO and discussed todays results, in addition to providing specific details for the plan of care and counseling regarding the diagnosis and prognosis. Questions are answered at this time and they are agreeable with the plan.       --------------------------------- IMPRESSION AND DISPOSITION ---------------------------------    IMPRESSION  1. Acute respiratory failure with hypoxia ([address]    2. Pneumonia of right lung due to infectious organism, unspecified part of lung        DISPOSITION  Disposition: Admit to telemetry  Patient condition is stable        NOTE: This report was transcribed using voice recognition software.  Every effort was made to ensure accuracy; however, inadvertent computerized transcription errors may be present        Renuka Stewart MD  10/23/21 5333

## 2021-10-17 NOTE — ED NOTES
Dr. Jessica Lagos updated on patients status, confusion, and drowsiness. States OK to send patient to PCCU. No additional orders given.       Jany Bonilla RN  10/17/21 9835

## 2021-10-17 NOTE — ED NOTES
Patient drowsy, easily aroused. Confused. Wife remains at bedside. Updated on wait for bed assignment.       Mariaa Patel RN  10/17/21 2425

## 2021-10-17 NOTE — ED NOTES
Dr. Anne Pichardo aware of patient status. ABGs obtained per order.       Lisa Louie RN  10/17/21 2605

## 2021-10-17 NOTE — ED NOTES
[facility] aware of bed assignment. ETA 90 minutes. PAS aware that patient is on the bipap and will be needed for transport.     Darrel Lowe RN  10/17/21 124 Nadir Evans RN  10/17/21 9504

## 2021-10-17 NOTE — PLAN OF CARE
Problem: Skin Integrity:  Goal: Will show no infection signs and symptoms  Description: Will show no infection signs and symptoms  Outcome: Ongoing     Problem: Falls - Risk of:  Goal: Absence of physical injury  Description: Absence of physical injury  Outcome: Ongoing     Problem: Breathing Pattern - Ineffective:  Goal: Ability to achieve and maintain a regular respiratory rate will improve  Description: Ability to achieve and maintain a regular respiratory rate will improve  Outcome: Ongoing

## 2021-10-17 NOTE — ED NOTES
Treatment compete. States breathing easier. Decrease noted in wheezing. Continues with dyspnea, tachypnea, and accessory muscle use.       Jany Bonilla RN  10/17/21 9276

## 2021-10-18 LAB
DATE ANALYZED: ABNORMAL
EKG ATRIAL RATE: 108 BPM
EKG P AXIS: -25 DEGREES
EKG P-R INTERVAL: 128 MS
EKG Q-T INTERVAL: 340 MS
EKG QRS DURATION: 100 MS
EKG QTC CALCULATION (BAZETT): 455 MS
EKG R AXIS: 152 DEGREES
EKG T AXIS: -13 DEGREES
EKG VENTRICULAR RATE: 108 BPM
POC BASE EXCESS: 3.9 MMOL/L (ref -3–3)
POC COMMENT: ABNORMAL
POC HCO3: 34.2 MMOL/L (ref 22–26)
POC MODE: ABNORMAL
POC O2 SATURATION: 82.7 % (ref 92–98.5)
POC PCO2: 74.5 MMHG (ref 35–45)
POC PH: 7.27 (ref 7.35–7.45)
POC PO2: 56 MMHG (ref 60–80)
POC SAMPLE TYPE: ABNORMAL
TIME ANALYZED: 1603

## 2021-10-18 PROCEDURE — 6360000002 HC RX W HCPCS: Performed by: INTERNAL MEDICINE

## 2021-10-18 PROCEDURE — 6370000000 HC RX 637 (ALT 250 FOR IP): Performed by: INTERNAL MEDICINE

## 2021-10-18 PROCEDURE — 2140000000 HC CCU INTERMEDIATE R&B

## 2021-10-18 PROCEDURE — 94640 AIRWAY INHALATION TREATMENT: CPT

## 2021-10-18 PROCEDURE — 94660 CPAP INITIATION&MGMT: CPT

## 2021-10-18 PROCEDURE — 93010 ELECTROCARDIOGRAM REPORT: CPT | Performed by: INTERNAL MEDICINE

## 2021-10-18 PROCEDURE — 2580000003 HC RX 258: Performed by: INTERNAL MEDICINE

## 2021-10-18 RX ORDER — HYDROCODONE BITARTRATE AND ACETAMINOPHEN 10; 325 MG/1; MG/1
1 TABLET ORAL EVERY 6 HOURS PRN
Status: DISCONTINUED | OUTPATIENT
Start: 2021-10-18 | End: 2021-10-19 | Stop reason: HOSPADM

## 2021-10-18 RX ORDER — METHYLPREDNISOLONE SODIUM SUCCINATE 125 MG/2ML
40 INJECTION, POWDER, LYOPHILIZED, FOR SOLUTION INTRAMUSCULAR; INTRAVENOUS EVERY 8 HOURS
Status: DISCONTINUED | OUTPATIENT
Start: 2021-10-18 | End: 2021-10-19

## 2021-10-18 RX ORDER — FUROSEMIDE 10 MG/ML
40 INJECTION INTRAMUSCULAR; INTRAVENOUS ONCE
Status: COMPLETED | OUTPATIENT
Start: 2021-10-18 | End: 2021-10-18

## 2021-10-18 RX ORDER — NICOTINE 21 MG/24HR
1 PATCH, TRANSDERMAL 24 HOURS TRANSDERMAL DAILY
Status: DISCONTINUED | OUTPATIENT
Start: 2021-10-18 | End: 2021-10-19 | Stop reason: HOSPADM

## 2021-10-18 RX ADMIN — IPRATROPIUM BROMIDE AND ALBUTEROL SULFATE 1 AMPULE: .5; 2.5 SOLUTION RESPIRATORY (INHALATION) at 19:46

## 2021-10-18 RX ADMIN — LEVOFLOXACIN 750 MG: 5 INJECTION, SOLUTION INTRAVENOUS at 13:30

## 2021-10-18 RX ADMIN — METHYLPREDNISOLONE SODIUM SUCCINATE 40 MG: 125 INJECTION, POWDER, FOR SOLUTION INTRAMUSCULAR; INTRAVENOUS at 23:01

## 2021-10-18 RX ADMIN — IPRATROPIUM BROMIDE AND ALBUTEROL SULFATE 1 AMPULE: .5; 2.5 SOLUTION RESPIRATORY (INHALATION) at 08:55

## 2021-10-18 RX ADMIN — SERTRALINE 150 MG: 100 TABLET, FILM COATED ORAL at 08:45

## 2021-10-18 RX ADMIN — ENOXAPARIN SODIUM 40 MG: 40 INJECTION SUBCUTANEOUS at 08:49

## 2021-10-18 RX ADMIN — Medication 10 ML: at 08:49

## 2021-10-18 RX ADMIN — Medication 10 ML: at 20:45

## 2021-10-18 RX ADMIN — METOPROLOL SUCCINATE 25 MG: 25 TABLET, FILM COATED, EXTENDED RELEASE ORAL at 08:45

## 2021-10-18 RX ADMIN — IPRATROPIUM BROMIDE AND ALBUTEROL SULFATE 1 AMPULE: .5; 2.5 SOLUTION RESPIRATORY (INHALATION) at 12:37

## 2021-10-18 RX ADMIN — IPRATROPIUM BROMIDE AND ALBUTEROL SULFATE 1 AMPULE: .5; 2.5 SOLUTION RESPIRATORY (INHALATION) at 15:44

## 2021-10-18 RX ADMIN — ARFORMOTEROL TARTRATE 15 MCG: 15 SOLUTION RESPIRATORY (INHALATION) at 08:55

## 2021-10-18 RX ADMIN — HYDROCODONE BITARTRATE AND ACETAMINOPHEN 1 TABLET: 10; 325 TABLET ORAL at 11:45

## 2021-10-18 RX ADMIN — ASPIRIN 81 MG CHEWABLE TABLET 81 MG: 81 TABLET CHEWABLE at 08:45

## 2021-10-18 RX ADMIN — ARFORMOTEROL TARTRATE 15 MCG: 15 SOLUTION RESPIRATORY (INHALATION) at 19:46

## 2021-10-18 RX ADMIN — HYDROCHLOROTHIAZIDE 25 MG: 25 TABLET ORAL at 08:45

## 2021-10-18 RX ADMIN — HYDROCODONE BITARTRATE AND ACETAMINOPHEN 1 TABLET: 10; 325 TABLET ORAL at 18:15

## 2021-10-18 RX ADMIN — METHYLPREDNISOLONE SODIUM SUCCINATE 40 MG: 125 INJECTION, POWDER, FOR SOLUTION INTRAMUSCULAR; INTRAVENOUS at 15:39

## 2021-10-18 RX ADMIN — BUDESONIDE 500 MCG: 0.5 SUSPENSION RESPIRATORY (INHALATION) at 08:55

## 2021-10-18 RX ADMIN — ALPRAZOLAM 0.5 MG: 0.25 TABLET ORAL at 23:01

## 2021-10-18 RX ADMIN — FUROSEMIDE 40 MG: 10 INJECTION, SOLUTION INTRAMUSCULAR; INTRAVENOUS at 15:38

## 2021-10-18 RX ADMIN — ALPRAZOLAM 0.5 MG: 0.25 TABLET ORAL at 15:38

## 2021-10-18 RX ADMIN — BUDESONIDE 500 MCG: 0.5 SUSPENSION RESPIRATORY (INHALATION) at 19:46

## 2021-10-18 RX ADMIN — LISINOPRIL 2.5 MG: 5 TABLET ORAL at 08:46

## 2021-10-18 ASSESSMENT — PAIN SCALES - GENERAL
PAINLEVEL_OUTOF10: 8
PAINLEVEL_OUTOF10: 0
PAINLEVEL_OUTOF10: 0
PAINLEVEL_OUTOF10: 7

## 2021-10-18 ASSESSMENT — ENCOUNTER SYMPTOMS
ABDOMINAL PAIN: 0
SHORTNESS OF BREATH: 1
WHEEZING: 1
VOMITING: 0
EYES NEGATIVE: 1
NAUSEA: 0
COLOR CHANGE: 0

## 2021-10-18 NOTE — CONSULTS
Yfn Melara M.D. Mary Alvarez M.D. Georgina Gordon M.D. Silvia Prince D.O. Patient:  Jose Cox 61 y.o. male MRN: [id number]     Date of Service: 10/18/2021      PULMONARY CONSULTATION    Reason for Consultation: acute resp failure  Referring Physician: Lisa Orellana MD    Chief Complaint   Patient presents with    Shortness of Breath     oxygen levels at home in the 79s last night.  today 63%. increased SOB         Code Status: Full Code        SUBJECTIVE:    HPI:  This is a 82-year-old male with Hx COPD, tobacco use, HTN, lymphedema of lower extremities that presents with worsening shortness of breath and hypoxia. Spouse at bedside able to give further history along with patient. Had been noticing increased shortness of breath over the past week. Would run out of breath with just walking across the room. Wife noticed increased wheezing. Denies any cough. Denies any chest tightness. Has noticed that his lower extremities as started to swell again. Previously was treated for lymphedema earlier in the year which did improve. Denies any fevers, chills nausea vomiting diarrhea. Just feels weak and fatigued shortness of breath. Shortness of breath is worse with exertion. Spouse reports that she had a pulse ox at home and noticed that it was low. When he came to the emergency department patient was found to be hypoxic and hypercapnic and was placed on BiPAP. Work-up in emergency department included a CT angiogram which showed emphysema and right pleural effusion. Currently is on 3 L O2. Reports ongoing tobacco use. Reports having evaluation earlier in the year for lower extremity edema from lymphedema. Is on inhaler at home with Advair.     Past Medical History:   Diagnosis Date    CAD (coronary artery disease)     Dr. Jolly Bashir yearly     COPD (chronic obstructive pulmonary disease) ([facility] [address]     controlled with inhalers     Decreased dorsalis pedis pulse 3/11/2021    Depression     Enlarged prostate without lower urinary tract symptoms (luts)     Gallstone     for OR 10-7-19     Hypertension     Leg swelling     Lymphedema of both lower extremities 3/11/2021    Vitamin D deficiency      Past Surgical History:   Procedure Laterality Date    CARDIAC SURGERY      heart cath w/ stent 2012     CHOLECYSTECTOMY, LAPAROSCOPIC N/A 9/8/2019    CHOLECYSTECTOMY LAPAROSCOPIC, INTRA OPERATIVE CHOLANGIOGRAM performed by Linda Harris MD at [facility] ERCP N/A 10/7/2019    ERCP STENT INSERTION performed by Dennie Salen, MD at [address] [facility] ERCP N/A 10/9/2019    ERCP STONE REMOVAL performed by Dennie Salen, MD at [address] [facility]      2006 - inguinal left w/ mesh      History reviewed. No pertinent family history. Social History:   Social History     Socioeconomic History    Marital status:      Spouse name: Not on file    Number of children: Not on file    Years of education: Not on file    Highest education level: Not on file   Occupational History    Not on file   Tobacco Use    Smoking status: Current Every Day Smoker     Packs/day: 2.00     Types: Cigarettes    Smokeless tobacco: Never Used   Vaping Use    Vaping Use: Never used   Substance and Sexual Activity    Alcohol use: Not Currently    Drug use: Never    Sexual activity: Not on file   Other Topics Concern    Not on file   Social History Narrative    Not on file     Social Determinants of Health     Financial Resource Strain:     Difficulty of Paying Living Expenses:    Food Insecurity:     Worried About Running Out of Food in the Last Year:     [address] in the Last Year:    Transportation Needs:     Lack of Transportation (Medical):      Lack of Transportation (Non-Medical):    Physical Activity:     Days of Exercise per Week:     Minutes of Exercise per Session:    Stress:     Feeling of Stress :    Social Connections:  Frequency of Communication with Friends and Family:     Frequency of Social Gatherings with Friends and Family:     Attends Rastafarian Services:     Active Member of Clubs or Organizations:     Attends Club or Organization Meetings:     Marital Status:    Intimate Partner Violence:     Fear of Current or Ex-Partner:     Emotionally Abused:     Physically Abused:     Sexually Abused:        Social Hx: Smokes 1.5 to 2 packs/day. Retired and worked as a . Vaccines: There is no immunization history on file for this patient. Home Meds: Medications Prior to Admission: fluticasone-salmeterol (ADVAIR DISKUS) 250-50 MCG/DOSE AEPB, Inhale 1 puff into the lungs every 12 hours  HYDROcodone-acetaminophen (NORCO)  MG per tablet, Take 1 tablet by mouth every 6 hours as needed for Pain for up to 30 days. meloxicam (MOBIC) 15 MG tablet, Take one tablet daily  ALPRAZolam (XANAX) 0.5 MG tablet, Take 1 tablet by mouth 2 times daily as needed for Sleep or Anxiety for up to 30 days. nitroGLYCERIN (NITROSTAT) 0.4 MG SL tablet, Place 1 tablet under the tongue every 5 minutes as needed for Chest pain up to max of 3 total doses.  If no relief after 1 dose, call 911.  hydroCHLOROthiazide (HYDRODIURIL) 25 MG tablet, TAKE 1 TABLET BY MOUTH EVERY DAY IN THE MORNING  sertraline (ZOLOFT) 100 MG tablet, TAKE ONE AND ONE-HALF TABLETS DAILY  aspirin 81 MG tablet, Take 1 tablet by mouth daily DO NOT TAKE ASPIRIN FOR 1 WEEK  lisinopril (PRINIVIL;ZESTRIL) 2.5 MG tablet, Take 1 tablet by mouth daily  metoprolol succinate (TOPROL XL) 25 MG extended release tablet, Take 1 tablet by mouth daily (Patient taking differently: Take 25 mg by mouth daily Instructed to take am of procedure)  sildenafil (VIAGRA) 100 MG tablet, Take 100 mg by mouth as needed for Erectile Dysfunction Instructed to not use 24 hours pre-op    CURRENT MEDS :  Scheduled Meds:   aspirin  81 mg Oral Daily    hydroCHLOROthiazide  25 mg Oral Daily    lisinopril  2.5 mg Oral Daily    metoprolol succinate  25 mg Oral Daily    sertraline  150 mg Oral Daily    sodium chloride flush  5-40 mL IntraVENous 2 times per day    enoxaparin  40 mg SubCUTAneous Daily    ipratropium-albuterol  1 ampule Inhalation Q4H WA    levofloxacin  750 mg IntraVENous Q24H    budesonide  0.5 mg Nebulization BID    And    Arformoterol Tartrate  15 mcg Nebulization BID       Continuous Infusions:   sodium chloride         No Known Allergies    REVIEW OF SYSTEMS:  REVIEW OF SYMPTOMS:  Review of Systems   Constitutional: Positive for fatigue. Negative for chills and fever. HENT: Negative. Eyes: Negative. Respiratory: Positive for shortness of breath and wheezing. Cardiovascular: Positive for leg swelling. Negative for chest pain. Gastrointestinal: Negative for abdominal pain, nausea and vomiting. Endocrine: Negative for cold intolerance and heat intolerance. Genitourinary: Negative for difficulty urinating and dysuria. Musculoskeletal: Negative. Skin: Negative for color change and rash. Allergic/Immunologic: Negative for environmental allergies and immunocompromised state. Neurological: Negative for dizziness and weakness. Psychiatric/Behavioral: Negative for agitation and confusion.           OBJECTIVE:   BP (!) 150/78   Pulse 72   Temp 98.1 °F (36.7 °C) (Temporal)   Resp 20   Ht 5' 11\" (1.803 m)   Wt 155 lb (70.3 kg)   SpO2 92%   BMI 21.62 kg/m²   SpO2 Readings from Last 1 Encounters:   10/18/21 92%        I/O:    Intake/Output Summary (Last 24 hours) at 10/18/2021 1327  Last data filed at 10/17/2021 2220  Gross per 24 hour   Intake 0 ml   Output 0 ml   Net 0 ml     Vent Information  Skin Assessment: Clean, dry, & intact  Vt Ordered: 450 mL  FiO2 : 90 %  SpO2: 92 %  I Time/ I Time %: 0.8 s  Mask Type: Full face mask  Mask Size: Medium          CPAP/EPAP: 10 cmH2O     PHYSICAL EXAM:  Physical Exam  Constitutional:       General: He is  Continue supplemental O2 to maintain SPO2 between 88-92%, check ambulatory pulse ox. Patient may need home O2.  BiLevel prn   Start Solu-Medrol 40 mg every 8 hour, taper as tolerated. Possible transition to prednisone in next 1-2 days   Continue aerosol bronchodilators with DuoNeb q4wa, Brovana/Pulmicort as inpatient. Would benefit from triple bronchodilator ICS/LAMA/LABA for D/C. Will arrange nebulizer with Duo-neb.  Give dose of diuretic lasix 40 mg x 1   Nicotine patch, discussed smoking cessation   Monitor off abx   Outpatient PFT   GI/DVT - Lovenox 40 mg    Spouse updated at [facility]. Will need outpatient f/u. Messaged routed to office for f/u. Thank you for allowing me to participate in the care of Pam Silva. Please feel free to call with questions.        Electronically signed by Kathleen Bonilla DO on 10/18/2021 at 1:27 PM

## 2021-10-18 NOTE — H&P
[facility]  History and Physical      CHIEF COMPLAINT: As of breath    Reason for Admission: Acute hypoxic respiratory failure, acute exacerbation of COPD, right pleural effusion    History Obtained From: Patient, wife    PCP :  Nancy Orellana MD    [address] [facility] [id number]      HISTORY OF PRESENT ILLNESS:      The patient is a 61 y.o. male presented to the emergency with shortness of breath. He has been having shortness of breath for the last few days. Since [facility] he has been smoking more usually 2 to 3 packs/day. He denies any fevers or chills. Denies any purulence. He did have significant recent weight loss. Patient also has been having bilateral leg edema. He was told that he had lymphedema. ED evaluation revealed significant hypoxia, right pleural effusion. He was then admitted for further evaluation and treatment.     Past Medical History:        Diagnosis Date    CAD (coronary artery disease)     Dr. Isha Call yearly     COPD (chronic obstructive pulmonary disease) ([address]     controlled with inhalers     Decreased dorsalis pedis pulse 3/11/2021    Depression     Enlarged prostate without lower urinary tract symptoms (luts)     Gallstone     for OR 10-7-19     Hypertension     Leg swelling     Lymphedema of both lower extremities 3/11/2021    Vitamin D deficiency      Past Surgical History:        Procedure Laterality Date    CARDIAC SURGERY      heart cath w/ stent 2012     CHOLECYSTECTOMY, LAPAROSCOPIC N/A 9/8/2019    CHOLECYSTECTOMY LAPAROSCOPIC, INTRA OPERATIVE CHOLANGIOGRAM performed by Jim Hu MD at [address] [facility] ERCP N/A 10/7/2019    ERCP STENT INSERTION performed by Veena Potter MD at [address] ERCP N/A 10/9/2019    ERCP STONE REMOVAL performed by Veena Potter MD at [address] [facility]      2006 - inguinal left w/ mesh          Medications Prior to Admission:    Medications Prior to Admission: fluticasone-salmeterol (ADVAIR DISKUS) 250-50 MCG/DOSE AEPB, Inhale 1 puff into the lungs every 12 hours  HYDROcodone-acetaminophen (NORCO)  MG per tablet, Take 1 tablet by mouth every 6 hours as needed for Pain for up to 30 days. meloxicam (MOBIC) 15 MG tablet, Take one tablet daily  ALPRAZolam (XANAX) 0.5 MG tablet, Take 1 tablet by mouth 2 times daily as needed for Sleep or Anxiety for up to 30 days. nitroGLYCERIN (NITROSTAT) 0.4 MG SL tablet, Place 1 tablet under the tongue every 5 minutes as needed for Chest pain up to max of 3 total doses. If no relief after 1 dose, call 911.  hydroCHLOROthiazide (HYDRODIURIL) 25 MG tablet, TAKE 1 TABLET BY MOUTH EVERY DAY IN THE MORNING  sertraline (ZOLOFT) 100 MG tablet, TAKE ONE AND ONE-HALF TABLETS DAILY  aspirin 81 MG tablet, Take 1 tablet by mouth daily DO NOT TAKE ASPIRIN FOR 1 WEEK  lisinopril (PRINIVIL;ZESTRIL) 2.5 MG tablet, Take 1 tablet by mouth daily  metoprolol succinate (TOPROL XL) 25 MG extended release tablet, Take 1 tablet by mouth daily (Patient taking differently: Take 25 mg by mouth daily Instructed to take am of procedure)  sildenafil (VIAGRA) 100 MG tablet, Take 100 mg by mouth as needed for Erectile Dysfunction Instructed to not use 24 hours pre-op    Allergies:  Patient has no known allergies.     Social History:   Social History     Socioeconomic History    Marital status:      Spouse name: Not on file    Number of children: Not on file    Years of education: Not on file    Highest education level: Not on file   Occupational History    Not on file   Tobacco Use    Smoking status: Current Every Day Smoker     Packs/day: 2.00     Types: Cigarettes    Smokeless tobacco: Never Used   Vaping Use    Vaping Use: Never used   Substance and Sexual Activity    Alcohol use: Not Currently    Drug use: Never    Sexual activity: Not on file   Other Topics Concern    Not on file   Social History Narrative    Not on file     Social Determinants of Health     Financial Resource Strain:     Difficulty of Paying Living Expenses:    Food Insecurity:     Worried About Running Out of Food in the Last Year:     [address] in the Last Year:    Transportation Needs:     Lack of Transportation (Medical):  Lack of Transportation (Non-Medical):    Physical Activity:     Days of Exercise per Week:     Minutes of Exercise per Session:    Stress:     Feeling of Stress :    Social Connections:     Frequency of Communication with Friends and Family:     Frequency of Social Gatherings with Friends and Family:     Attends Pentecostalism Services:     Active Member of Clubs or Organizations:     Attends Club or Organization Meetings:     Marital Status:    Intimate Partner Violence:     Fear of Current or Ex-Partner:     Emotionally Abused:     Physically Abused:     Sexually Abused:          Family History:   History reviewed. No pertinent family history. REVIEW OF SYSTEMS:    General ROS: Positive for weight loss  Hematological and Lymphatic ROS: negative  Endocrine ROS: negative  Respiratory ROS: Positive for shortness of breath  Cardiovascular ROS: no chest pain or dyspnea on exertion  Gastrointestinal ROS: no abdominal pain, change in bowel habits, or black or bloody stools  Genito-Urinary ROS: no dysuria, trouble voiding, or hematuria  Neurological ROS: no TIA or stroke symptoms  negative    Vitals:  BP (!) 150/78   Pulse 72   Temp 98.1 °F (36.7 °C) (Temporal)   Resp 20   Ht 5' 11\" (1.803 m)   Wt 155 lb (70.3 kg)   SpO2 92%   BMI 21.62 kg/m²     PHYSICAL EXAM:  General:  Awake, alert, oriented X 3. Well developed, well nourished, well groomed. No apparent distress. HEENT:  Normocephalic, atraumatic. Pupils equal, round, reactive to light. No scleral icterus. No conjunctival injection. Neck:  Supple, no carotid bruits  Heart:  RRR,   Lungs:  CTA bilaterally, bilateral mild expiratory wheeze bilaterally  Abdomen:   Bowel sounds present, soft, nontender, no masses, no organomegaly, no peritoneal signs  Extremities:  No clubbing, cyanosis, or edema  Skin:  Warm and dry, no open lesions or rash  Neuro:  Cranial nerves 2-12 intact, no focal deficits      DATA:     No results found for this or any previous visit (from the past 24 hour(s)). CTA CHEST W CONTRAST   Final Result   No evidence of pulmonary embolism or acute pulmonary abnormality. Right pleural effusion      Pulmonary emphysema/COPD      Small area of ground-glass opacity in the right lower lobe may reflect   atelectasis versus infiltrate      Findings suggestive of pulmonary hypertension         XR CHEST PORTABLE   Final Result   Increased opacity within the right lower lobe likely reflecting pulmonary   infiltrate and or effusion                 ASSESSMENT :      Active Problems:    Acute on chronic respiratory failure with hypoxia (HCC)  Resolved Problems:    * No resolved hospital problems. *    Underlying COPD with exacerbation  Pulmonary hypertension  Significant tobacco abuse  Small right pleural effusion  Right-sided heart failure    Plan :    Pulmonary to see  Oxygen supplementation  He will need ongoing pulmonary follow-up outpatient  Solu-Medrol for COPD exacerbation  Tobacco cessation discussed with patient and wife      Electronically signed by Crow Contreras MD on 10/18/2021 at 4:24 PM    NOTE: This report was transcribed using voice recognition software.  Every effort was made to ensure accuracy; however, inadvertent transcription errors may be present

## 2021-10-18 NOTE — CARE COORDINATION
SOCIAL WORK/CASEMANAGEMENT TRANSITION OF CARE [id number] Sumi Alvarez, [address], [place], [phone]): I met with pt and wife who are both retired. They live in a ranch home with 2 garage steps to enter home. Pt has a pcp and has seen within the year. He is not a . There is a tub/shower unit in the home with a used shower chair and  [facility] pta. Pt is independent with all adl's and drives. The plan is home with no needs anticipated per pt. Sw/cm to follow. Phylliss Lombard, LSW  10/18/2021

## 2021-10-18 NOTE — PROGRESS NOTES
Date: 10/18/2021    Time: 12:29 AM    Patient Placed On BIPAP/CPAP/ Non-Invasive Ventilation? Yes    If no must comment. Facial area red/color change? Yes           If YES are Blister/Lesion present? No   If yes must notify nursing staff  BIPAP/CPAP skin barrier?   Yes    Skin barrier type:mepilexlite       Comments:        Zarai Ingram RCP

## 2021-10-18 NOTE — PROGRESS NOTES
Message sent to Dr. Emani Connors regarding back pain and home pain med not ordered. Awaiting new orders. New orders received.

## 2021-10-18 NOTE — PLAN OF CARE
Problem: Skin Integrity:  Goal: Will show no infection signs and symptoms  Description: Will show no infection signs and symptoms  10/17/2021 2215 by Cm Cowan RN  Outcome: Met This Shift  10/17/2021 1848 by Mervat Stout RN  Outcome: Ongoing  Goal: Absence of new skin breakdown  Description: Absence of new skin breakdown  Outcome: Met This Shift     Problem: Falls - Risk of:  Goal: Will remain free from falls  Description: Will remain free from falls  Outcome: Met This Shift  Goal: Absence of physical injury  Description: Absence of physical injury  10/17/2021 2215 by Cm Cowan RN  Outcome: Met This Shift  10/17/2021 1848 by Mervat Stout RN  Outcome: Ongoing     Problem: Breathing Pattern - Ineffective:  Goal: Ability to achieve and maintain a regular respiratory rate will improve  Description: Ability to achieve and maintain a regular respiratory rate will improve  10/17/2021 2215 by Cm Cowan RN  Outcome: Met This Shift  10/17/2021 1848 by Mervat Stout RN  Outcome: Ongoing

## 2021-10-19 VITALS
HEIGHT: 71 IN | WEIGHT: 155 LBS | OXYGEN SATURATION: 93 % | TEMPERATURE: 98.1 F | SYSTOLIC BLOOD PRESSURE: 99 MMHG | RESPIRATION RATE: 18 BRPM | BODY MASS INDEX: 21.7 KG/M2 | HEART RATE: 81 BPM | DIASTOLIC BLOOD PRESSURE: 64 MMHG

## 2021-10-19 PROCEDURE — 94640 AIRWAY INHALATION TREATMENT: CPT

## 2021-10-19 PROCEDURE — 6370000000 HC RX 637 (ALT 250 FOR IP): Performed by: INTERNAL MEDICINE

## 2021-10-19 PROCEDURE — 2580000003 HC RX 258: Performed by: INTERNAL MEDICINE

## 2021-10-19 PROCEDURE — 6360000002 HC RX W HCPCS: Performed by: INTERNAL MEDICINE

## 2021-10-19 PROCEDURE — 94660 CPAP INITIATION&MGMT: CPT

## 2021-10-19 RX ORDER — PREDNISONE 10 MG/1
TABLET ORAL
Qty: 30 TABLET | Refills: 0 | Status: SHIPPED | OUTPATIENT
Start: 2021-10-19 | End: 2021-10-29

## 2021-10-19 RX ORDER — METHYLPREDNISOLONE SODIUM SUCCINATE 125 MG/2ML
40 INJECTION, POWDER, LYOPHILIZED, FOR SOLUTION INTRAMUSCULAR; INTRAVENOUS EVERY 12 HOURS
Status: DISCONTINUED | OUTPATIENT
Start: 2021-10-20 | End: 2021-10-19 | Stop reason: HOSPADM

## 2021-10-19 RX ORDER — PANTOPRAZOLE SODIUM 40 MG/1
40 TABLET, DELAYED RELEASE ORAL
Status: DISCONTINUED | OUTPATIENT
Start: 2021-10-20 | End: 2021-10-19 | Stop reason: HOSPADM

## 2021-10-19 RX ORDER — PREDNISONE 20 MG/1
40 TABLET ORAL DAILY
Status: DISCONTINUED | OUTPATIENT
Start: 2021-10-20 | End: 2021-10-19 | Stop reason: HOSPADM

## 2021-10-19 RX ADMIN — METHYLPREDNISOLONE SODIUM SUCCINATE 40 MG: 125 INJECTION, POWDER, FOR SOLUTION INTRAMUSCULAR; INTRAVENOUS at 07:00

## 2021-10-19 RX ADMIN — ASPIRIN 81 MG CHEWABLE TABLET 81 MG: 81 TABLET CHEWABLE at 08:42

## 2021-10-19 RX ADMIN — METOPROLOL SUCCINATE 25 MG: 25 TABLET, FILM COATED, EXTENDED RELEASE ORAL at 08:44

## 2021-10-19 RX ADMIN — HYDROCHLOROTHIAZIDE 25 MG: 25 TABLET ORAL at 08:42

## 2021-10-19 RX ADMIN — ALPRAZOLAM 0.5 MG: 0.25 TABLET ORAL at 15:03

## 2021-10-19 RX ADMIN — IPRATROPIUM BROMIDE AND ALBUTEROL SULFATE 1 AMPULE: .5; 2.5 SOLUTION RESPIRATORY (INHALATION) at 15:41

## 2021-10-19 RX ADMIN — IPRATROPIUM BROMIDE AND ALBUTEROL SULFATE 1 AMPULE: .5; 2.5 SOLUTION RESPIRATORY (INHALATION) at 11:35

## 2021-10-19 RX ADMIN — HYDROCODONE BITARTRATE AND ACETAMINOPHEN 1 TABLET: 10; 325 TABLET ORAL at 07:00

## 2021-10-19 RX ADMIN — Medication 10 ML: at 08:45

## 2021-10-19 RX ADMIN — ARFORMOTEROL TARTRATE 15 MCG: 15 SOLUTION RESPIRATORY (INHALATION) at 07:49

## 2021-10-19 RX ADMIN — HYDROCODONE BITARTRATE AND ACETAMINOPHEN 1 TABLET: 10; 325 TABLET ORAL at 13:22

## 2021-10-19 RX ADMIN — BUDESONIDE 500 MCG: 0.5 SUSPENSION RESPIRATORY (INHALATION) at 07:49

## 2021-10-19 RX ADMIN — IPRATROPIUM BROMIDE AND ALBUTEROL SULFATE 1 AMPULE: .5; 2.5 SOLUTION RESPIRATORY (INHALATION) at 07:49

## 2021-10-19 RX ADMIN — ENOXAPARIN SODIUM 40 MG: 40 INJECTION SUBCUTANEOUS at 08:43

## 2021-10-19 RX ADMIN — LISINOPRIL 2.5 MG: 5 TABLET ORAL at 08:42

## 2021-10-19 RX ADMIN — SERTRALINE 150 MG: 100 TABLET, FILM COATED ORAL at 08:42

## 2021-10-19 RX ADMIN — METHYLPREDNISOLONE SODIUM SUCCINATE 40 MG: 125 INJECTION, POWDER, FOR SOLUTION INTRAMUSCULAR; INTRAVENOUS at 15:29

## 2021-10-19 ASSESSMENT — PAIN SCALES - GENERAL
PAINLEVEL_OUTOF10: 8

## 2021-10-19 ASSESSMENT — PAIN DESCRIPTION - PAIN TYPE: TYPE: CHRONIC PAIN

## 2021-10-19 ASSESSMENT — PAIN DESCRIPTION - LOCATION: LOCATION: BACK

## 2021-10-19 NOTE — PROGRESS NOTES
Subjective:    Chief complaint:    Breathing is much improved  Wife is by the bedside. Objective:    BP 99/64   Pulse 81   Temp 98.1 °F (36.7 °C) (Temporal)   Resp 18   Ht 5' 11\" (1.803 m)   Wt 155 lb (70.3 kg)   SpO2 93%   BMI 21.62 kg/m²   General : Awake ,alert,no distress. Heart:  RRR, no murmurs, gallops, or rubs. Lungs:  CTA bilaterally, no wheeze, rales or rhonchi  Abd: bowel sounds present, nontender, nondistended, no masses  Extrem:  No clubbing, cyanosis, or edema    CBC:   Lab Results   Component Value Date    WBC 9.4 10/17/2021    RBC 6.18 10/17/2021    HGB 17.8 10/17/2021    HCT 55.9 10/17/2021    MCV 90.5 10/17/2021    MCH 28.8 10/17/2021    MCHC 31.8 10/17/2021    RDW 14.3 10/17/2021     10/17/2021    MPV 10.6 10/17/2021     BMP:    Lab Results   Component Value Date     10/17/2021    K 4.6 10/17/2021    CL 90 10/17/2021    CO2 30 10/17/2021    BUN 27 10/17/2021    LABALBU 3.9 10/17/2021    LABALBU 4.1 11/17/2010    CREATININE 0.6 10/17/2021    CALCIUM 9.3 10/17/2021    GFRAA >60 10/17/2021    LABGLOM >60 10/17/2021    GLUCOSE 160 10/17/2021    GLUCOSE 104 11/17/2010     PT/INR:    Lab Results   Component Value Date    PROTIME 12.4 10/09/2019    INR 1.0 10/09/2019     Troponin:    Lab Results   Component Value Date    TROPONINI <0.01 09/04/2019       No results for input(s): LABURIN in the last 72 hours.   Recent Labs     10/17/21  1016   BC 24 Hours no growth     Recent Labs     10/17/21  1315   BLOODCULT2 24 Hours no growth         Current Facility-Administered Medications:     HYDROcodone-acetaminophen (NORCO)  MG per tablet 1 tablet, 1 tablet, Oral, Q6H PRN, Mark Saxena MD, 1 tablet at 10/19/21 1322    methylPREDNISolone sodium (SOLU-MEDROL) injection 40 mg, 40 mg, IntraVENous, Q8H, Hemanth Antunez DO, 40 mg at 10/19/21 1529    nicotine (NICODERM CQ) 21 MG/24HR 1 patch, 1 patch, TransDERmal, Daily, Hemanth Antunez DO, 1 patch at 10/19/21 4694   ALPRAZolam Supriya Buggy) tablet 0.5 mg, 0.5 mg, Oral, BID PRN, Portillo Ferguson, DO, 0.5 mg at 10/19/21 1503    aspirin chewable tablet 81 mg, 81 mg, Oral, Daily, Portillo Ferguson, DO, 81 mg at 10/19/21 5155    hydroCHLOROthiazide (HYDRODIURIL) tablet 25 mg, 25 mg, Oral, Daily, Portillo Ferguson, DO, 25 mg at 10/19/21 6957    lisinopril (PRINIVIL;ZESTRIL) tablet 2.5 mg, 2.5 mg, Oral, Daily, Ngoc Llamas, DO, 2.5 mg at 10/19/21 2106    metoprolol succinate (TOPROL XL) extended release tablet 25 mg, 25 mg, Oral, Daily, Portillo Ferguson, DO, 25 mg at 10/19/21 0844    sertraline (ZOLOFT) tablet 150 mg, 150 mg, Oral, Daily, Portillo Ferguson, DO, 150 mg at 10/19/21 3213    sodium chloride flush 0.9 % injection 5-40 mL, 5-40 mL, IntraVENous, 2 times per day, Ngoc Llamas, DO, 10 mL at 10/19/21 0845    sodium chloride flush 0.9 % injection 5-40 mL, 5-40 mL, IntraVENous, PRN, Portillo Ferguson DO    0.9 % sodium chloride infusion, 25 mL, IntraVENous, PRN, Portillo Ferguson DO    enoxaparin (LOVENOX) injection 40 mg, 40 mg, SubCUTAneous, Daily, Portillo Ferguson, , 40 mg at 10/19/21 0843    ondansetron (ZOFRAN-ODT) disintegrating tablet 4 mg, 4 mg, Oral, Q8H PRN **OR** ondansetron (ZOFRAN) injection 4 mg, 4 mg, IntraVENous, Q6H PRN, Portillo Ferguson DO    polyethylene glycol (GLYCOLAX) packet 17 g, 17 g, Oral, Daily PRN, Portillo Ferguson,     acetaminophen (TYLENOL) tablet 650 mg, 650 mg, Oral, Q6H PRN **OR** acetaminophen (TYLENOL) suppository 650 mg, 650 mg, Rectal, Q6H PRN, Portillo Ferguson, DO    ipratropium-albuterol (DUONEB) nebulizer solution 1 ampule, 1 ampule, Inhalation, Q4H WA, Portillo Ferguson, DO, 1 ampule at 10/19/21 1135    budesonide (PULMICORT) nebulizer suspension 500 mcg, 0.5 mg, Nebulization, BID, 500 mcg at 10/19/21 0749 **AND** Arformoterol Tartrate (BROVANA) nebulizer solution 15 mcg, 15 mcg, Nebulization, BID, Portillo Ferguson, DO, 15 mcg at 10/19/21 8773    ADULT DIET; Regular    CTA CHEST W CONTRAST   Final Result   No evidence of pulmonary embolism or acute pulmonary abnormality. Right pleural effusion      Pulmonary emphysema/COPD      Small area of ground-glass opacity in the right lower lobe may reflect   atelectasis versus infiltrate      Findings suggestive of pulmonary hypertension         XR CHEST PORTABLE   Final Result   Increased opacity within the right lower lobe likely reflecting pulmonary   infiltrate and or effusion             Assessment:    Active Problems:    Acute on chronic respiratory failure with hypoxia (HCC)  Resolved Problems:    * No resolved hospital problems. *  COPD exacerbation    Plan:    Wean oxygen and steroids as able  Discharge once okay with pulmonary        Reginaldo Regan MD  3:30 PM  10/19/2021    NOTE: This report was transcribed using voice recognition software.  Every effort was made to ensure accuracy; however, inadvertent transcription errors may be present

## 2021-10-19 NOTE — PROGRESS NOTES
Catarina Madrid M.D.,[facility]  Steven Beltran D.O., F.A.C.O.I., Olena Lockhart M.D. Philly Garcia M.D. Jelly Marquez D.O. Daily Pulmonary Progress Note    Patient:  Clemente Delatorre 61 y.o. male MRN: [id number]     Date of Service: 10/19/2021      Synopsis     We are following patient for acute respiratory failure with hypoxia and hypercapnia    \"CC\" shortness of breath    Code status: Full      Subjective      Patient was seen and examined. Sitting up in bed in no acute distress. Oxygen on at 6 L nasal cannula, does not use at home. Asking when he can go home. He did use NIV in the form of AVAPS last at bedtime for respiratory support. Review of Systems:  Constitutional: Denies fever, weight loss, night sweats, and fatigue  Skin: Denies pigmentation, dark lesions, and rashes   HEENT: Denies hearing loss, tinnitus, ear drainage, epistaxis, sore throat, and hoarseness. Cardiovascular: Denies palpitations, chest pain, and chest pressure. Respiratory: Denies cough, dyspnea at rest, hemoptysis, apnea, and choking.   Gastrointestinal: Denies nausea, vomiting, poor appetite, diarrhea, heartburn or reflux  Genitourinary: Denies dysuria, frequency, urgency or hematuria  Musculoskeletal: Denies myalgias, muscle weakness, and bone pain  Neurological: Denies dizziness, vertigo, headache, and focal weakness  Psychological: Denies anxiety and depression  Endocrine: Denies heat intolerance and cold intolerance  Hematopoietic/Lymphatic: Denies bleeding problems and blood transfusions    24-hour events:  None    Objective   Vitals: BP 99/64   Pulse 81   Temp 98.1 °F (36.7 °C) (Temporal)   Resp 18   Ht 5' 11\" (1.803 m)   Wt 155 lb (70.3 kg)   SpO2 93%   BMI 21.62 kg/m²     I/O:    Intake/Output Summary (Last 24 hours) at 10/19/2021 1527  Last data filed at 10/19/2021 1441  Gross per 24 hour   Intake 850 ml   Output 3925 ml   Net -3075 ml       Vent Information  Skin Assessment: Clean, dry, & intact  Vt Ordered: 450 mL  FiO2 : 5 %  SpO2: 93 %  SpO2/FiO2 ratio: 1860  I Time/ I Time %: 0.8 s  Mask Type: Full face mask  Mask Size: Medium          CPAP/EPAP: 10 cmH2O     CURRENT MEDS :  Scheduled Meds:   methylPREDNISolone  40 mg IntraVENous Q8H    nicotine  1 patch TransDERmal Daily    aspirin  81 mg Oral Daily    hydroCHLOROthiazide  25 mg Oral Daily    lisinopril  2.5 mg Oral Daily    metoprolol succinate  25 mg Oral Daily    sertraline  150 mg Oral Daily    sodium chloride flush  5-40 mL IntraVENous 2 times per day    enoxaparin  40 mg SubCUTAneous Daily    ipratropium-albuterol  1 ampule Inhalation Q4H WA    budesonide  0.5 mg Nebulization BID    And    Arformoterol Tartrate  15 mcg Nebulization BID       Physical Exam:  General Appearance: appears comfortable in no acute distress. HEENT: Normocephalic atraumatic without obvious abnormality   Neck: Lips, mucosa, and tongue normal.  Supple, symmetrical, trachea midline, no adenopathy;thyroid:  no enlargement/tenderness/nodules or JVD. Lung: Breath sounds CTA. Respirations   unlabored. Symmetrical expansion. Heart: RRR, normal S1, S2. No MRG  Abdomen: Soft, NT, ND. BS present x 4 quadrants. No bruit or organomegaly. Extremities: Pedal pulses 2+ symmetric b/l. Extremities normal, no cyanosis, clubbing, or edema. Musculokeletal: No joint swelling, no muscle tenderness. ROM normal in all joints of extremities. Neurologic: Mental status: Alert and Oriented X3 .     Pertinent/ New Labs and Imaging Studies     Imaging Personally Reviewed:  Impression   No evidence of pulmonary embolism or acute pulmonary abnormality.       Right pleural effusion       Pulmonary emphysema/COPD       Small area of ground-glass opacity in the right lower lobe may reflect   atelectasis versus infiltrate       Findings suggestive of pulmonary hypertension                 ECHO [facility] cardiology 2/3/2021 interpretation by Dr. Patel Senior  LVEF 65%  Right failure secondary to COPD exacerbation  2. Acute exacerbation of COPD. Significant centrilobular emphysema. 3. Small right pleural effusion  4. Right-sided heart failure  5. Pulmonary hypertension by echo 2/21. Normal LVEF. RV mildly dilated with mild global systolic dysfunction, moderate TR, moderate to severe pulmonary hypertension TAPSE 1.6 RVSP 94 mmHg  6. Tobacco use 1.5 --2 PPD, 60-80-pack-year history  7. Anxiety/depression      Plan:   1. Oxygen therapy 6 L nasal cannula wean to keep greater than 90%  2. AVAPS volume 450 EPAP +10 backup rate 20 at at bedtime and as needed for respiratory support  3. Ambulatory oxygen testing prior to discharge he will likely need home oxygen  4. We will order home nebulizer to arrange with duo nebs. Continue bronchodilator therapy LAMA/LABA/ICS for discharge  5. Solu-Medrol every 8 hours with taper of oral prednisone  6. Nicotine patch  7. Tobacco cessation counseling  8. Diuresis as tolerated received Lasix x1 on 10/18/2021  9. Follow chest imaging  10. Will need outpatient PFT testing. May even benefit from home noninvasive ventilation if agreeable given the extent of significant centrilobular emphysema on CT imaging and hypercapnia on admission  11. Monitor off antibiotics  12. DVT, GI prophylaxis  13. Continue Zoloft for anxiety/depression  Message routed to office for follow-up  This plan of care was reviewed in collaboration with Dr. Sajan Thompson  Electronically signed by JACOBY Younger CNP on 10/19/2021 at 3:27 PM      I personally saw, examined, and cared for the patient. Labs, medications, radiographs reviewed. I agree with history exam and plans detailed in NP note. Clinically improved, however still needing O2.     Ambulatory pulse ox performed:  PULSE OX ON ROOM AIR SITTING 80%  PULSE OX ON 4L SITTING RECOVERY AT 84%   PULSE OX ON 6 LITERS SITTING RECOVERY 90%  PULSE OX ON 6 LITERS AMBULATING RECOVERY 93%    Patient needs continuous oxygen 6 L via nasal cannula. Diagnosis COPD, emphysema. Patient needs nebulizer for rescue therapy given significant emphysema and COPD. Outpatient PFT. Given hypercapnia may benefit from NIV. Will discuss further as outpatient. Strongly discussed smoking cessation with patient and spouse. May discharge from a pulmonary perspective once O2 arranged.     Meli French DO

## 2021-10-19 NOTE — PROGRESS NOTES
PULSE OX ON ROOM AIR SITTING 80%  PULSE OX ON 4L SITTING RECOVERY AT 84%   PULSE OX ON 6 LITERS SITTING RECOVERY 90%  PULSE OX ON 6 LITERS AMBULATING RECOVERY 93%

## 2021-10-19 NOTE — CARE COORDINATION
SOCIAL WORK/CASEMANAGEMENT TRANSITION OF CARE [id number] [facility] [email or web address], [place], [phone]): met with pt and wife and pt is eager to go home. Pt remains on iv solumedrol with 6l o2 nc. rn did pulse ox and pt is right now requiring 6l. Left joy for rn and will check o2 needs on discharge. Choices provided to couple for dme and they have no preference. Aquiles/ghassan to follow. SANDRA Brown  10/19/2021  Per pulmonary, pt will go home tentatively tonight. A nebulizer order is in epic and rn to do pulse ox. Pt tested earlier and was on 6l. Dr. Roni Caba to write o2 order. [facility] to setup the dme.  SANDRA Brown  10/19/2021

## 2021-10-19 NOTE — PATIENT CARE CONFERENCE
[facility] Quality Flow/Interdisciplinary Rounds Progress Note        Quality Flow Rounds held on October 19, 2021    Disciplines Attending:  Bedside Nurse, ,  and Nursing Unit Leadership    Mary Jones was admitted on 10/17/2021  9:34 AM    Anticipated Discharge Date:  Expected Discharge Date: 10/21/21    Disposition:    George Score:  George Scale Score: 20    Readmission Risk              Risk of Unplanned Readmission:  8           Discussed patient goal for the day, patient clinical progression, and barriers to discharge.   The following Goal(s) of the Day/Commitment(s) have been identified:  Pulse Ox - With Ambulation      Petey Zarco RN  October 19, 2021

## 2021-10-19 NOTE — PLAN OF CARE
Problem: Skin Integrity:  Goal: Will show no infection signs and symptoms  Description: Will show no infection signs and symptoms  10/19/2021 0414 by Maricarmen Rao RN  Outcome: Met This Shift  10/18/2021 1837 by Filiberto Solis RN  Outcome: Met This Shift  Goal: Absence of new skin breakdown  Description: Absence of new skin breakdown  10/19/2021 0414 by Maricarmen Rao RN  Outcome: Met This Shift  10/18/2021 1837 by Filiberto Solis RN  Outcome: Met This Shift     Problem: Falls - Risk of:  Goal: Will remain free from falls  Description: Will remain free from falls  Outcome: Met This Shift  Goal: Absence of physical injury  Description: Absence of physical injury  Outcome: Met This Shift     Problem: Breathing Pattern - Ineffective:  Goal: Ability to achieve and maintain a regular respiratory rate will improve  Description: Ability to achieve and maintain a regular respiratory rate will improve  Outcome: Met This Shift

## 2021-10-20 ENCOUNTER — TELEPHONE (OUTPATIENT)
Dept: FAMILY MEDICINE CLINIC | Age: 66
End: 2021-10-20

## 2021-10-22 LAB
BLOOD CULTURE, ROUTINE: NORMAL
CULTURE, BLOOD 2: NORMAL

## 2021-11-24 NOTE — DISCHARGE SUMMARY
Physician Discharge Summary     Patient ID:  Nitesh Roberto  [id number]  61 y.o.  1955    Admit date: 10/17/2021    Discharge date and time: 10/19/2021  7:17 PM     Admission Diagnoses: Active Problems:    Acute on chronic respiratory failure with hypoxia (HCC)  Resolved Problems:    * No resolved hospital problems. *      Discharge Diagnoses: Active Problems:    Acute on chronic respiratory failure with hypoxia (HCC)  Resolved Problems:    * No resolved hospital problems. *      Condition at discharge : Stable    Consults: IP CONSULT TO INTERNAL MEDICINE  IP CONSULT TO PULMONOLOGY    Procedures: None    Hospital Course: 70-year-old gentleman presents to the emergency with shortness of breath. Patient is an active smoker. He did have significant recent weight loss. He was noted to have bilateral leg edema. In the emergency patient was noted to be significantly hypoxic. He was noted to have right-sided pleural effusion. He was then admitted. Pulmonary was consulted. Solu-Medrol given. Tobacco cessation discussed with the patient. Patient improved with liver therapy. He was discharged home. CTA CHEST W CONTRAST   Final Result   No evidence of pulmonary embolism or acute pulmonary abnormality.       Right pleural effusion      Pulmonary emphysema/COPD      Small area of ground-glass opacity in the right lower lobe may reflect   atelectasis versus infiltrate      Findings suggestive of pulmonary hypertension         XR CHEST PORTABLE   Final Result   Increased opacity within the right lower lobe likely reflecting pulmonary   infiltrate and or effusion             Results for orders placed or performed in visit on 11/15/21 (from the past 336 hour(s))   Comprehensive Metabolic Panel    Collection Time: 11/11/21 12:00 AM   Result Value Ref Range    Sodium      Chloride 96 mmol/L    Potassium      BUN      CREATININE 0.64     Glucose      AST      ALT      Calcium      Total Protein 6.0     CO2 42 mmol/L Albumin      Alkaline Phosphatase 159 U/L    Total Bilirubin      Gfr Calculated      Anion Gap     Amylase    Collection Time: 11/11/21 12:00 AM   Result Value Ref Range    Amylase 54 units/L   Lipase    Collection Time: 11/11/21 12:00 AM   Result Value Ref Range    Lipase 51 units/L   Results for orders placed or performed in visit on 11/15/21 (from the past 336 hour(s))   CBC    Collection Time: 11/11/21 12:00 AM   Result Value Ref Range    WBC      Hemoglobin      Hematocrit      Platelets      Neutrophils %      Lymphocytes %      Monocytes %      Eosinophils %      Basophils %      Neutrophils Absolute      Lymphocytes Absolute      Monocytes Absolute      Eosinophils Absolute      Basophils Absolute      RBC      MCV      MCH      MCHC      MPV           Discharge Exam:  See progress note from today    Disposition: Home    Patient Instructions:   Discharge Medication List as of 10/19/2021  6:21 PM      START taking these medications    Details   predniSONE (DELTASONE) 10 MG tablet 30 mg daily for 5 days then 20 mg daily for 5 days then 10 mg daily for 5 days, Disp-30 tablet, R-0Normal         CONTINUE these medications which have CHANGED    Details   !! sertraline (ZOLOFT) 50 MG tablet Take 3 tablets by mouth daily, Disp-30 tablet, R-3Normal       !! - Potential duplicate medications found. Please discuss with provider. CONTINUE these medications which have NOT CHANGED    Details   fluticasone-salmeterol (ADVAIR DISKUS) 250-50 MCG/DOSE AEPB Inhale 1 puff into the lungs every 12 hours, Disp-1 each, R-5Normal      HYDROcodone-acetaminophen (NORCO)  MG per tablet Take 1 tablet by mouth every 6 hours as needed for Pain for up to 30 days. , Disp-120 tablet, R-0Normal      meloxicam (MOBIC) 15 MG tablet Take one tablet daily, Disp-90 tablet, R-3Normal      ALPRAZolam (XANAX) 0.5 MG tablet Take 1 tablet by mouth 2 times daily as needed for Sleep or Anxiety for up to 30 days. , Disp-60 tablet, R-2Normal nitroGLYCERIN (NITROSTAT) 0.4 MG SL tablet Place 1 tablet under the tongue every 5 minutes as needed for Chest pain up to max of 3 total doses. If no relief after 1 dose, call 911., Disp-25 tablet, R-1Normal      hydroCHLOROthiazide (HYDRODIURIL) 25 MG tablet TAKE 1 TABLET BY MOUTH EVERY DAY IN THE MORNING, Disp-30 tablet, R-5Normal      !! sertraline (ZOLOFT) 100 MG tablet TAKE ONE AND ONE-HALF TABLETS DAILY, Disp-135 tablet, R-3Normal      aspirin 81 MG tablet Take 1 tablet by mouth daily DO NOT TAKE ASPIRIN FOR 1 WEEK, Disp-30 tablet, R-3Normal      lisinopril (PRINIVIL;ZESTRIL) 2.5 MG tablet Take 1 tablet by mouth daily, Disp-90 tablet, R-3Normal      metoprolol succinate (TOPROL XL) 25 MG extended release tablet Take 1 tablet by mouth daily, Disp-90 tablet, R-3Normal      sildenafil (VIAGRA) 100 MG tablet Take 100 mg by mouth as needed for Erectile Dysfunction Instructed to not use 24 hours pre-opHistorical Med       !! - Potential duplicate medications found. Please discuss with provider.           Activity: As tolerated    Diet: Regular    Follow-up with Veronica Marion, APRN - CNP  [address] [facility] [phone]    In 2 weeks      [facility], [address] [place] [address]  [phone]    Schedule an appointment as soon as possible for a visit  follow up        Note that over 30 minutes was spent in preparing discharge papers, discussing discharge with patient, medication review, etc.    Signed:  Savage Maynard MD  11/23/2021  7:54 PM

## 2022-09-19 PROBLEM — F41.9 ANXIETY DISORDER: Status: ACTIVE | Noted: 2022-09-19

## 2022-09-19 PROBLEM — G89.29 CHRONIC LOW BACK PAIN: Status: ACTIVE | Noted: 2021-09-21

## 2022-09-19 PROBLEM — J44.1 ACUTE EXACERBATION OF CHRONIC OBSTRUCTIVE PULMONARY DISEASE (HCC): Status: ACTIVE | Noted: 2022-09-19

## 2022-12-14 ENCOUNTER — TELEPHONE (OUTPATIENT)
Dept: GENERAL RADIOLOGY | Age: 67
End: 2022-12-14

## 2022-12-14 NOTE — TELEPHONE ENCOUNTER
I called the patient and he confirmed his CT lung screening at [facility] on 12/15/2022 at 11:00 am.  I reminded the patient to arrive at 10:30 am, enter through the main entrance, and register. Patient confirmed.           Electronically signed by Edu Bolton on 12/14/22 at 3:50 PM EST

## 2022-12-15 ENCOUNTER — HOSPITAL ENCOUNTER (OUTPATIENT)
Dept: CT IMAGING | Age: 67
Discharge: HOME | End: 2022-12-17
Payer: MEDICARE

## 2022-12-15 DIAGNOSIS — Z87.891 HISTORY OF TOBACCO USE: ICD-10-CM

## 2022-12-15 PROCEDURE — 71271 CT THORAX LUNG CANCER SCR C-: CPT

## 2022-12-19 ENCOUNTER — TELEPHONE (OUTPATIENT)
Dept: GENERAL RADIOLOGY | Age: 67
End: 2022-12-19

## 2022-12-19 NOTE — TELEPHONE ENCOUNTER
No call, encounter opened to process CT Lung Screening. CT Lung Screen: 12/15/2022      Impression   1. Low-grade inflammatory process seen throughout the right lower lobe and to   a lesser extent within the right middle lobe favored to represent pneumonia. 2. Stable noncalcified nodule within the inferior aspect of the right upper   lobe abutting the major fissure measuring 6.5 mm.   3. New 3.5 mm nodule seen within the left lower lobe. 4. Stable benign 6 mm calcified granuloma within the right lung base and   stable benign calcified lymph nodes within the right hilum consistent with   benign calcified granulomatous disease. LUNG RADS:   Per ACR Lung-RADS Version 1.1       Category 2, Benign appearance or behavior. Management:  Continue annual lung screening with LDCT in 12 months. RECOMMENDATIONS:   If you would like to register your patient with the [place] [facility], please contact the Nurse Navigator at   [phone]. Pack years: 100    Social History     Tobacco Use  Smoking Status: Some Day Smoker    Start Date: 28   Quit Date: 10/17/2021   Types: Cigarettes   Packs/Day: 2   Years: 48   Pack Years: 100   Smokeless Tobacco: Never         Results letter sent to patient via my chart or mailed.      [facility]

## 2023-09-11 ENCOUNTER — HOSPITAL ENCOUNTER (INPATIENT)
Age: 68
LOS: 2 days | Discharge: HOME | DRG: 309 | End: 2023-09-13
Attending: STUDENT IN AN ORGANIZED HEALTH CARE EDUCATION/TRAINING PROGRAM | Admitting: INTERNAL MEDICINE
Payer: MEDICARE

## 2023-09-11 ENCOUNTER — APPOINTMENT (OUTPATIENT)
Dept: GENERAL RADIOLOGY | Age: 68
DRG: 309 | End: 2023-09-11
Payer: MEDICARE

## 2023-09-11 DIAGNOSIS — I48.91 ATRIAL FIBRILLATION WITH RAPID VENTRICULAR RESPONSE (HCC): Primary | ICD-10-CM

## 2023-09-11 DIAGNOSIS — J44.1 ACUTE EXACERBATION OF CHRONIC OBSTRUCTIVE PULMONARY DISEASE (COPD) (HCC): ICD-10-CM

## 2023-09-11 DIAGNOSIS — J43.2 CENTRILOBULAR EMPHYSEMA (HCC): ICD-10-CM

## 2023-09-11 DIAGNOSIS — I10 ESSENTIAL HYPERTENSION: ICD-10-CM

## 2023-09-11 LAB
ALBUMIN SERPL-MCNC: 4.2 G/DL (ref 3.5–5.2)
ALP SERPL-CCNC: 152 U/L (ref 40–129)
ALT SERPL-CCNC: 20 U/L (ref 0–40)
ANION GAP SERPL CALCULATED.3IONS-SCNC: 7 MMOL/L (ref 7–16)
AST SERPL-CCNC: 26 U/L (ref 0–39)
BASOPHILS # BLD: 0.05 K/UL (ref 0–0.2)
BASOPHILS NFR BLD: 0 % (ref 0–2)
BILIRUB SERPL-MCNC: 0.4 MG/DL (ref 0–1.2)
BNP SERPL-MCNC: 320 PG/ML (ref 0–125)
BUN SERPL-MCNC: 10 MG/DL (ref 6–23)
CALCIUM SERPL-MCNC: 9.5 MG/DL (ref 8.6–10.2)
CHLORIDE SERPL-SCNC: 95 MMOL/L (ref 98–107)
CO2 SERPL-SCNC: 37 MMOL/L (ref 22–29)
CREAT SERPL-MCNC: 0.6 MG/DL (ref 0.7–1.2)
D DIMER: <200 NG/ML DDU (ref 0–232)
EOSINOPHIL # BLD: 0.06 K/UL (ref 0.05–0.5)
EOSINOPHILS RELATIVE PERCENT: 1 % (ref 0–6)
ERYTHROCYTE [DISTWIDTH] IN BLOOD BY AUTOMATED COUNT: 12.1 % (ref 11.5–15)
GFR SERPL CREATININE-BSD FRML MDRD: >60 ML/MIN/1.73M2
GLUCOSE SERPL-MCNC: 153 MG/DL (ref 74–99)
HCT VFR BLD AUTO: 41.7 % (ref 37–54)
HGB BLD-MCNC: 12.9 G/DL (ref 12.5–16.5)
IMM GRANULOCYTES # BLD AUTO: 0.07 K/UL (ref 0–0.58)
IMM GRANULOCYTES NFR BLD: 1 % (ref 0–5)
LYMPHOCYTES NFR BLD: 1.07 K/UL (ref 1.5–4)
LYMPHOCYTES RELATIVE PERCENT: 9 % (ref 20–42)
MCH RBC QN AUTO: 28.6 PG (ref 26–35)
MCHC RBC AUTO-ENTMCNC: 30.9 G/DL (ref 32–34.5)
MCV RBC AUTO: 92.5 FL (ref 80–99.9)
MONOCYTES NFR BLD: 0.61 K/UL (ref 0.1–0.95)
MONOCYTES NFR BLD: 5 % (ref 2–12)
NEUTROPHILS NFR BLD: 84 % (ref 43–80)
NEUTS SEG NFR BLD: 9.89 K/UL (ref 1.8–7.3)
PLATELET # BLD AUTO: 220 K/UL (ref 130–450)
PMV BLD AUTO: 10.5 FL (ref 7–12)
POTASSIUM SERPL-SCNC: 4.5 MMOL/L (ref 3.5–5)
PROT SERPL-MCNC: 7.1 G/DL (ref 6.4–8.3)
RBC # BLD AUTO: 4.51 M/UL (ref 3.8–5.8)
SARS-COV-2 RDRP RESP QL NAA+PROBE: NOT DETECTED
SODIUM SERPL-SCNC: 139 MMOL/L (ref 132–146)
SPECIMEN DESCRIPTION: NORMAL
TROPONIN I SERPL HS-MCNC: 6 NG/L (ref 0–11)
TROPONIN I SERPL HS-MCNC: <6 NG/L (ref 0–11)
WBC OTHER # BLD: 11.8 K/UL (ref 4.5–11.5)

## 2023-09-11 PROCEDURE — 2060000000 HC ICU INTERMEDIATE R&B

## 2023-09-11 PROCEDURE — 93005 ELECTROCARDIOGRAM TRACING: CPT

## 2023-09-11 PROCEDURE — 87635 SARS-COV-2 COVID-19 AMP PRB: CPT

## 2023-09-11 PROCEDURE — 96361 HYDRATE IV INFUSION ADD-ON: CPT

## 2023-09-11 PROCEDURE — 96374 THER/PROPH/DIAG INJ IV PUSH: CPT

## 2023-09-11 PROCEDURE — 2580000003 HC RX 258: Performed by: INTERNAL MEDICINE

## 2023-09-11 PROCEDURE — 2500000003 HC RX 250 WO HCPCS: Performed by: STUDENT IN AN ORGANIZED HEALTH CARE EDUCATION/TRAINING PROGRAM

## 2023-09-11 PROCEDURE — 6370000000 HC RX 637 (ALT 250 FOR IP): Performed by: INTERNAL MEDICINE

## 2023-09-11 PROCEDURE — 85379 FIBRIN DEGRADATION QUANT: CPT

## 2023-09-11 PROCEDURE — 2580000003 HC RX 258: Performed by: STUDENT IN AN ORGANIZED HEALTH CARE EDUCATION/TRAINING PROGRAM

## 2023-09-11 PROCEDURE — 6370000000 HC RX 637 (ALT 250 FOR IP): Performed by: STUDENT IN AN ORGANIZED HEALTH CARE EDUCATION/TRAINING PROGRAM

## 2023-09-11 PROCEDURE — 0202U NFCT DS 22 TRGT SARS-COV-2: CPT

## 2023-09-11 PROCEDURE — 99285 EMERGENCY DEPT VISIT HI MDM: CPT

## 2023-09-11 PROCEDURE — 85025 COMPLETE CBC W/AUTO DIFF WBC: CPT

## 2023-09-11 PROCEDURE — 83880 ASSAY OF NATRIURETIC PEPTIDE: CPT

## 2023-09-11 PROCEDURE — 71045 X-RAY EXAM CHEST 1 VIEW: CPT

## 2023-09-11 PROCEDURE — 94640 AIRWAY INHALATION TREATMENT: CPT

## 2023-09-11 PROCEDURE — 84484 ASSAY OF TROPONIN QUANT: CPT

## 2023-09-11 PROCEDURE — 96375 TX/PRO/DX INJ NEW DRUG ADDON: CPT

## 2023-09-11 PROCEDURE — 2500000003 HC RX 250 WO HCPCS

## 2023-09-11 PROCEDURE — 6360000002 HC RX W HCPCS: Performed by: STUDENT IN AN ORGANIZED HEALTH CARE EDUCATION/TRAINING PROGRAM

## 2023-09-11 PROCEDURE — 80053 COMPREHEN METABOLIC PANEL: CPT

## 2023-09-11 RX ORDER — IPRATROPIUM BROMIDE AND ALBUTEROL SULFATE 2.5; .5 MG/3ML; MG/3ML
1 SOLUTION RESPIRATORY (INHALATION)
Status: DISCONTINUED | OUTPATIENT
Start: 2023-09-11 | End: 2023-09-13 | Stop reason: HOSPADM

## 2023-09-11 RX ORDER — ALPRAZOLAM 0.25 MG/1
0.5 TABLET ORAL EVERY 8 HOURS PRN
Status: DISCONTINUED | OUTPATIENT
Start: 2023-09-11 | End: 2023-09-13 | Stop reason: HOSPADM

## 2023-09-11 RX ORDER — SODIUM CHLORIDE 0.9 % (FLUSH) 0.9 %
10 SYRINGE (ML) INJECTION PRN
Status: DISCONTINUED | OUTPATIENT
Start: 2023-09-11 | End: 2023-09-13 | Stop reason: HOSPADM

## 2023-09-11 RX ORDER — ASPIRIN 81 MG/1
81 TABLET ORAL DAILY
Status: DISCONTINUED | OUTPATIENT
Start: 2023-09-11 | End: 2023-09-12

## 2023-09-11 RX ORDER — SODIUM CHLORIDE 9 MG/ML
INJECTION, SOLUTION INTRAVENOUS PRN
Status: DISCONTINUED | OUTPATIENT
Start: 2023-09-11 | End: 2023-09-13 | Stop reason: HOSPADM

## 2023-09-11 RX ORDER — METHYLPREDNISOLONE SODIUM SUCCINATE 40 MG/ML
40 INJECTION, POWDER, LYOPHILIZED, FOR SOLUTION INTRAMUSCULAR; INTRAVENOUS EVERY 8 HOURS
Status: DISCONTINUED | OUTPATIENT
Start: 2023-09-12 | End: 2023-09-13 | Stop reason: HOSPADM

## 2023-09-11 RX ORDER — ACETAMINOPHEN 325 MG/1
650 TABLET ORAL EVERY 6 HOURS PRN
Status: DISCONTINUED | OUTPATIENT
Start: 2023-09-11 | End: 2023-09-13 | Stop reason: HOSPADM

## 2023-09-11 RX ORDER — POTASSIUM CHLORIDE 20 MEQ/1
40 TABLET, EXTENDED RELEASE ORAL PRN
Status: DISCONTINUED | OUTPATIENT
Start: 2023-09-11 | End: 2023-09-13 | Stop reason: HOSPADM

## 2023-09-11 RX ORDER — DILTIAZEM HYDROCHLORIDE 5 MG/ML
15 INJECTION INTRAVENOUS ONCE
Status: COMPLETED | OUTPATIENT
Start: 2023-09-11 | End: 2023-09-11

## 2023-09-11 RX ORDER — MAGNESIUM SULFATE IN WATER 40 MG/ML
2000 INJECTION, SOLUTION INTRAVENOUS PRN
Status: DISCONTINUED | OUTPATIENT
Start: 2023-09-11 | End: 2023-09-13 | Stop reason: HOSPADM

## 2023-09-11 RX ORDER — QUETIAPINE FUMARATE 100 MG/1
100 TABLET, FILM COATED ORAL NIGHTLY
Status: DISCONTINUED | OUTPATIENT
Start: 2023-09-11 | End: 2023-09-13 | Stop reason: HOSPADM

## 2023-09-11 RX ORDER — ACETAMINOPHEN 650 MG/1
650 SUPPOSITORY RECTAL EVERY 6 HOURS PRN
Status: DISCONTINUED | OUTPATIENT
Start: 2023-09-11 | End: 2023-09-13 | Stop reason: HOSPADM

## 2023-09-11 RX ORDER — LANOLIN ALCOHOL/MO/W.PET/CERES
3 CREAM (GRAM) TOPICAL NIGHTLY PRN
Status: DISCONTINUED | OUTPATIENT
Start: 2023-09-11 | End: 2023-09-13 | Stop reason: HOSPADM

## 2023-09-11 RX ORDER — METOPROLOL SUCCINATE 25 MG/1
25 TABLET, EXTENDED RELEASE ORAL DAILY
Status: DISCONTINUED | OUTPATIENT
Start: 2023-09-11 | End: 2023-09-12

## 2023-09-11 RX ORDER — ONDANSETRON 4 MG/1
4 TABLET, ORALLY DISINTEGRATING ORAL EVERY 8 HOURS PRN
Status: DISCONTINUED | OUTPATIENT
Start: 2023-09-11 | End: 2023-09-13 | Stop reason: HOSPADM

## 2023-09-11 RX ORDER — POTASSIUM CHLORIDE 7.45 MG/ML
10 INJECTION INTRAVENOUS PRN
Status: DISCONTINUED | OUTPATIENT
Start: 2023-09-11 | End: 2023-09-13 | Stop reason: HOSPADM

## 2023-09-11 RX ORDER — HYDROCODONE BITARTRATE AND ACETAMINOPHEN 10; 325 MG/1; MG/1
1 TABLET ORAL EVERY 6 HOURS PRN
Status: DISCONTINUED | OUTPATIENT
Start: 2023-09-11 | End: 2023-09-13 | Stop reason: HOSPADM

## 2023-09-11 RX ORDER — IPRATROPIUM BROMIDE AND ALBUTEROL SULFATE 2.5; .5 MG/3ML; MG/3ML
3 SOLUTION RESPIRATORY (INHALATION) ONCE
Status: COMPLETED | OUTPATIENT
Start: 2023-09-11 | End: 2023-09-11

## 2023-09-11 RX ORDER — PANTOPRAZOLE SODIUM 20 MG/1
20 TABLET, DELAYED RELEASE ORAL DAILY
Status: DISCONTINUED | OUTPATIENT
Start: 2023-09-12 | End: 2023-09-13 | Stop reason: HOSPADM

## 2023-09-11 RX ORDER — LISINOPRIL 2.5 MG/1
2.5 TABLET ORAL DAILY
Status: DISCONTINUED | OUTPATIENT
Start: 2023-09-11 | End: 2023-09-11 | Stop reason: SDUPTHER

## 2023-09-11 RX ORDER — SODIUM CHLORIDE 0.9 % (FLUSH) 0.9 %
10 SYRINGE (ML) INJECTION EVERY 12 HOURS SCHEDULED
Status: DISCONTINUED | OUTPATIENT
Start: 2023-09-11 | End: 2023-09-13 | Stop reason: HOSPADM

## 2023-09-11 RX ORDER — 0.9 % SODIUM CHLORIDE 0.9 %
1000 INTRAVENOUS SOLUTION INTRAVENOUS ONCE
Status: COMPLETED | OUTPATIENT
Start: 2023-09-11 | End: 2023-09-11

## 2023-09-11 RX ORDER — ONDANSETRON 2 MG/ML
4 INJECTION INTRAMUSCULAR; INTRAVENOUS EVERY 6 HOURS PRN
Status: DISCONTINUED | OUTPATIENT
Start: 2023-09-11 | End: 2023-09-13 | Stop reason: HOSPADM

## 2023-09-11 RX ORDER — SENNOSIDES A AND B 8.6 MG/1
1 TABLET, FILM COATED ORAL DAILY PRN
Status: DISCONTINUED | OUTPATIENT
Start: 2023-09-11 | End: 2023-09-13 | Stop reason: HOSPADM

## 2023-09-11 RX ORDER — BUDESONIDE 0.5 MG/2ML
0.5 INHALANT ORAL
Status: DISCONTINUED | OUTPATIENT
Start: 2023-09-11 | End: 2023-09-12 | Stop reason: SDUPTHER

## 2023-09-11 RX ORDER — SERTRALINE HYDROCHLORIDE 100 MG/1
100 TABLET, FILM COATED ORAL DAILY
Status: DISCONTINUED | OUTPATIENT
Start: 2023-09-12 | End: 2023-09-11

## 2023-09-11 RX ORDER — LISINOPRIL 2.5 MG/1
2.5 TABLET ORAL DAILY
Status: DISCONTINUED | OUTPATIENT
Start: 2023-09-12 | End: 2023-09-13 | Stop reason: HOSPADM

## 2023-09-11 RX ORDER — SERTRALINE HYDROCHLORIDE 100 MG/1
100 TABLET, FILM COATED ORAL NIGHTLY
Status: DISCONTINUED | OUTPATIENT
Start: 2023-09-11 | End: 2023-09-13 | Stop reason: HOSPADM

## 2023-09-11 RX ORDER — MELOXICAM 7.5 MG/1
15 TABLET ORAL DAILY
Status: DISCONTINUED | OUTPATIENT
Start: 2023-09-12 | End: 2023-09-13 | Stop reason: HOSPADM

## 2023-09-11 RX ADMIN — SODIUM CHLORIDE 5 MG/HR: 900 INJECTION, SOLUTION INTRAVENOUS at 18:40

## 2023-09-11 RX ADMIN — SODIUM CHLORIDE, PRESERVATIVE FREE 10 ML: 5 INJECTION INTRAVENOUS at 21:37

## 2023-09-11 RX ADMIN — HYDROCODONE BITARTRATE AND ACETAMINOPHEN 1 TABLET: 10; 325 TABLET ORAL at 21:37

## 2023-09-11 RX ADMIN — SODIUM CHLORIDE 7.5 MG/HR: 900 INJECTION, SOLUTION INTRAVENOUS at 19:10

## 2023-09-11 RX ADMIN — APIXABAN 5 MG: 5 TABLET, FILM COATED ORAL at 21:37

## 2023-09-11 RX ADMIN — METOPROLOL SUCCINATE 25 MG: 25 TABLET, EXTENDED RELEASE ORAL at 21:37

## 2023-09-11 RX ADMIN — QUETIAPINE FUMARATE 100 MG: 100 TABLET ORAL at 21:37

## 2023-09-11 RX ADMIN — DILTIAZEM HYDROCHLORIDE 15 MG: 5 INJECTION INTRAVENOUS at 17:17

## 2023-09-11 RX ADMIN — SERTRALINE 100 MG: 100 TABLET, FILM COATED ORAL at 21:37

## 2023-09-11 RX ADMIN — METHYLPREDNISOLONE SODIUM SUCCINATE 125 MG: 125 INJECTION, POWDER, FOR SOLUTION INTRAMUSCULAR; INTRAVENOUS at 16:24

## 2023-09-11 RX ADMIN — ASPIRIN 81 MG: 81 TABLET, COATED ORAL at 21:37

## 2023-09-11 RX ADMIN — IPRATROPIUM BROMIDE AND ALBUTEROL SULFATE 3 DOSE: 2.5; .5 SOLUTION RESPIRATORY (INHALATION) at 17:53

## 2023-09-11 RX ADMIN — SODIUM CHLORIDE 1000 ML: 9 INJECTION, SOLUTION INTRAVENOUS at 16:28

## 2023-09-11 ASSESSMENT — PAIN - FUNCTIONAL ASSESSMENT
PAIN_FUNCTIONAL_ASSESSMENT: NONE - DENIES PAIN
PAIN_FUNCTIONAL_ASSESSMENT: NONE - DENIES PAIN

## 2023-09-11 ASSESSMENT — PAIN DESCRIPTION - ORIENTATION: ORIENTATION: LOWER

## 2023-09-11 ASSESSMENT — LIFESTYLE VARIABLES
HOW MANY STANDARD DRINKS CONTAINING ALCOHOL DO YOU HAVE ON A TYPICAL DAY: PATIENT DOES NOT DRINK
HOW OFTEN DO YOU HAVE A DRINK CONTAINING ALCOHOL: NEVER

## 2023-09-11 ASSESSMENT — PAIN SCALES - GENERAL: PAINLEVEL_OUTOF10: 8

## 2023-09-11 ASSESSMENT — PAIN DESCRIPTION - LOCATION: LOCATION: BACK

## 2023-09-11 NOTE — ED PROVIDER NOTES
ventricular response ([address]). A diagnosis of Acute exacerbation of chronic obstructive pulmonary disease (COPD) ([address]) was also pertinent to this visit.   Current Discharge Medication List        MD Deanna Alexander MD  09/12/23 2580

## 2023-09-11 NOTE — H&P
bilaterally, minimal air movement, on 4 L of oxygen, wheeze throughout all phases of expiration, moderate increased work of breathing  Heart: regular rate and regular rhythm, no murmur, normal S1, S2  Abdomen: soft, non-tender; bowel sounds normal; no masses, no organomegaly  : Deferred   Extremities: no lower extremity edema, extremities atraumatic, no cyanosis, no clubbing, 2+ pedal pulses palpated  Skin: normal color, normal texture, normal turgor, no rashes, no lesions  Neurologic:5/5 muscle strength throughout, normal muscle tone throughout, face symmetric, hearing intact, tongue midline, speech appropriate without slurring, sensation to fine touch intact in upper and lower extremities    Labs-   Lab Results   Component Value Date    WBC 6.8 09/12/2023    HGB 11.5 (L) 09/12/2023    HCT 36.4 (L) 09/12/2023     09/12/2023     09/11/2023    K 4.5 09/11/2023    CL 95 (L) 09/11/2023    CREATININE 0.6 (L) 09/11/2023    BUN 10 09/11/2023    CO2 37 (H) 09/11/2023    GLUCOSE 153 (H) 09/11/2023    ALT 20 09/11/2023    AST 26 09/11/2023    INR 1.0 10/09/2019    APTT 29.4 10/09/2019       No results found for: \"IRON\", \"FERRITIN\", \"TIBC\", \"SI98OZX\"    Lab Results   Component Value Date    TROPHS <6 09/11/2023    TROPHS 6 09/11/2023    TROPHS 13 (H) 10/17/2021        Lab Results   Component Value Date    VITD25 33.6 03/31/2021         Recent Radiological Studies:  XR CHEST PORTABLE   Final Result   No acute process.       COPD             Assessment  Active Hospital Problems    Diagnosis     Atrial fibrillation with RVR (HCC) [I48.91]      Priority: High    Acute exacerbation of chronic obstructive pulmonary disease (HCC) [J44.1]      Priority: Medium    CAD (coronary artery disease) [I25.10]     Anxiety disorder [F41.9]     Chronic low back pain [M54.50, G89.29]     Lymphedema of both lower extremities [I89.0]        Plan  Afib w/ RVR:   Telemetry  Cardiology consultation-- defer rate/rhythm control meds to

## 2023-09-12 PROBLEM — I25.10 CAD (CORONARY ARTERY DISEASE): Status: ACTIVE | Noted: 2023-09-12

## 2023-09-12 PROBLEM — I36.1 NON-RHEUMATIC TRICUSPID VALVE INSUFFICIENCY: Status: ACTIVE | Noted: 2023-09-12

## 2023-09-12 PROBLEM — Z79.01 LONG TERM (CURRENT) USE OF ANTICOAGULANTS: Status: ACTIVE | Noted: 2023-09-12

## 2023-09-12 LAB
ALBUMIN SERPL-MCNC: 3.4 G/DL (ref 3.5–5.2)
ALP SERPL-CCNC: 123 U/L (ref 40–129)
ALT SERPL-CCNC: 16 U/L (ref 0–40)
ANION GAP SERPL CALCULATED.3IONS-SCNC: 7 MMOL/L (ref 7–16)
AST SERPL-CCNC: 16 U/L (ref 0–39)
B PARAP IS1001 DNA NPH QL NAA+NON-PROBE: NOT DETECTED
B PERT DNA SPEC QL NAA+PROBE: NOT DETECTED
BASOPHILS # BLD: 0 K/UL (ref 0–0.2)
BASOPHILS NFR BLD: 0 % (ref 0–2)
BILIRUB SERPL-MCNC: 0.2 MG/DL (ref 0–1.2)
BUN SERPL-MCNC: 11 MG/DL (ref 6–23)
C PNEUM DNA NPH QL NAA+NON-PROBE: NOT DETECTED
CALCIUM SERPL-MCNC: 8.9 MG/DL (ref 8.6–10.2)
CHLORIDE SERPL-SCNC: 98 MMOL/L (ref 98–107)
CO2 SERPL-SCNC: 32 MMOL/L (ref 22–29)
CREAT SERPL-MCNC: 0.5 MG/DL (ref 0.7–1.2)
EKG ATRIAL RATE: 182 BPM
EKG ATRIAL RATE: 75 BPM
EKG Q-T INTERVAL: 276 MS
EKG Q-T INTERVAL: 368 MS
EKG QRS DURATION: 84 MS
EKG QRS DURATION: 92 MS
EKG QTC CALCULATION (BAZETT): 410 MS
EKG QTC CALCULATION (BAZETT): 433 MS
EKG R AXIS: 58 DEGREES
EKG R AXIS: 60 DEGREES
EKG T AXIS: 56 DEGREES
EKG T AXIS: 58 DEGREES
EKG VENTRICULAR RATE: 148 BPM
EKG VENTRICULAR RATE: 75 BPM
EOSINOPHIL # BLD: 0 K/UL (ref 0.05–0.5)
EOSINOPHILS RELATIVE PERCENT: 0 % (ref 0–6)
ERYTHROCYTE [DISTWIDTH] IN BLOOD BY AUTOMATED COUNT: 12.1 % (ref 11.5–15)
FLUAV RNA NPH QL NAA+NON-PROBE: NOT DETECTED
FLUBV RNA NPH QL NAA+NON-PROBE: NOT DETECTED
GFR SERPL CREATININE-BSD FRML MDRD: >60 ML/MIN/1.73M2
GLUCOSE SERPL-MCNC: 168 MG/DL (ref 74–99)
HADV DNA NPH QL NAA+NON-PROBE: NOT DETECTED
HCOV 229E RNA NPH QL NAA+NON-PROBE: NOT DETECTED
HCOV HKU1 RNA NPH QL NAA+NON-PROBE: NOT DETECTED
HCOV NL63 RNA NPH QL NAA+NON-PROBE: NOT DETECTED
HCOV OC43 RNA NPH QL NAA+NON-PROBE: NOT DETECTED
HCT VFR BLD AUTO: 36.4 % (ref 37–54)
HGB BLD-MCNC: 11.5 G/DL (ref 12.5–16.5)
HMPV RNA NPH QL NAA+NON-PROBE: NOT DETECTED
HPIV1 RNA NPH QL NAA+NON-PROBE: NOT DETECTED
HPIV2 RNA NPH QL NAA+NON-PROBE: NOT DETECTED
HPIV3 RNA NPH QL NAA+NON-PROBE: NOT DETECTED
HPIV4 RNA NPH QL NAA+NON-PROBE: NOT DETECTED
LYMPHOCYTES NFR BLD: 0.53 K/UL (ref 1.5–4)
LYMPHOCYTES RELATIVE PERCENT: 8 % (ref 20–42)
M PNEUMO DNA NPH QL NAA+NON-PROBE: NOT DETECTED
MCH RBC QN AUTO: 28.8 PG (ref 26–35)
MCHC RBC AUTO-ENTMCNC: 31.6 G/DL (ref 32–34.5)
MCV RBC AUTO: 91.2 FL (ref 80–99.9)
MONOCYTES NFR BLD: 0 % (ref 2–12)
MONOCYTES NFR BLD: 0 K/UL (ref 0.1–0.95)
NEUTROPHILS NFR BLD: 92 % (ref 43–80)
NEUTS SEG NFR BLD: 6.27 K/UL (ref 1.8–7.3)
PLATELET # BLD AUTO: 165 K/UL (ref 130–450)
PMV BLD AUTO: 10.1 FL (ref 7–12)
POTASSIUM SERPL-SCNC: 4.8 MMOL/L (ref 3.5–5)
PROT SERPL-MCNC: 6.1 G/DL (ref 6.4–8.3)
RBC # BLD AUTO: 3.99 M/UL (ref 3.8–5.8)
RBC # BLD: NORMAL 10*6/UL
RSV RNA NPH QL NAA+NON-PROBE: NOT DETECTED
RV+EV RNA NPH QL NAA+NON-PROBE: NOT DETECTED
SARS-COV-2 RNA NPH QL NAA+NON-PROBE: NOT DETECTED
SODIUM SERPL-SCNC: 137 MMOL/L (ref 132–146)
SPECIMEN DESCRIPTION: NORMAL
TSH SERPL DL<=0.05 MIU/L-ACNC: 0.56 UIU/ML (ref 0.27–4.2)
WBC OTHER # BLD: 6.8 K/UL (ref 4.5–11.5)

## 2023-09-12 PROCEDURE — 2060000000 HC ICU INTERMEDIATE R&B

## 2023-09-12 PROCEDURE — 85025 COMPLETE CBC W/AUTO DIFF WBC: CPT

## 2023-09-12 PROCEDURE — 2700000000 HC OXYGEN THERAPY PER DAY

## 2023-09-12 PROCEDURE — 6370000000 HC RX 637 (ALT 250 FOR IP): Performed by: INTERNAL MEDICINE

## 2023-09-12 PROCEDURE — 84443 ASSAY THYROID STIM HORMONE: CPT

## 2023-09-12 PROCEDURE — 6360000002 HC RX W HCPCS: Performed by: INTERNAL MEDICINE

## 2023-09-12 PROCEDURE — 80053 COMPREHEN METABOLIC PANEL: CPT

## 2023-09-12 PROCEDURE — 97161 PT EVAL LOW COMPLEX 20 MIN: CPT

## 2023-09-12 PROCEDURE — 2580000003 HC RX 258: Performed by: INTERNAL MEDICINE

## 2023-09-12 PROCEDURE — 6370000000 HC RX 637 (ALT 250 FOR IP): Performed by: HOSPITALIST

## 2023-09-12 PROCEDURE — 97165 OT EVAL LOW COMPLEX 30 MIN: CPT

## 2023-09-12 RX ORDER — METOPROLOL SUCCINATE 50 MG/1
50 TABLET, EXTENDED RELEASE ORAL DAILY
Status: DISCONTINUED | OUTPATIENT
Start: 2023-09-12 | End: 2023-09-13 | Stop reason: HOSPADM

## 2023-09-12 RX ADMIN — HYDROCODONE BITARTRATE AND ACETAMINOPHEN 1 TABLET: 10; 325 TABLET ORAL at 08:54

## 2023-09-12 RX ADMIN — ALPRAZOLAM 0.5 MG: 0.25 TABLET ORAL at 08:54

## 2023-09-12 RX ADMIN — SODIUM CHLORIDE, PRESERVATIVE FREE 10 ML: 5 INJECTION INTRAVENOUS at 20:39

## 2023-09-12 RX ADMIN — METHYLPREDNISOLONE SODIUM SUCCINATE 40 MG: 40 INJECTION, POWDER, LYOPHILIZED, FOR SOLUTION INTRAMUSCULAR; INTRAVENOUS at 16:00

## 2023-09-12 RX ADMIN — QUETIAPINE FUMARATE 100 MG: 100 TABLET ORAL at 20:38

## 2023-09-12 RX ADMIN — ALPRAZOLAM 0.5 MG: 0.25 TABLET ORAL at 17:31

## 2023-09-12 RX ADMIN — SERTRALINE 100 MG: 100 TABLET, FILM COATED ORAL at 20:38

## 2023-09-12 RX ADMIN — LISINOPRIL 2.5 MG: 2.5 TABLET ORAL at 08:40

## 2023-09-12 RX ADMIN — METHYLPREDNISOLONE SODIUM SUCCINATE 40 MG: 40 INJECTION, POWDER, LYOPHILIZED, FOR SOLUTION INTRAMUSCULAR; INTRAVENOUS at 08:41

## 2023-09-12 RX ADMIN — Medication 3 MG: at 20:38

## 2023-09-12 RX ADMIN — PANTOPRAZOLE SODIUM 20 MG: 20 TABLET, DELAYED RELEASE ORAL at 08:40

## 2023-09-12 RX ADMIN — MELOXICAM 15 MG: 7.5 TABLET ORAL at 08:40

## 2023-09-12 RX ADMIN — METOPROLOL SUCCINATE 50 MG: 50 TABLET, EXTENDED RELEASE ORAL at 08:41

## 2023-09-12 RX ADMIN — APIXABAN 5 MG: 5 TABLET, FILM COATED ORAL at 08:40

## 2023-09-12 RX ADMIN — SODIUM CHLORIDE, PRESERVATIVE FREE 10 ML: 5 INJECTION INTRAVENOUS at 08:42

## 2023-09-12 RX ADMIN — METHYLPREDNISOLONE SODIUM SUCCINATE 40 MG: 40 INJECTION, POWDER, LYOPHILIZED, FOR SOLUTION INTRAMUSCULAR; INTRAVENOUS at 00:42

## 2023-09-12 RX ADMIN — HYDROCODONE BITARTRATE AND ACETAMINOPHEN 1 TABLET: 10; 325 TABLET ORAL at 22:12

## 2023-09-12 RX ADMIN — HYDROCODONE BITARTRATE AND ACETAMINOPHEN 1 TABLET: 10; 325 TABLET ORAL at 16:00

## 2023-09-12 RX ADMIN — APIXABAN 5 MG: 5 TABLET, FILM COATED ORAL at 20:38

## 2023-09-12 RX ADMIN — METHYLPREDNISOLONE SODIUM SUCCINATE 40 MG: 40 INJECTION, POWDER, LYOPHILIZED, FOR SOLUTION INTRAMUSCULAR; INTRAVENOUS at 22:12

## 2023-09-12 ASSESSMENT — PAIN DESCRIPTION - ORIENTATION
ORIENTATION: LOWER
ORIENTATION: LOWER

## 2023-09-12 ASSESSMENT — PAIN DESCRIPTION - DESCRIPTORS
DESCRIPTORS: ACHING;DISCOMFORT
DESCRIPTORS: ACHING;DISCOMFORT;SHARP

## 2023-09-12 ASSESSMENT — PAIN DESCRIPTION - PAIN TYPE: TYPE: CHRONIC PAIN

## 2023-09-12 ASSESSMENT — PAIN - FUNCTIONAL ASSESSMENT
PAIN_FUNCTIONAL_ASSESSMENT: ACTIVITIES ARE NOT PREVENTED
PAIN_FUNCTIONAL_ASSESSMENT: ACTIVITIES ARE NOT PREVENTED

## 2023-09-12 ASSESSMENT — PAIN SCALES - GENERAL
PAINLEVEL_OUTOF10: 8

## 2023-09-12 ASSESSMENT — PAIN DESCRIPTION - ONSET: ONSET: ON-GOING

## 2023-09-12 ASSESSMENT — PAIN DESCRIPTION - LOCATION
LOCATION: BACK
LOCATION: BACK

## 2023-09-12 ASSESSMENT — PAIN DESCRIPTION - FREQUENCY: FREQUENCY: CONTINUOUS

## 2023-09-12 NOTE — ACP (ADVANCE CARE PLANNING)
Advance Care Planning   Healthcare Decision Maker:    Primary Decision Maker: Braden Dominguez - [phone]      Today we documented Decision Maker(s) consistent with Legal Next of Kin hierarchy.

## 2023-09-12 NOTE — CONSULTS
beta-blocker    #4 COPD exacerbation  -Management as per primary team.  Currently on IV prednisone. If bronchodilators required, would use Xopenex in order to minimize heart rate exacerbation. #4 history of moderate tricuspid valve regurgitation with moderate pulmonary hypertension and mild RV dysfunction, normal LVEF. Likely WHO class III. No need to repeat echo.   Currently euvolemic            Electronically signed by Jennifer Srivastava MD on 9/12/2023 at 6:14 AM

## 2023-09-13 VITALS
HEART RATE: 86 BPM | HEIGHT: 70 IN | OXYGEN SATURATION: 94 % | DIASTOLIC BLOOD PRESSURE: 71 MMHG | BODY MASS INDEX: 27.33 KG/M2 | RESPIRATION RATE: 16 BRPM | SYSTOLIC BLOOD PRESSURE: 138 MMHG | TEMPERATURE: 98.7 F | WEIGHT: 190.9 LBS

## 2023-09-13 LAB
ALBUMIN SERPL-MCNC: 3.9 G/DL (ref 3.5–5.2)
ALP SERPL-CCNC: 129 U/L (ref 40–129)
ALT SERPL-CCNC: 15 U/L (ref 0–40)
ANION GAP SERPL CALCULATED.3IONS-SCNC: 7 MMOL/L (ref 7–16)
AST SERPL-CCNC: 16 U/L (ref 0–39)
BILIRUB SERPL-MCNC: 0.3 MG/DL (ref 0–1.2)
BUN SERPL-MCNC: 16 MG/DL (ref 6–23)
CALCIUM SERPL-MCNC: 9.5 MG/DL (ref 8.6–10.2)
CHLORIDE SERPL-SCNC: 97 MMOL/L (ref 98–107)
CO2 SERPL-SCNC: 35 MMOL/L (ref 22–29)
CREAT SERPL-MCNC: 0.5 MG/DL (ref 0.7–1.2)
GFR SERPL CREATININE-BSD FRML MDRD: >60 ML/MIN/1.73M2
GLUCOSE SERPL-MCNC: 139 MG/DL (ref 74–99)
POTASSIUM SERPL-SCNC: 4.6 MMOL/L (ref 3.5–5)
PROT SERPL-MCNC: 6.5 G/DL (ref 6.4–8.3)
SODIUM SERPL-SCNC: 139 MMOL/L (ref 132–146)

## 2023-09-13 PROCEDURE — 6370000000 HC RX 637 (ALT 250 FOR IP): Performed by: HOSPITALIST

## 2023-09-13 PROCEDURE — 2580000003 HC RX 258: Performed by: INTERNAL MEDICINE

## 2023-09-13 PROCEDURE — 36415 COLL VENOUS BLD VENIPUNCTURE: CPT

## 2023-09-13 PROCEDURE — 6360000002 HC RX W HCPCS: Performed by: INTERNAL MEDICINE

## 2023-09-13 PROCEDURE — 80053 COMPREHEN METABOLIC PANEL: CPT

## 2023-09-13 PROCEDURE — 2700000000 HC OXYGEN THERAPY PER DAY

## 2023-09-13 PROCEDURE — 6370000000 HC RX 637 (ALT 250 FOR IP): Performed by: INTERNAL MEDICINE

## 2023-09-13 RX ORDER — PREDNISONE 10 MG/1
TABLET ORAL
Qty: 30 TABLET | Refills: 0 | Status: SHIPPED | OUTPATIENT
Start: 2023-09-13 | End: 2023-09-23

## 2023-09-13 RX ORDER — ALBUTEROL SULFATE 90 UG/1
2 AEROSOL, METERED RESPIRATORY (INHALATION) EVERY 6 HOURS PRN
Qty: 18 G | Refills: 0 | Status: SHIPPED | OUTPATIENT
Start: 2023-09-13

## 2023-09-13 RX ORDER — AZITHROMYCIN 250 MG/1
TABLET, FILM COATED ORAL
Qty: 6 TABLET | Refills: 0 | Status: SHIPPED | OUTPATIENT
Start: 2023-09-13 | End: 2023-09-23

## 2023-09-13 RX ORDER — IPRATROPIUM BROMIDE AND ALBUTEROL SULFATE 2.5; .5 MG/3ML; MG/3ML
3 SOLUTION RESPIRATORY (INHALATION)
Qty: 360 ML | Refills: 0 | Status: SHIPPED | OUTPATIENT
Start: 2023-09-13

## 2023-09-13 RX ORDER — METOPROLOL SUCCINATE 50 MG/1
50 TABLET, EXTENDED RELEASE ORAL DAILY
Qty: 30 TABLET | Refills: 0 | Status: SHIPPED | OUTPATIENT
Start: 2023-09-14

## 2023-09-13 RX ADMIN — METOPROLOL SUCCINATE 50 MG: 50 TABLET, EXTENDED RELEASE ORAL at 08:25

## 2023-09-13 RX ADMIN — ALPRAZOLAM 0.5 MG: 0.25 TABLET ORAL at 07:21

## 2023-09-13 RX ADMIN — LISINOPRIL 2.5 MG: 2.5 TABLET ORAL at 08:27

## 2023-09-13 RX ADMIN — APIXABAN 5 MG: 5 TABLET, FILM COATED ORAL at 08:25

## 2023-09-13 RX ADMIN — SODIUM CHLORIDE, PRESERVATIVE FREE 10 ML: 5 INJECTION INTRAVENOUS at 08:28

## 2023-09-13 RX ADMIN — MELOXICAM 15 MG: 7.5 TABLET ORAL at 08:26

## 2023-09-13 RX ADMIN — METHYLPREDNISOLONE SODIUM SUCCINATE 40 MG: 40 INJECTION, POWDER, LYOPHILIZED, FOR SOLUTION INTRAMUSCULAR; INTRAVENOUS at 08:27

## 2023-09-13 RX ADMIN — HYDROCODONE BITARTRATE AND ACETAMINOPHEN 1 TABLET: 10; 325 TABLET ORAL at 04:23

## 2023-09-13 RX ADMIN — HYDROCODONE BITARTRATE AND ACETAMINOPHEN 1 TABLET: 10; 325 TABLET ORAL at 10:44

## 2023-09-13 RX ADMIN — PANTOPRAZOLE SODIUM 20 MG: 20 TABLET, DELAYED RELEASE ORAL at 08:27

## 2023-09-13 ASSESSMENT — PAIN SCALES - GENERAL
PAINLEVEL_OUTOF10: 5
PAINLEVEL_OUTOF10: 7
PAINLEVEL_OUTOF10: 8

## 2023-09-13 ASSESSMENT — PAIN DESCRIPTION - DESCRIPTORS
DESCRIPTORS: ACHING
DESCRIPTORS: ACHING;DISCOMFORT;SORE
DESCRIPTORS: ACHING

## 2023-09-13 ASSESSMENT — PAIN DESCRIPTION - LOCATION
LOCATION: BACK

## 2023-09-13 ASSESSMENT — PAIN - FUNCTIONAL ASSESSMENT
PAIN_FUNCTIONAL_ASSESSMENT: PREVENTS OR INTERFERES SOME ACTIVE ACTIVITIES AND ADLS
PAIN_FUNCTIONAL_ASSESSMENT: ACTIVITIES ARE NOT PREVENTED

## 2023-09-13 NOTE — PROGRESS NOTES
4 Eyes Skin Assessment     NAME:  Chad Cruz  YOB: 1955  MEDICAL RECORD NUMBER:  [id number]    The patient is being assessed for  Admission    I agree that at least one RN has performed a thorough Head to Toe Skin Assessment on the patient. ALL assessment sites listed below have been assessed. Areas assessed by both nurses:    Head, Face, Ears, Shoulders, Back, Chest, Arms, Elbows, Hands, Sacrum. Buttock, Coccyx, Ischium, Legs. Feet and Heels, and Under Medical Devices         Does the Patient have a Wound?  No noted wound(s)       George Prevention initiated by RN: No  Wound Care Orders initiated by RN: No    Pressure Injury (Stage 3,4, Unstageable, DTI, NWPT, and Complex wounds) if present, place Wound referral order by RN under : No    New Ostomies, if present place, Ostomy referral order under : No     Nurse 1 eSignature: Electronically signed by Stanley Arguello RN on 9/11/23 at 10:01 PM EDT    **SHARE this note so that the co-signing nurse can place an eSignature**    Nurse 2 eSignature: Electronically signed by Delvis Chavez RN on 9/11/23 at 10:02 PM EDT
CLINICAL PHARMACY NOTE: MEDS TO BEDS    Total # of Prescriptions Filled: 4   The following medications were delivered to the patient:  Ventolin hfa 108  Azithromycin 250 mg  Prednisone 10 mg  Metoprolol succinate 50 mg    Additional Documentation:duoneb transferred per patient request (not in stock) to [facility]
Cardizem drip stopped at 1015. Patient had run of PAT at 168 for 12 seconds and then back to SR at 1304.     Electronically signed by Melisa Reid RN on 9/12/2023 at 1:13 PM
Internal Medicine Progress Note    Patient's name: Kiran Bolivar  : 1955  Chief complaints (on day of admission): Shortness of Breath (SOB, cough, intermittent CP x 1 week. C/o \"panic attacks\". O2 4L NC at home for COPD. Pulse ox 94% on 4L at pivot. )  Admission date: 2023  Date of service: 2023   Room: [facility]  Primary care physician: Oziel Ponce MD  Reason for visit: Follow-up for COPD and afib    Subjective  Vasile Bourne was seen and examined. He feels better and at her baseline this morning. No palpitations or chest pain. He is in sinus rhythm after increment of his metoprolol. He continues to be on home oxygen at 4 L by nasal cannula. Review of Systems  There are no new complaints of chest pain, shortness of breath, abdominal pain, nausea, vomiting, diarrhea, constipation.     Hospital Medications  Current Facility-Administered Medications   Medication Dose Route Frequency Provider Last Rate Last Admin    metoprolol succinate (TOPROL XL) extended release tablet 50 mg  50 mg Oral Daily David Williamson MD   50 mg at 23 0825    fluticasone-umeclidin-vilant (TRELEGY ELLIPTA) 100-62.5-25 MCG/ACT inhaler 1 puff (Patient Supplied)  1 puff Inhalation Daily Delores Daily MD   1 puff at 23 0826    nicotine (NICODERM CQ) 7 MG/24HR 1 patch  1 patch TransDERmal Daily Delores Daily MD   1 patch at 23 4469    sodium chloride flush 0.9 % injection 10 mL  10 mL IntraVENous 2 times per day Sathish Cline, DO   10 mL at 23 8157    sodium chloride flush 0.9 % injection 10 mL  10 mL IntraVENous PRN Sathish E Volino, DO        0.9 % sodium chloride infusion   IntraVENous PRN Sathish FERNANDEZ Volino, DO        potassium chloride (KLOR-CON M) extended release tablet 40 mEq  40 mEq Oral PRN Sathish E Volino, DO        Or    potassium bicarb-citric acid (EFFER-K) effervescent tablet 40 mEq  40 mEq Oral PRN Sathish Cline, DO        Or    potassium chloride 10 mEq/100 mL IVPB
Matthieu Serve to Dr. Watson Ba with the following message: Patient is refusing offered respiratory treatments. Only wants to use home Trelegy. Patient's wife has brought it in. Okay to use?     Electronically signed by Denisha Garcia RN on 9/12/2023 at 10:22 AM
OCCUPATIONAL THERAPY INITIAL EVALUATION    [address]   [facility], [address] [place] [address] Drive        RCX:                                                  Patient Name: Rebeca Cheatham    MRN: [id number]    : 1955    Room: [address] [place] [address]     Evaluating OT: Ba Felder OTR/L #[id number]    Referring Provider: Avis Irwin DO     Specific Provider Orders/Date: OT Eval and Treat, 23     Diagnosis:   1. Atrial fibrillation with rapid ventricular response ([address])    2.  Acute exacerbation of chronic obstructive pulmonary disease (COPD) ([address])         Surgery: None       Pertinent Medical History: anxiety       Precautions:  Fall Risk, O2, monitor pulse ox      Assessment of current deficits    [x] Functional mobility  [x]ADLs  [x] Strength               []Cognition    [x] Functional transfers   [x] IADLs         [] Safety Awareness   [x]Endurance    [] Fine Coordination              [] Balance      [] Vision/perception   []Sensation     []Gross Motor Coordination  [] ROM  [] Delirium                   [] Motor Control     OT PLAN OF CARE   OT POC based on physician orders, patient diagnosis and results of clinical assessment    Frequency/Duration 1-3 days/wk for 2 weeks PRN     Specific OT Treatment Interventions to include:   * Instruction/training on adapted ADL techniques and AE recommendations to increase functional independence within precautions       * Training on energy conservation strategies, correct breathing pattern and techniques to improve independence/tolerance for self-care routine  * Functional transfer/mobility training/DME recommendations for increased independence, safety, and fall prevention  * Patient/Family education to increase follow through with safety techniques and functional independence  * Recommendation of environmental modifications for increased safety with functional transfers/mobility and ADLs  * Therapeutic exercise to
Patient has had several runs of PAT that last for a few seconds and then he returns to NSR.     Electronically signed by Vinay Berry RN on 9/12/2023 at 6:47 PM
Patient having several runs of PAT lasting a few seconds, returns to NSR.
Physical Therapy  Facility/Department: [facility]  Physical Therapy Initial Assessment    Name: Chad Cruz  : 1955  MRN: [id number]  Date of Service: 2023    Patient Diagnosis(es): The primary encounter diagnosis was Atrial fibrillation with rapid ventricular response ([address]). A diagnosis of Acute exacerbation of chronic obstructive pulmonary disease (COPD) ([address]) was also pertinent to this visit. Past Medical History:  has a past medical history of CAD (coronary artery disease), COPD (chronic obstructive pulmonary disease) (HCC), Decreased dorsalis pedis pulse, Depression, Enlarged prostate without lower urinary tract symptoms (luts), Gallstone, Hypertension, Leg swelling, Lymphedema of both lower extremities, and Vitamin D deficiency. Past Surgical History:  has a past surgical history that includes Cardiac surgery; Cholecystectomy, laparoscopic (N/A, 2019); Colonoscopy; hernia repair; ERCP (N/A, 10/7/2019); and ERCP (N/A, 10/9/2019). Referring provider:  Cookie Brittle, MD    PT Order:  PT eval and treat     Evaluating PT:  Ania Augustin PT, DPT PT [id number]    Room #:  2310/3975-S  Diagnosis:  Acute exacerbation of chronic obstructive pulmonary disease (COPD) ([address]) [J44.1]  Atrial fibrillation with rapid ventricular response ([address]) [I48.91]  Atrial fibrillation with RVR ([address]) [I48.91]  Precautions:  O2  Equipment Needs:  none    SUBJECTIVE:    Pt lives with his wife in a 1 story home with 2 stairs to enter and 0 rail. Bed and bath is on first floor. Pt ambulated with no device PTA. OBJECTIVE:   Initial Evaluation  Date:    Was pt agreeable to Eval/treatment? yes   Does pt have pain?  None reported   Bed Mobility  Rolling: independent  Supine to sit: independent  Sit to supine: independent  Scooting: independent   Transfers Sit to stand: independent  Stand to sit: independent  Stand pivot: independent   Ambulation    70 feet with no device independent    Stair negotiation: ascended
stimulation. #4 history of moderate tricuspid valve regurgitation with moderate pulmonary hypertension and mild RV dysfunction, normal LVEF. Likely WHO class III. No need to repeat echo.   Currently euvolemic    Home when okay with primary team.  Office will arrange follow-up                Electronically signed by Thea Luciano MD on 9/13/2023 at 5:38 AM

## 2023-09-13 NOTE — DISCHARGE SUMMARY
solution  Commonly known as: DUONEB  Inhale 3 mLs into the lungs every 4 hours (while awake)  What changed: when to take this     metoprolol succinate 50 MG extended release tablet  Commonly known as: TOPROL XL  Take 1 tablet by mouth daily  What changed:   medication strength  how much to take     sertraline 100 MG tablet  Commonly known as: ZOLOFT  TAKE ONE AND ONE-HALF TABLETS DAILY  What changed: See the new instructions. CONTINUE taking these medications      ALPRAZolam 0.5 MG tablet  Commonly known as: XANAX     apixaban 5 MG Tabs tablet  Commonly known as: ELIQUIS     aspirin 81 MG tablet  Take 1 tablet by mouth daily DO NOT TAKE ASPIRIN FOR 1 WEEK     HYDROcodone-acetaminophen  MG per tablet  Commonly known as: NORCO     lisinopril 2.5 MG tablet  Commonly known as: PRINIVIL;ZESTRIL  Take 1 tablet by mouth daily     meloxicam 15 MG tablet  Commonly known as: MOBIC  Take one tablet daily     nitroGLYCERIN 0.4 MG SL tablet  Commonly known as: NITROSTAT  Place 1 tablet under the tongue every 5 minutes as needed for Chest pain up to max of 3 total doses. If no relief after 1 dose, call 911.      QUEtiapine 100 MG tablet  Commonly known as: SEROQUEL     sildenafil 100 MG tablet  Commonly known as: VIAGRA     Trelegy Ellipta 100-62.5-25 MCG/ACT Aepb inhaler  Generic drug: fluticasone-umeclidin-vilant  USE 1 INHALATION BY MOUTH ONCE  DAILY AT THE SAME TIME EACH DAY            STOP taking these medications      pantoprazole 20 MG tablet  Commonly known as: Protonix               Where to Get Your Medications        These medications were sent to [address], [address], [address] [place] [address]      Phone: [phone]   albuterol sulfate  (90 Base) MCG/ACT inhaler  azithromycin 250 MG tablet  ipratropium 0.5 mg-albuterol 2.5 mg 0.5-2.5 (3) MG/3ML Soln nebulizer solution  metoprolol succinate 50 MG extended release

## 2023-09-13 NOTE — CARE COORDINATION
Social work / Discharge Planning:         Social work met with patient and his wife at bedside. Patient is currently requiring 4 liters of oxygen which is his baseline. Home oxygen provided by [facility].    He has a home nebulizer. Patient anticipates discharge home with no needs.      Electronically signed by SANDRA Arreguin on 9/13/2023 at 10:10 AM

## 2023-09-13 NOTE — PLAN OF CARE
Problem: Discharge Planning  Goal: Discharge to home or other facility with appropriate resources  Outcome: Progressing  Flowsheets (Taken 9/12/2023 1159)  Discharge to home or other facility with appropriate resources:   Identify barriers to discharge with patient and caregiver   Arrange for needed discharge resources and transportation as appropriate   Identify discharge learning needs (meds, wound care, etc)     Problem: Pain  Goal: Verbalizes/displays adequate comfort level or baseline comfort level  Outcome: Progressing     Problem: Safety - Adult  Goal: Free from fall injury  Outcome: Progressing

## 2024-01-03 PROBLEM — I48.0 PAROXYSMAL ATRIAL FIBRILLATION (HCC): Status: ACTIVE | Noted: 2023-09-11

## 2025-03-05 ENCOUNTER — APPOINTMENT (OUTPATIENT)
Dept: GENERAL RADIOLOGY | Age: 70
DRG: 308 | End: 2025-03-05
Payer: MEDICARE

## 2025-03-05 ENCOUNTER — HOSPITAL ENCOUNTER (INPATIENT)
Age: 70
LOS: 2 days | Discharge: HOME | DRG: 308 | End: 2025-03-07
Attending: EMERGENCY MEDICINE | Admitting: INTERNAL MEDICINE
Payer: MEDICARE

## 2025-03-05 DIAGNOSIS — I48.0 PAROXYSMAL ATRIAL FIBRILLATION (HCC): ICD-10-CM

## 2025-03-05 DIAGNOSIS — R91.8 INFILTRATE OF LEFT LUNG PRESENT ON CHEST X-RAY: ICD-10-CM

## 2025-03-05 DIAGNOSIS — R79.89 ELEVATED LFTS: ICD-10-CM

## 2025-03-05 DIAGNOSIS — I48.92 FLUTTER-FIBRILLATION (HCC): Primary | ICD-10-CM

## 2025-03-05 DIAGNOSIS — I48.91 FLUTTER-FIBRILLATION (HCC): Primary | ICD-10-CM

## 2025-03-05 LAB
ALBUMIN SERPL-MCNC: 3.5 G/DL (ref 3.5–5.2)
ALP SERPL-CCNC: 125 U/L (ref 40–129)
ALT SERPL-CCNC: 43 U/L (ref 0–40)
ANION GAP SERPL CALCULATED.3IONS-SCNC: 11 MMOL/L (ref 7–16)
AST SERPL-CCNC: 48 U/L (ref 0–39)
BASOPHILS # BLD: 0.03 K/UL (ref 0–0.2)
BASOPHILS NFR BLD: 0 % (ref 0–2)
BILIRUB SERPL-MCNC: 0.7 MG/DL (ref 0–1.2)
BUN SERPL-MCNC: 19 MG/DL (ref 6–23)
CALCIUM SERPL-MCNC: 9.3 MG/DL (ref 8.6–10.2)
CHLORIDE SERPL-SCNC: 87 MMOL/L (ref 98–107)
CO2 SERPL-SCNC: 36 MMOL/L (ref 22–29)
CREAT SERPL-MCNC: 0.6 MG/DL (ref 0.7–1.2)
D-DIMER QUANTITATIVE: 218 NG/ML DDU (ref 0–230)
EKG ATRIAL RATE: 161 BPM
EKG Q-T INTERVAL: 266 MS
EKG QRS DURATION: 134 MS
EKG QTC CALCULATION (BAZETT): 435 MS
EKG R AXIS: 46 DEGREES
EKG T AXIS: -2 DEGREES
EKG VENTRICULAR RATE: 161 BPM
EOSINOPHIL # BLD: 0.01 K/UL (ref 0.05–0.5)
EOSINOPHILS RELATIVE PERCENT: 0 % (ref 0–6)
ERYTHROCYTE [DISTWIDTH] IN BLOOD BY AUTOMATED COUNT: 12.2 % (ref 11.5–15)
GFR, ESTIMATED: >90 ML/MIN/1.73M2
GLUCOSE SERPL-MCNC: 190 MG/DL (ref 74–99)
HCT VFR BLD AUTO: 44.1 % (ref 37–54)
HGB BLD-MCNC: 13.9 G/DL (ref 12.5–16.5)
IMM GRANULOCYTES # BLD AUTO: 0.23 K/UL (ref 0–0.58)
IMM GRANULOCYTES NFR BLD: 2 % (ref 0–5)
INFLUENZA A BY PCR: NOT DETECTED
INFLUENZA B BY PCR: NOT DETECTED
LYMPHOCYTES NFR BLD: 1.25 K/UL (ref 1.5–4)
LYMPHOCYTES RELATIVE PERCENT: 9 % (ref 20–42)
MCH RBC QN AUTO: 27.6 PG (ref 26–35)
MCHC RBC AUTO-ENTMCNC: 31.5 G/DL (ref 32–34.5)
MCV RBC AUTO: 87.7 FL (ref 80–99.9)
MONOCYTES NFR BLD: 1.08 K/UL (ref 0.1–0.95)
MONOCYTES NFR BLD: 8 % (ref 2–12)
NEUTROPHILS NFR BLD: 82 % (ref 43–80)
NEUTS SEG NFR BLD: 11.52 K/UL (ref 1.8–7.3)
PLATELET # BLD AUTO: 353 K/UL (ref 130–450)
PMV BLD AUTO: 10.4 FL (ref 7–12)
POTASSIUM SERPL-SCNC: 3.6 MMOL/L (ref 3.5–5)
PROT SERPL-MCNC: 7.4 G/DL (ref 6.4–8.3)
RBC # BLD AUTO: 5.03 M/UL (ref 3.8–5.8)
SARS-COV-2 RDRP RESP QL NAA+PROBE: NOT DETECTED
SODIUM SERPL-SCNC: 134 MMOL/L (ref 132–146)
SPECIMEN DESCRIPTION: NORMAL
TROPONIN I SERPL HS-MCNC: 13 NG/L (ref 0–11)
TROPONIN I SERPL HS-MCNC: 9 NG/L (ref 0–11)
WBC OTHER # BLD: 14.1 K/UL (ref 4.5–11.5)

## 2025-03-05 PROCEDURE — 85379 FIBRIN DEGRADATION QUANT: CPT

## 2025-03-05 PROCEDURE — 80053 COMPREHEN METABOLIC PANEL: CPT

## 2025-03-05 PROCEDURE — 2500000003 HC RX 250 WO HCPCS: Performed by: PHYSICIAN ASSISTANT

## 2025-03-05 PROCEDURE — 84145 PROCALCITONIN (PCT): CPT

## 2025-03-05 PROCEDURE — 85025 COMPLETE CBC W/AUTO DIFF WBC: CPT

## 2025-03-05 PROCEDURE — 6360000002 HC RX W HCPCS

## 2025-03-05 PROCEDURE — 93010 ELECTROCARDIOGRAM REPORT: CPT | Performed by: INTERNAL MEDICINE

## 2025-03-05 PROCEDURE — 6360000002 HC RX W HCPCS: Performed by: EMERGENCY MEDICINE

## 2025-03-05 PROCEDURE — 86140 C-REACTIVE PROTEIN: CPT

## 2025-03-05 PROCEDURE — 85652 RBC SED RATE AUTOMATED: CPT

## 2025-03-05 PROCEDURE — 87635 SARS-COV-2 COVID-19 AMP PRB: CPT

## 2025-03-05 PROCEDURE — 99285 EMERGENCY DEPT VISIT HI MDM: CPT

## 2025-03-05 PROCEDURE — 2060000000 HC ICU INTERMEDIATE R&B

## 2025-03-05 PROCEDURE — 87502 INFLUENZA DNA AMP PROBE: CPT

## 2025-03-05 PROCEDURE — 6370000000 HC RX 637 (ALT 250 FOR IP)

## 2025-03-05 PROCEDURE — 84484 ASSAY OF TROPONIN QUANT: CPT

## 2025-03-05 PROCEDURE — 2580000003 HC RX 258

## 2025-03-05 PROCEDURE — 6370000000 HC RX 637 (ALT 250 FOR IP): Performed by: PHYSICIAN ASSISTANT

## 2025-03-05 PROCEDURE — 2500000003 HC RX 250 WO HCPCS

## 2025-03-05 PROCEDURE — 93005 ELECTROCARDIOGRAM TRACING: CPT | Performed by: EMERGENCY MEDICINE

## 2025-03-05 PROCEDURE — 71045 X-RAY EXAM CHEST 1 VIEW: CPT

## 2025-03-05 PROCEDURE — 94640 AIRWAY INHALATION TREATMENT: CPT

## 2025-03-05 RX ORDER — DOXYCYCLINE 100 MG/1
100 CAPSULE ORAL EVERY 12 HOURS SCHEDULED
Status: DISCONTINUED | OUTPATIENT
Start: 2025-03-06 | End: 2025-03-07

## 2025-03-05 RX ORDER — ACETAMINOPHEN 325 MG/1
650 TABLET ORAL EVERY 6 HOURS PRN
Status: DISCONTINUED | OUTPATIENT
Start: 2025-03-05 | End: 2025-03-07 | Stop reason: HOSPADM

## 2025-03-05 RX ORDER — QUETIAPINE FUMARATE 100 MG/1
100 TABLET, FILM COATED ORAL NIGHTLY
Status: DISCONTINUED | OUTPATIENT
Start: 2025-03-05 | End: 2025-03-06

## 2025-03-05 RX ORDER — ASPIRIN 81 MG/1
324 TABLET, CHEWABLE ORAL ONCE
Status: COMPLETED | OUTPATIENT
Start: 2025-03-05 | End: 2025-03-05

## 2025-03-05 RX ORDER — ASPIRIN 81 MG/1
81 TABLET, CHEWABLE ORAL DAILY
Status: DISCONTINUED | OUTPATIENT
Start: 2025-03-06 | End: 2025-03-07 | Stop reason: HOSPADM

## 2025-03-05 RX ORDER — SODIUM CHLORIDE 0.9 % (FLUSH) 0.9 %
10 SYRINGE (ML) INJECTION EVERY 12 HOURS SCHEDULED
Status: DISCONTINUED | OUTPATIENT
Start: 2025-03-05 | End: 2025-03-07 | Stop reason: HOSPADM

## 2025-03-05 RX ORDER — DIGOXIN 0.25 MG/ML
250 INJECTION INTRAMUSCULAR; INTRAVENOUS ONCE
Status: COMPLETED | OUTPATIENT
Start: 2025-03-05 | End: 2025-03-05

## 2025-03-05 RX ORDER — HYDROCODONE BITARTRATE AND ACETAMINOPHEN 10; 325 MG/1; MG/1
1 TABLET ORAL EVERY 6 HOURS PRN
Status: DISCONTINUED | OUTPATIENT
Start: 2025-03-05 | End: 2025-03-07 | Stop reason: HOSPADM

## 2025-03-05 RX ORDER — BUDESONIDE 0.25 MG/2ML
0.25 INHALANT ORAL
Status: DISCONTINUED | OUTPATIENT
Start: 2025-03-06 | End: 2025-03-06

## 2025-03-05 RX ORDER — POTASSIUM CHLORIDE 7.45 MG/ML
10 INJECTION INTRAVENOUS PRN
Status: DISCONTINUED | OUTPATIENT
Start: 2025-03-05 | End: 2025-03-07 | Stop reason: HOSPADM

## 2025-03-05 RX ORDER — SODIUM CHLORIDE 9 MG/ML
INJECTION, SOLUTION INTRAVENOUS PRN
Status: DISCONTINUED | OUTPATIENT
Start: 2025-03-05 | End: 2025-03-07 | Stop reason: HOSPADM

## 2025-03-05 RX ORDER — METOPROLOL SUCCINATE 50 MG/1
50 TABLET, EXTENDED RELEASE ORAL DAILY
Status: DISCONTINUED | OUTPATIENT
Start: 2025-03-06 | End: 2025-03-07 | Stop reason: HOSPADM

## 2025-03-05 RX ORDER — IPRATROPIUM BROMIDE AND ALBUTEROL SULFATE 2.5; .5 MG/3ML; MG/3ML
3 SOLUTION RESPIRATORY (INHALATION) ONCE
Status: COMPLETED | OUTPATIENT
Start: 2025-03-05 | End: 2025-03-05

## 2025-03-05 RX ORDER — DILTIAZEM HYDROCHLORIDE 5 MG/ML
15 INJECTION INTRAVENOUS ONCE
Status: COMPLETED | OUTPATIENT
Start: 2025-03-05 | End: 2025-03-05

## 2025-03-05 RX ORDER — ARFORMOTEROL TARTRATE 15 UG/2ML
15 SOLUTION RESPIRATORY (INHALATION)
Status: DISCONTINUED | OUTPATIENT
Start: 2025-03-06 | End: 2025-03-06

## 2025-03-05 RX ORDER — SERTRALINE HYDROCHLORIDE 100 MG/1
100 TABLET, FILM COATED ORAL DAILY
Status: DISCONTINUED | OUTPATIENT
Start: 2025-03-06 | End: 2025-03-07 | Stop reason: HOSPADM

## 2025-03-05 RX ORDER — ONDANSETRON 2 MG/ML
4 INJECTION INTRAMUSCULAR; INTRAVENOUS EVERY 6 HOURS PRN
Status: DISCONTINUED | OUTPATIENT
Start: 2025-03-05 | End: 2025-03-07 | Stop reason: HOSPADM

## 2025-03-05 RX ORDER — DILTIAZEM HYDROCHLORIDE 5 MG/ML
0.25 INJECTION INTRAVENOUS ONCE
Status: CANCELLED | OUTPATIENT
Start: 2025-03-05 | End: 2025-03-05

## 2025-03-05 RX ORDER — ENOXAPARIN SODIUM 100 MG/ML
40 INJECTION SUBCUTANEOUS DAILY
Status: DISCONTINUED | OUTPATIENT
Start: 2025-03-05 | End: 2025-03-05 | Stop reason: ALTCHOICE

## 2025-03-05 RX ORDER — POTASSIUM CHLORIDE 1500 MG/1
40 TABLET, EXTENDED RELEASE ORAL PRN
Status: DISCONTINUED | OUTPATIENT
Start: 2025-03-05 | End: 2025-03-07 | Stop reason: HOSPADM

## 2025-03-05 RX ORDER — SENNOSIDES A AND B 8.6 MG/1
1 TABLET, FILM COATED ORAL DAILY PRN
Status: DISCONTINUED | OUTPATIENT
Start: 2025-03-05 | End: 2025-03-07 | Stop reason: HOSPADM

## 2025-03-05 RX ORDER — DOXYCYCLINE 100 MG/1
100 CAPSULE ORAL ONCE
Status: COMPLETED | OUTPATIENT
Start: 2025-03-05 | End: 2025-03-05

## 2025-03-05 RX ORDER — ACETAMINOPHEN 650 MG/1
650 SUPPOSITORY RECTAL EVERY 6 HOURS PRN
Status: DISCONTINUED | OUTPATIENT
Start: 2025-03-05 | End: 2025-03-07 | Stop reason: HOSPADM

## 2025-03-05 RX ORDER — ONDANSETRON 4 MG/1
4 TABLET, ORALLY DISINTEGRATING ORAL EVERY 8 HOURS PRN
Status: DISCONTINUED | OUTPATIENT
Start: 2025-03-05 | End: 2025-03-07 | Stop reason: HOSPADM

## 2025-03-05 RX ORDER — MAGNESIUM SULFATE IN WATER 40 MG/ML
2000 INJECTION, SOLUTION INTRAVENOUS PRN
Status: DISCONTINUED | OUTPATIENT
Start: 2025-03-05 | End: 2025-03-07 | Stop reason: HOSPADM

## 2025-03-05 RX ORDER — METOPROLOL TARTRATE 1 MG/ML
5 INJECTION, SOLUTION INTRAVENOUS ONCE
Status: COMPLETED | OUTPATIENT
Start: 2025-03-06 | End: 2025-03-06

## 2025-03-05 RX ORDER — 0.9 % SODIUM CHLORIDE 0.9 %
1000 INTRAVENOUS SOLUTION INTRAVENOUS ONCE
Status: COMPLETED | OUTPATIENT
Start: 2025-03-05 | End: 2025-03-05

## 2025-03-05 RX ORDER — SODIUM CHLORIDE 0.9 % (FLUSH) 0.9 %
10 SYRINGE (ML) INJECTION PRN
Status: DISCONTINUED | OUTPATIENT
Start: 2025-03-05 | End: 2025-03-07 | Stop reason: HOSPADM

## 2025-03-05 RX ADMIN — IPRATROPIUM BROMIDE AND ALBUTEROL SULFATE 3 DOSE: .5; 3 SOLUTION RESPIRATORY (INHALATION) at 16:51

## 2025-03-05 RX ADMIN — ASPIRIN 81 MG CHEWABLE TABLET 324 MG: 81 TABLET CHEWABLE at 13:40

## 2025-03-05 RX ADMIN — QUETIAPINE FUMARATE 100 MG: 100 TABLET ORAL at 21:58

## 2025-03-05 RX ADMIN — DILTIAZEM HYDROCHLORIDE 2.5 MG/HR: 5 INJECTION, SOLUTION INTRAVENOUS at 15:11

## 2025-03-05 RX ADMIN — HYDROCODONE BITARTRATE AND ACETAMINOPHEN 1 TABLET: 10; 325 TABLET ORAL at 21:57

## 2025-03-05 RX ADMIN — SODIUM CHLORIDE, PRESERVATIVE FREE 10 ML: 5 INJECTION INTRAVENOUS at 21:57

## 2025-03-05 RX ADMIN — DILTIAZEM HYDROCHLORIDE 15 MG: 5 INJECTION, SOLUTION INTRAVENOUS at 15:00

## 2025-03-05 RX ADMIN — DOXYCYCLINE 100 MG: 100 CAPSULE ORAL at 15:10

## 2025-03-05 RX ADMIN — DIGOXIN 250 MCG: 0.25 INJECTION INTRAMUSCULAR; INTRAVENOUS at 20:54

## 2025-03-05 RX ADMIN — SODIUM CHLORIDE 1000 ML: 9 INJECTION, SOLUTION INTRAVENOUS at 13:45

## 2025-03-05 RX ADMIN — WATER 2000 MG: 1 INJECTION INTRAMUSCULAR; INTRAVENOUS; SUBCUTANEOUS at 15:00

## 2025-03-05 RX ADMIN — APIXABAN 5 MG: 5 TABLET, FILM COATED ORAL at 21:57

## 2025-03-05 ASSESSMENT — PAIN SCALES - GENERAL: PAINLEVEL_OUTOF10: 4

## 2025-03-05 ASSESSMENT — PAIN DESCRIPTION - LOCATION: LOCATION: BACK

## 2025-03-05 ASSESSMENT — PAIN DESCRIPTION - DESCRIPTORS: DESCRIPTORS: ACHING

## 2025-03-05 NOTE — CARE COORDINATION
Internal Medicine On-call Care Coordination Note    I was called by the ED physician because they recommended admission for this patient and I cover their PCP.  The history as I understand it after discussion with the ED physician is as follows:    Presents with shortness of breath, feeling unwell for the past week.   HR 150s on arrival, in Afib RVR  WBC 14.1  CXR showing hazy opacity  Started on cardizem gtt, HR improved to 100s  Given ceftriaxone, doxycycline  Decision made for admission    I placed admission orders.  Including:    Continue cardizem gtt  Cardiology consult  Ceftriaxone / doxycycline  CRP / procal  Urinary antigens    Dr. Cline or his coverage will see the patient tomorrow for H&P.    Electronically signed by Jose Shaw PA-C on 3/5/2025 at 3:36 PM

## 2025-03-05 NOTE — ED PROVIDER NOTES
Department of Emergency Medicine     Written by: Ambrocio Noyola MD  Patient Name: Waldo Soliman  Visit Date: 3/5/2025  1:16 PM  MRN: [id number]                   : 1955    ------------------------- CC-------------------------  Chief Complaint   Patient presents with    Shortness of Breath     Hx afib. In afib now 157 at pivot     ------------------------- HPI -------------------------    Waldo is a 69 y.o. year old male presents from home for shortness of breath and tachycardia.  Patient has history of A-fib currently on Eliquis and metoprolol ER, history of COPD, uses 5 L of oxygen at baseline.  For the past week, he has had fevers, mild productive cough of light sputum and chills which resolved. Reported  generalized feeling of not feeling well.  Today he has shortness of breath and felt palpitations.  He reports that he has missed a few days of his Eliquis and his metoprolol.  Denies chest pain, headaches, vision changes. Denies nausea, vomiting, abdominal pain, urinary symptoms.    There are wife at bedside. The history is provided by patient, who is felt to be a good historian.    Nursing notes were all reviewed and agreed with or any disagreements were addressed in the HPI.    REVIEW OF SYSTEMS:    Review of Systems:   Please see HPI above. All bolded are positive. All un-bolded are negative.  Constitutional Symptoms: fever, chills, fatigue, generalized weakness, diaphoresis, increase in thirst, loss of appetite  Eyes: vision change   Ears, Nose, Mouth, Throat: hearing loss, nasal congestion, sores in the mouth  Cardiovascular: chest pain, chest heaviness, palpitations  Respiratory: shortness of breath, wheezing, coughing  Gastrointestinal: abdominal pain, nausea, vomiting, diarrhea, constipation, melena, hematochezia, hematemesis  Genitourinary: dysuria, hematuria, increased frequency  Musculoskeletal: lower/upper extremity edema, myalgias, arthralgias, back pain, neck pain  Integumentary: rashes,

## 2025-03-06 LAB
ALBUMIN SERPL-MCNC: 3.1 G/DL (ref 3.5–5.2)
ALP SERPL-CCNC: 104 U/L (ref 40–129)
ALT SERPL-CCNC: 37 U/L (ref 0–40)
ANION GAP SERPL CALCULATED.3IONS-SCNC: 12 MMOL/L (ref 7–16)
AST SERPL-CCNC: 44 U/L (ref 0–39)
B PARAP IS1001 DNA NPH QL NAA+NON-PROBE: NOT DETECTED
B PERT DNA SPEC QL NAA+PROBE: NOT DETECTED
BASOPHILS # BLD: 0.02 K/UL (ref 0–0.2)
BASOPHILS NFR BLD: 0 % (ref 0–2)
BILIRUB SERPL-MCNC: 0.5 MG/DL (ref 0–1.2)
BNP SERPL-MCNC: 1040 PG/ML (ref 0–125)
BUN SERPL-MCNC: 18 MG/DL (ref 6–23)
C PNEUM DNA NPH QL NAA+NON-PROBE: NOT DETECTED
CALCIUM SERPL-MCNC: 8.4 MG/DL (ref 8.6–10.2)
CHLORIDE SERPL-SCNC: 93 MMOL/L (ref 98–107)
CO2 SERPL-SCNC: 32 MMOL/L (ref 22–29)
CREAT SERPL-MCNC: 0.4 MG/DL (ref 0.7–1.2)
CRP SERPL HS-MCNC: 67 MG/L (ref 0–5)
EKG ATRIAL RATE: 319 BPM
EKG P AXIS: -89 DEGREES
EKG Q-T INTERVAL: 350 MS
EKG QRS DURATION: 110 MS
EKG QTC CALCULATION (BAZETT): 471 MS
EKG R AXIS: 53 DEGREES
EKG T AXIS: 76 DEGREES
EKG VENTRICULAR RATE: 109 BPM
EOSINOPHIL # BLD: 0.01 K/UL (ref 0.05–0.5)
EOSINOPHILS RELATIVE PERCENT: 0 % (ref 0–6)
ERYTHROCYTE [DISTWIDTH] IN BLOOD BY AUTOMATED COUNT: 12.1 % (ref 11.5–15)
ERYTHROCYTE [SEDIMENTATION RATE] IN BLOOD BY WESTERGREN METHOD: 47 MM/HR (ref 0–15)
FLUBV RNA NPH QL NAA+NON-PROBE: NOT DETECTED
GFR, ESTIMATED: >90 ML/MIN/1.73M2
GLUCOSE SERPL-MCNC: 134 MG/DL (ref 74–99)
HADV DNA NPH QL NAA+NON-PROBE: NOT DETECTED
HCOV 229E RNA NPH QL NAA+NON-PROBE: NOT DETECTED
HCOV HKU1 RNA NPH QL NAA+NON-PROBE: NOT DETECTED
HCOV NL63 RNA NPH QL NAA+NON-PROBE: NOT DETECTED
HCOV OC43 RNA NPH QL NAA+NON-PROBE: NOT DETECTED
HCT VFR BLD AUTO: 35.4 % (ref 37–54)
HGB BLD-MCNC: 11.3 G/DL (ref 12.5–16.5)
HMPV RNA NPH QL NAA+NON-PROBE: NOT DETECTED
HPIV1 RNA NPH QL NAA+NON-PROBE: NOT DETECTED
HPIV2 RNA NPH QL NAA+NON-PROBE: NOT DETECTED
HPIV3 RNA NPH QL NAA+NON-PROBE: NOT DETECTED
HPIV4 RNA NPH QL NAA+NON-PROBE: NOT DETECTED
IMM GRANULOCYTES # BLD AUTO: 0.13 K/UL (ref 0–0.58)
IMM GRANULOCYTES NFR BLD: 1 % (ref 0–5)
INFLUENZA A (NO SUBTYPE) BY PCR: DETECTED
L PNEUMO1 AG UR QL IA.RAPID: NEGATIVE
LYMPHOCYTES NFR BLD: 1.08 K/UL (ref 1.5–4)
LYMPHOCYTES RELATIVE PERCENT: 11 % (ref 20–42)
M PNEUMO DNA NPH QL NAA+NON-PROBE: NOT DETECTED
MCH RBC QN AUTO: 27.7 PG (ref 26–35)
MCHC RBC AUTO-ENTMCNC: 31.9 G/DL (ref 32–34.5)
MCV RBC AUTO: 86.8 FL (ref 80–99.9)
MONOCYTES NFR BLD: 0.87 K/UL (ref 0.1–0.95)
MONOCYTES NFR BLD: 9 % (ref 2–12)
NEUTROPHILS NFR BLD: 79 % (ref 43–80)
NEUTS SEG NFR BLD: 7.92 K/UL (ref 1.8–7.3)
PLATELET # BLD AUTO: 243 K/UL (ref 130–450)
PMV BLD AUTO: 10.6 FL (ref 7–12)
POTASSIUM SERPL-SCNC: 4 MMOL/L (ref 3.5–5)
PROCALCITONIN SERPL-MCNC: 0.09 NG/ML (ref 0–0.08)
PROT SERPL-MCNC: 6.5 G/DL (ref 6.4–8.3)
RBC # BLD AUTO: 4.08 M/UL (ref 3.8–5.8)
RSV RNA NPH QL NAA+NON-PROBE: NOT DETECTED
RV+EV RNA NPH QL NAA+NON-PROBE: NOT DETECTED
S PNEUM AG SPEC QL: NEGATIVE
SARS-COV-2 RNA NPH QL NAA+NON-PROBE: NOT DETECTED
SODIUM SERPL-SCNC: 137 MMOL/L (ref 132–146)
SPECIMEN DESCRIPTION: ABNORMAL
TSH SERPL DL<=0.05 MIU/L-ACNC: 0.56 UIU/ML (ref 0.27–4.2)
WBC OTHER # BLD: 10 K/UL (ref 4.5–11.5)

## 2025-03-06 PROCEDURE — 6360000002 HC RX W HCPCS: Performed by: NURSE PRACTITIONER

## 2025-03-06 PROCEDURE — 0202U NFCT DS 22 TRGT SARS-COV-2: CPT

## 2025-03-06 PROCEDURE — 6370000000 HC RX 637 (ALT 250 FOR IP): Performed by: PHYSICIAN ASSISTANT

## 2025-03-06 PROCEDURE — 87449 NOS EACH ORGANISM AG IA: CPT

## 2025-03-06 PROCEDURE — 97530 THERAPEUTIC ACTIVITIES: CPT

## 2025-03-06 PROCEDURE — 2580000003 HC RX 258: Performed by: PHYSICIAN ASSISTANT

## 2025-03-06 PROCEDURE — 2700000000 HC OXYGEN THERAPY PER DAY

## 2025-03-06 PROCEDURE — 94669 MECHANICAL CHEST WALL OSCILL: CPT

## 2025-03-06 PROCEDURE — 80053 COMPREHEN METABOLIC PANEL: CPT

## 2025-03-06 PROCEDURE — APPSS180 APP SPLIT SHARED TIME > 60 MINUTES: Performed by: NURSE PRACTITIONER

## 2025-03-06 PROCEDURE — 97161 PT EVAL LOW COMPLEX 20 MIN: CPT

## 2025-03-06 PROCEDURE — 2060000000 HC ICU INTERMEDIATE R&B

## 2025-03-06 PROCEDURE — 6360000002 HC RX W HCPCS: Performed by: PHYSICIAN ASSISTANT

## 2025-03-06 PROCEDURE — 99223 1ST HOSP IP/OBS HIGH 75: CPT | Performed by: INTERNAL MEDICINE

## 2025-03-06 PROCEDURE — 84443 ASSAY THYROID STIM HORMONE: CPT

## 2025-03-06 PROCEDURE — 6370000000 HC RX 637 (ALT 250 FOR IP): Performed by: NURSE PRACTITIONER

## 2025-03-06 PROCEDURE — 87899 AGENT NOS ASSAY W/OPTIC: CPT

## 2025-03-06 PROCEDURE — 2500000003 HC RX 250 WO HCPCS: Performed by: PHYSICIAN ASSISTANT

## 2025-03-06 PROCEDURE — 6370000000 HC RX 637 (ALT 250 FOR IP): Performed by: INTERNAL MEDICINE

## 2025-03-06 PROCEDURE — 85025 COMPLETE CBC W/AUTO DIFF WBC: CPT

## 2025-03-06 PROCEDURE — 94640 AIRWAY INHALATION TREATMENT: CPT

## 2025-03-06 PROCEDURE — 93005 ELECTROCARDIOGRAM TRACING: CPT | Performed by: NURSE PRACTITIONER

## 2025-03-06 PROCEDURE — 2500000003 HC RX 250 WO HCPCS: Performed by: INTERNAL MEDICINE

## 2025-03-06 PROCEDURE — 83880 ASSAY OF NATRIURETIC PEPTIDE: CPT

## 2025-03-06 RX ORDER — IPRATROPIUM BROMIDE AND ALBUTEROL SULFATE 2.5; .5 MG/3ML; MG/3ML
1 SOLUTION RESPIRATORY (INHALATION)
Status: DISCONTINUED | OUTPATIENT
Start: 2025-03-06 | End: 2025-03-06

## 2025-03-06 RX ORDER — ALPRAZOLAM 0.25 MG
0.5 TABLET ORAL EVERY 8 HOURS PRN
Status: DISCONTINUED | OUTPATIENT
Start: 2025-03-06 | End: 2025-03-07 | Stop reason: HOSPADM

## 2025-03-06 RX ORDER — LEVALBUTEROL INHALATION SOLUTION 0.63 MG/3ML
0.63 SOLUTION RESPIRATORY (INHALATION) EVERY 6 HOURS
Status: DISCONTINUED | OUTPATIENT
Start: 2025-03-06 | End: 2025-03-07 | Stop reason: HOSPADM

## 2025-03-06 RX ORDER — ARFORMOTEROL TARTRATE 15 UG/2ML
15 SOLUTION RESPIRATORY (INHALATION)
Status: DISCONTINUED | OUTPATIENT
Start: 2025-03-06 | End: 2025-03-07 | Stop reason: HOSPADM

## 2025-03-06 RX ORDER — NICOTINE 21 MG/24HR
1 PATCH, TRANSDERMAL 24 HOURS TRANSDERMAL DAILY
Status: DISCONTINUED | OUTPATIENT
Start: 2025-03-06 | End: 2025-03-06

## 2025-03-06 RX ORDER — BUDESONIDE 0.5 MG/2ML
500 INHALANT ORAL
Status: DISCONTINUED | OUTPATIENT
Start: 2025-03-06 | End: 2025-03-07 | Stop reason: HOSPADM

## 2025-03-06 RX ORDER — OSELTAMIVIR PHOSPHATE 75 MG/1
75 CAPSULE ORAL 2 TIMES DAILY
Status: DISCONTINUED | OUTPATIENT
Start: 2025-03-06 | End: 2025-03-07 | Stop reason: HOSPADM

## 2025-03-06 RX ORDER — METHYLPREDNISOLONE SODIUM SUCCINATE 40 MG/ML
40 INJECTION INTRAMUSCULAR; INTRAVENOUS EVERY 12 HOURS
Status: COMPLETED | OUTPATIENT
Start: 2025-03-06 | End: 2025-03-07

## 2025-03-06 RX ORDER — GUAIFENESIN 400 MG/1
400 TABLET ORAL 3 TIMES DAILY
Status: DISCONTINUED | OUTPATIENT
Start: 2025-03-06 | End: 2025-03-07 | Stop reason: HOSPADM

## 2025-03-06 RX ADMIN — DILTIAZEM HYDROCHLORIDE 15 MG/HR: 5 INJECTION, SOLUTION INTRAVENOUS at 03:22

## 2025-03-06 RX ADMIN — GUAIFENESIN 400 MG: 400 TABLET ORAL at 08:40

## 2025-03-06 RX ADMIN — METOPROLOL SUCCINATE 50 MG: 50 TABLET, FILM COATED, EXTENDED RELEASE ORAL at 08:41

## 2025-03-06 RX ADMIN — WATER 1000 MG: 1 INJECTION INTRAMUSCULAR; INTRAVENOUS; SUBCUTANEOUS at 14:19

## 2025-03-06 RX ADMIN — LEVALBUTEROL HYDROCHLORIDE 0.63 MG: 0.63 SOLUTION RESPIRATORY (INHALATION) at 12:54

## 2025-03-06 RX ADMIN — SERTRALINE 100 MG: 100 TABLET, FILM COATED ORAL at 08:41

## 2025-03-06 RX ADMIN — IPRATROPIUM BROMIDE 0.5 MG: 0.5 SOLUTION RESPIRATORY (INHALATION) at 21:51

## 2025-03-06 RX ADMIN — BUDESONIDE INHALATION 500 MCG: 0.5 SUSPENSION RESPIRATORY (INHALATION) at 21:51

## 2025-03-06 RX ADMIN — BUDESONIDE INHALATION 500 MCG: 0.5 SUSPENSION RESPIRATORY (INHALATION) at 12:54

## 2025-03-06 RX ADMIN — LEVALBUTEROL HYDROCHLORIDE 0.63 MG: 0.63 SOLUTION RESPIRATORY (INHALATION) at 21:50

## 2025-03-06 RX ADMIN — DOXYCYCLINE HYCLATE 100 MG: 100 CAPSULE ORAL at 21:19

## 2025-03-06 RX ADMIN — ARFORMOTEROL TARTRATE 15 MCG: 15 SOLUTION RESPIRATORY (INHALATION) at 12:54

## 2025-03-06 RX ADMIN — DILTIAZEM HYDROCHLORIDE 12.5 MG/HR: 5 INJECTION, SOLUTION INTRAVENOUS at 15:22

## 2025-03-06 RX ADMIN — DOXYCYCLINE HYCLATE 100 MG: 100 CAPSULE ORAL at 06:02

## 2025-03-06 RX ADMIN — SODIUM CHLORIDE, PRESERVATIVE FREE 10 ML: 5 INJECTION INTRAVENOUS at 08:41

## 2025-03-06 RX ADMIN — OSELTAMIVIR PHOSPHATE 75 MG: 75 CAPSULE ORAL at 21:19

## 2025-03-06 RX ADMIN — GUAIFENESIN 400 MG: 400 TABLET ORAL at 14:19

## 2025-03-06 RX ADMIN — METHYLPREDNISOLONE SODIUM SUCCINATE 40 MG: 40 INJECTION INTRAMUSCULAR; INTRAVENOUS at 11:55

## 2025-03-06 RX ADMIN — IPRATROPIUM BROMIDE 0.5 MG: 0.5 SOLUTION RESPIRATORY (INHALATION) at 12:54

## 2025-03-06 RX ADMIN — ARFORMOTEROL TARTRATE 15 MCG: 15 SOLUTION RESPIRATORY (INHALATION) at 21:51

## 2025-03-06 RX ADMIN — SODIUM CHLORIDE: 9 INJECTION, SOLUTION INTRAVENOUS at 00:02

## 2025-03-06 RX ADMIN — ALPRAZOLAM 0.5 MG: 0.25 TABLET ORAL at 21:19

## 2025-03-06 RX ADMIN — ALPRAZOLAM 0.5 MG: 0.25 TABLET ORAL at 06:34

## 2025-03-06 RX ADMIN — SODIUM CHLORIDE, PRESERVATIVE FREE 10 ML: 5 INJECTION INTRAVENOUS at 21:20

## 2025-03-06 RX ADMIN — ASPIRIN 81 MG CHEWABLE TABLET 81 MG: 81 TABLET CHEWABLE at 08:41

## 2025-03-06 RX ADMIN — ONDANSETRON 4 MG: 2 INJECTION, SOLUTION INTRAMUSCULAR; INTRAVENOUS at 21:20

## 2025-03-06 RX ADMIN — APIXABAN 5 MG: 5 TABLET, FILM COATED ORAL at 21:19

## 2025-03-06 RX ADMIN — APIXABAN 5 MG: 5 TABLET, FILM COATED ORAL at 08:41

## 2025-03-06 RX ADMIN — DILTIAZEM HYDROCHLORIDE 15 MG/HR: 5 INJECTION, SOLUTION INTRAVENOUS at 09:30

## 2025-03-06 RX ADMIN — DILTIAZEM HYDROCHLORIDE 12.5 MG/HR: 5 INJECTION, SOLUTION INTRAVENOUS at 19:39

## 2025-03-06 RX ADMIN — GUAIFENESIN 400 MG: 400 TABLET ORAL at 21:19

## 2025-03-06 RX ADMIN — METHYLPREDNISOLONE SODIUM SUCCINATE 40 MG: 40 INJECTION INTRAMUSCULAR; INTRAVENOUS at 23:41

## 2025-03-06 RX ADMIN — METOPROLOL TARTRATE 5 MG: 5 INJECTION INTRAVENOUS at 00:02

## 2025-03-06 ASSESSMENT — PAIN SCALES - GENERAL: PAINLEVEL_OUTOF10: 0

## 2025-03-06 NOTE — PLAN OF CARE
Problem: Discharge Planning  Goal: Discharge to home or other facility with appropriate resources  3/6/2025 0947 by Madison Felix RN  Outcome: Not Progressing  3/6/2025 0050 by Howard Christy RN  Outcome: Progressing     Problem: Skin/Tissue Integrity  Goal: Skin integrity remains intact  Description: 1.  Monitor for areas of redness and/or skin breakdown  2.  Assess vascular access sites hourly  3.  Every 4-6 hours minimum:  Change oxygen saturation probe site  4.  Every 4-6 hours:  If on nasal continuous positive airway pressure, respiratory therapy assess nares and determine need for appliance change or resting period  3/6/2025 0947 by Madison Felix RN  Outcome: Not Progressing  3/6/2025 0050 by Howard Christy RN  Outcome: Progressing     Problem: Safety - Adult  Goal: Free from fall injury  3/6/2025 0947 by Madison Felix RN  Outcome: Not Progressing  3/6/2025 0050 by Howard Christy RN  Outcome: Progressing     Problem: ABCDS Injury Assessment  Goal: Absence of physical injury  3/6/2025 0947 by Madison Felix RN  Outcome: Not Progressing  3/6/2025 0050 by Howard Christy RN  Outcome: Progressing     Problem: Pain  Goal: Verbalizes/displays adequate comfort level or baseline comfort level  3/6/2025 0947 by Madison Felix RN  Outcome: Not Progressing  3/6/2025 0050 by Howard Christy RN  Outcome: Progressing     Problem: Discharge Planning  Goal: Discharge to home or other facility with appropriate resources  3/6/2025 0947 by Madison Felix RN  Outcome: Not Progressing  3/6/2025 0050 by Howard Christy RN  Outcome: Progressing     Problem: Skin/Tissue Integrity  Goal: Skin integrity remains intact  Description: 1.  Monitor for areas of redness and/or skin breakdown  2.  Assess vascular access sites hourly  3.  Every 4-6 hours minimum:  Change oxygen saturation probe site  4.  Every 4-6 hours:  If on nasal continuous positive airway pressure, respiratory therapy assess  Detail Level: Zone Include Location In Plan?: No Detail Level: Detailed

## 2025-03-06 NOTE — CONSULTS
Inpatient Cardiology Consultation      Reason for Consult:  AF RVR    Consulting Physician: Dr. La    Requesting Physician: Jose Shaw PA-C    Date of Consultation: 3/6/2025    HISTORY OF PRESENT ILLNESS: 69-year-old  male known to [facility] cardiology last seen by Dr. Juarez as an inpatient consult 9/12/2023 for AF. Follows outpatient with Dr Juarez.    Will attempt to get records from [facility] cardiology    1 week prior to admission coughing and fever(both patient and his wife).     SOB, and elevated HR for 2-3 days prior to admission. He came to the hospital because he was not feeling any better. Coughing up yellow sputum. Denies CP/pressure, dizziness, or palpitations. No fever, chills, diarrhea. Admits to missing doses of his Toprol XL. Stated he has NOT missed any doses of Eliquis.     [facility]-B 3/5/2025 /96 's AF RVR, afebrile, O2 saturation 95% on 5L NC.  Tmax overnight 99.1    Troponin 9>13, ALT 43>37, AST 48>44, Na 134, K+ 3.6>4.0, Bun/Cr 19/0.6>18/0.4, WBC 14.1>10.0, Hgb 13.9>11.3, Plt 353, influenza/COVID-19 negative.    Medications in ED: 1 liter NSS,  mg, IV ATB, Cardizem bolus/gtt, digoxin 250 mcgs IV x 1 dose, nebulizer treatments    Developed hypoxia (85% on 5 liters) overnight requiring increase in O2 up to 8L NC> weaned down to 6L NC    3/6/2025 @ 0015 Lopressor 5 mg IV x dose     Pulmonary consulted for COPD/hypoxia    Cardizem gtt @15 mg/hr    Currently /69 HR 54 afebrile O2 saturation 97% on 6L NC    Patient appears tachypneic with normal conversation.  He currently is on 6 L NC(home rate).  He has a moist productive cough with yellow sputum.  Heart rate continues to be a AF RVR.         Please note: past medical records were reviewed per electronic medical record (EMR) - see detailed reports under Past Medical/ Surgical History.   Past Medical History:    CAD   2013 nuclear stress: Nonischemic.  EF 60%  2016 Inferior STEMI  2016 PCI/LAUREN(2.7 x 28 
and plan of care with the following additions:    Follow up viral panel, AECOPD appears to be improving. Patient would like to use his home Trelegy instead of nebulized solutions here. Close to baseline O2 requirement. Requiring dilt gtt, cardiology following.    Trae Del Cid MD

## 2025-03-06 NOTE — H&P
Value Date    TROPHS 13 (H) 03/05/2025    TROPHS 9 03/05/2025    TROPHS <6 09/11/2023        Lab Results   Component Value Date    TSH 0.56 09/12/2023    VITD25 33.6 03/31/2021     Recent Radiological Studies:  XR CHEST PORTABLE   Final Result   1. Interval development of hazy opacity in the lung bases worse on the left.   Findings could be due to atelectasis, pneumonia, or pulmonary edema.   2. COPD.   3. Mild cardiomegaly.             Assessment  Active Hospital Problems    Diagnosis     Paroxysmal atrial fibrillation (HCC) [I48.0]      Priority: High    Arteriosclerosis of coronary artery [I25.10]     Long term (current) use of anticoagulants [Z79.01]     Non-rheumatic tricuspid valve insufficiency [I36.1]     Anxiety disorder [F41.9]     Chronic low back pain [M54.50, G89.29]        Plan  Afib w/ resolved RVR:   Telemetry  Cardiology consultation-- defer rate/rhythm control meds to cardiology.   Defer need for echo to cardio.   TSH  Eliquis    Community-acquired bacterial pneumonia:   CXR noted  Antibiotics   Pneumococcal/legionella urine ag's  Respiratory viral panel pending   Procalcitonin mildly elevated     COPD without exacerbation and chronic hypoxic respiratory failure:   Continue home bronchodilators  Currently on 6 L/min nasal cannula oxygen  Pulm consultation    PT/OT  Continue home medications  Follow labs  Please see orders for further management and care.   for discharge planning  Discharge plan: Home soon when okay with cardiology    Sathish Cline, DO   3/6/2025  10:00 AM    I can be reached through [employer].    NOTE:  This report was transcribed using voice recognition software.  Every effort was made to ensure accuracy; however, inadvertent computerized transcription errors may be present.

## 2025-03-06 NOTE — ED NOTES
424 CLEAN  
Patient resting in position of comfort on bed presenting in no acute/ apparent distress. Respirations are noted even and unlabored with good rise and fall of the chest observed. Patient updated with current plan of care (POC) and all questions/ concerns addressed. Patient voices no needs at this time. Bed noted in lowest position, locked, with side rails X 2 up for patient safety. Will continue to monitor patient for acute changes.      [x] Side rails up    [x] Cart in lowest position    [x] Family at bedside    [x] Call light within reach      
The following labs were labeled with appropriate pt sticker and tubed to lab:     [] Blue     [] Lavender   [] on ice  [x] Green/yellow  [] Green/black [] on ice  [] Grey  [] on ice  [] Yellow  [] Red  [] Pink  [] Type/ Screen  [] ABG  [] VBG    [] COVID-19 swab    [] Rapid  [] PCR  [] Flu swab  [] Peds Viral Panel     [] Urine Sample  [] Fecal Sample  [] Pelvic Cultures  [] Blood Cultures  [] X 2  [] STREP Cultures  [] Wound Cultures    
Abnormal; Notable for the following components:    Troponin, High Sensitivity 13 (*)     All other components within normal limits        Background  History:   Past Medical History:   Diagnosis Date    CAD (coronary artery disease)     Dr. Juarez yearly     COPD (chronic obstructive pulmonary disease) (HCC)     controlled with inhalers     Decreased dorsalis pedis pulse 3/11/2021    Depression     Enlarged prostate without lower urinary tract symptoms (luts)     Gallstone     for OR 10-7-19     Hypertension     Leg swelling     Lymphedema of both lower extremities 3/11/2021    Vitamin D deficiency        Assessment    Vitals:        Vitals:    03/05/25 1605 03/05/25 1646 03/05/25 1701 03/05/25 1734   BP: 119/66   (!) 121/96   Pulse: (!) 108 (!) 139  (!) 149   Resp: 18   18   Temp:       SpO2: 99% 98% 93% 96%   Weight:       Height:           PO Status: Regular    O2 Flow Rate: O2 Device: Nasal cannula O2 Flow Rate (L/min): 5 L/min    Cardiac Rhythm: S. Tach    Last documented pain medication administered: n/a    NIH Score: NIH       Active LDA's:   Peripheral IV 03/05/25 Left Antecubital (Active)   Site Assessment Clean, dry & intact 03/05/25 1330       Pertinent or High Risk Medications/Drips: no   If Yes, please provide details:       Blood Product Administration: no  If Yes, please provide details:     Recommendation    Incomplete orders   Additional Comments:    If any further questions, please call Sending RN     Electronically signed by: Electronically signed by Gerardo Clarke RN on 3/5/2025 at 7:09 PM

## 2025-03-06 NOTE — CARE COORDINATION
Introduced my self and provided explanation of CM role to patient.  Patient is awake, alert, and aware of current diagnosis and treatment plan including IV antibiotic, IV anti-arrhythmic, cardiac monitoring, cardio consult.  He voices he resides at home with his spouse and completes his adl's with independence.  Patient is established with a pcp and denies any issue with retail pharmaceutical coverage.  Patient has O2 6 L at home, uses both stationary concentrator and portable equipment.  Has nebulizer as well; equipment provider is [facility].  Patient verbalizes no concerns and identifies no areas to focus on nor barriers to discharge.   Explained ELOS of 48 hours; patient voiced understanding and agreement.    Patient will need followed and assisted with discharge planning as necessary.   Rafa Solo, MSN RN  [facility] Case Management  [phone]

## 2025-03-06 NOTE — ACP (ADVANCE CARE PLANNING)
Advance Care Planning   Healthcare Decision Maker:    Primary Decision Maker: JourdanGayla - [facility] - [phone]    Click here to complete Healthcare Decision Makers including selection of the Healthcare Decision Maker Relationship (ie \"Primary\").

## 2025-03-07 ENCOUNTER — APPOINTMENT (OUTPATIENT)
Age: 70
DRG: 308 | End: 2025-03-07
Payer: MEDICARE

## 2025-03-07 VITALS
SYSTOLIC BLOOD PRESSURE: 160 MMHG | OXYGEN SATURATION: 93 % | DIASTOLIC BLOOD PRESSURE: 85 MMHG | HEIGHT: 68 IN | TEMPERATURE: 97.6 F | WEIGHT: 179.4 LBS | HEART RATE: 92 BPM | BODY MASS INDEX: 27.19 KG/M2 | RESPIRATION RATE: 18 BRPM

## 2025-03-07 LAB
ALBUMIN SERPL-MCNC: 3.1 G/DL (ref 3.5–5.2)
ALP SERPL-CCNC: 109 U/L (ref 40–129)
ALT SERPL-CCNC: 34 U/L (ref 0–40)
ANION GAP SERPL CALCULATED.3IONS-SCNC: 9 MMOL/L (ref 7–16)
AST SERPL-CCNC: 26 U/L (ref 0–39)
BASOPHILS # BLD: 0 K/UL (ref 0–0.2)
BASOPHILS NFR BLD: 0 % (ref 0–2)
BILIRUB SERPL-MCNC: 0.5 MG/DL (ref 0–1.2)
BUN SERPL-MCNC: 15 MG/DL (ref 6–23)
CALCIUM SERPL-MCNC: 9.1 MG/DL (ref 8.6–10.2)
CHLORIDE SERPL-SCNC: 94 MMOL/L (ref 98–107)
CO2 SERPL-SCNC: 34 MMOL/L (ref 22–29)
CREAT SERPL-MCNC: 0.5 MG/DL (ref 0.7–1.2)
EOSINOPHIL # BLD: 0 K/UL (ref 0.05–0.5)
EOSINOPHILS RELATIVE PERCENT: 0 % (ref 0–6)
ERYTHROCYTE [DISTWIDTH] IN BLOOD BY AUTOMATED COUNT: 12.1 % (ref 11.5–15)
GFR, ESTIMATED: >90 ML/MIN/1.73M2
GLUCOSE SERPL-MCNC: 158 MG/DL (ref 74–99)
HCT VFR BLD AUTO: 35.7 % (ref 37–54)
HGB BLD-MCNC: 10.8 G/DL (ref 12.5–16.5)
LYMPHOCYTES NFR BLD: 0.22 K/UL (ref 1.5–4)
LYMPHOCYTES RELATIVE PERCENT: 3 % (ref 20–42)
MCH RBC QN AUTO: 26.7 PG (ref 26–35)
MCHC RBC AUTO-ENTMCNC: 30.3 G/DL (ref 32–34.5)
MCV RBC AUTO: 88.1 FL (ref 80–99.9)
MONOCYTES NFR BLD: 0.07 K/UL (ref 0.1–0.95)
MONOCYTES NFR BLD: 1 % (ref 2–12)
NEUTROPHILS NFR BLD: 97 % (ref 43–80)
NEUTS SEG NFR BLD: 8.11 K/UL (ref 1.8–7.3)
PLATELET # BLD AUTO: 262 K/UL (ref 130–450)
PMV BLD AUTO: 10.1 FL (ref 7–12)
POTASSIUM SERPL-SCNC: 4 MMOL/L (ref 3.5–5)
PROT SERPL-MCNC: 6.8 G/DL (ref 6.4–8.3)
RBC # BLD AUTO: 4.05 M/UL (ref 3.8–5.8)
RBC # BLD: ABNORMAL 10*6/UL
RBC # BLD: ABNORMAL 10*6/UL
SODIUM SERPL-SCNC: 137 MMOL/L (ref 132–146)
WBC OTHER # BLD: 8.4 K/UL (ref 4.5–11.5)

## 2025-03-07 PROCEDURE — 99233 SBSQ HOSP IP/OBS HIGH 50: CPT | Performed by: INTERNAL MEDICINE

## 2025-03-07 PROCEDURE — 2700000000 HC OXYGEN THERAPY PER DAY

## 2025-03-07 PROCEDURE — 6370000000 HC RX 637 (ALT 250 FOR IP): Performed by: INTERNAL MEDICINE

## 2025-03-07 PROCEDURE — 2580000003 HC RX 258: Performed by: PHYSICIAN ASSISTANT

## 2025-03-07 PROCEDURE — C8929 TTE W OR WO FOL WCON,DOPPLER: HCPCS

## 2025-03-07 PROCEDURE — 94640 AIRWAY INHALATION TREATMENT: CPT

## 2025-03-07 PROCEDURE — 6360000002 HC RX W HCPCS: Performed by: NURSE PRACTITIONER

## 2025-03-07 PROCEDURE — 85025 COMPLETE CBC W/AUTO DIFF WBC: CPT

## 2025-03-07 PROCEDURE — 5A0935A ASSISTANCE WITH RESPIRATORY VENTILATION, LESS THAN 24 CONSECUTIVE HOURS, HIGH NASAL FLOW/VELOCITY: ICD-10-PCS | Performed by: INTERNAL MEDICINE

## 2025-03-07 PROCEDURE — 2500000003 HC RX 250 WO HCPCS: Performed by: PHYSICIAN ASSISTANT

## 2025-03-07 PROCEDURE — 6370000000 HC RX 637 (ALT 250 FOR IP): Performed by: NURSE PRACTITIONER

## 2025-03-07 PROCEDURE — 6370000000 HC RX 637 (ALT 250 FOR IP): Performed by: PHYSICIAN ASSISTANT

## 2025-03-07 PROCEDURE — 80053 COMPREHEN METABOLIC PANEL: CPT

## 2025-03-07 PROCEDURE — 97165 OT EVAL LOW COMPLEX 30 MIN: CPT

## 2025-03-07 PROCEDURE — 97530 THERAPEUTIC ACTIVITIES: CPT

## 2025-03-07 RX ORDER — GUAIFENESIN 400 MG/1
400 TABLET ORAL 3 TIMES DAILY
Qty: 21 TABLET | Refills: 0 | Status: SHIPPED | OUTPATIENT
Start: 2025-03-07 | End: 2025-03-14

## 2025-03-07 RX ORDER — METOPROLOL SUCCINATE 50 MG/1
50 TABLET, EXTENDED RELEASE ORAL DAILY
Qty: 30 TABLET | Refills: 5 | Status: SHIPPED | OUTPATIENT
Start: 2025-03-08

## 2025-03-07 RX ORDER — OSELTAMIVIR PHOSPHATE 75 MG/1
75 CAPSULE ORAL 2 TIMES DAILY
Qty: 9 CAPSULE | Refills: 0 | Status: SHIPPED | OUTPATIENT
Start: 2025-03-07 | End: 2025-03-12

## 2025-03-07 RX ORDER — PREDNISONE 20 MG/1
40 TABLET ORAL DAILY
Status: DISCONTINUED | OUTPATIENT
Start: 2025-03-08 | End: 2025-03-07 | Stop reason: HOSPADM

## 2025-03-07 RX ORDER — PREDNISONE 10 MG/1
TABLET ORAL
Qty: 30 TABLET | Refills: 0 | Status: SHIPPED | OUTPATIENT
Start: 2025-03-07 | End: 2025-03-17

## 2025-03-07 RX ORDER — LEVALBUTEROL INHALATION SOLUTION 0.63 MG/3ML
0.63 SOLUTION RESPIRATORY (INHALATION) EVERY 6 HOURS PRN
Qty: 120 EACH | Refills: 2 | Status: SHIPPED | OUTPATIENT
Start: 2025-03-07

## 2025-03-07 RX ADMIN — METHYLPREDNISOLONE SODIUM SUCCINATE 40 MG: 40 INJECTION INTRAMUSCULAR; INTRAVENOUS at 10:23

## 2025-03-07 RX ADMIN — OSELTAMIVIR PHOSPHATE 75 MG: 75 CAPSULE ORAL at 08:26

## 2025-03-07 RX ADMIN — ASPIRIN 81 MG CHEWABLE TABLET 81 MG: 81 TABLET CHEWABLE at 08:26

## 2025-03-07 RX ADMIN — METOPROLOL SUCCINATE 50 MG: 50 TABLET, FILM COATED, EXTENDED RELEASE ORAL at 08:26

## 2025-03-07 RX ADMIN — APIXABAN 5 MG: 5 TABLET, FILM COATED ORAL at 08:26

## 2025-03-07 RX ADMIN — DILTIAZEM HYDROCHLORIDE 5 MG/HR: 5 INJECTION, SOLUTION INTRAVENOUS at 08:27

## 2025-03-07 RX ADMIN — DILTIAZEM HYDROCHLORIDE 7.5 MG/HR: 5 INJECTION, SOLUTION INTRAVENOUS at 06:17

## 2025-03-07 RX ADMIN — LEVALBUTEROL HYDROCHLORIDE 0.63 MG: 0.63 SOLUTION RESPIRATORY (INHALATION) at 01:23

## 2025-03-07 RX ADMIN — IPRATROPIUM BROMIDE 0.5 MG: 0.5 SOLUTION RESPIRATORY (INHALATION) at 01:23

## 2025-03-07 RX ADMIN — GUAIFENESIN 400 MG: 400 TABLET ORAL at 08:26

## 2025-03-07 RX ADMIN — SERTRALINE 100 MG: 100 TABLET, FILM COATED ORAL at 08:26

## 2025-03-07 RX ADMIN — DILTIAZEM HYDROCHLORIDE 2.5 MG/HR: 5 INJECTION, SOLUTION INTRAVENOUS at 09:28

## 2025-03-07 RX ADMIN — SODIUM CHLORIDE, PRESERVATIVE FREE 10 ML: 5 INJECTION INTRAVENOUS at 08:26

## 2025-03-07 NOTE — PLAN OF CARE
Problem: Discharge Planning  Goal: Discharge to home or other facility with appropriate resources  3/7/2025 0939 by Madison Felix RN  Outcome: Not Progressing  3/7/2025 0228 by Howard Christy RN  Outcome: Progressing     Problem: Skin/Tissue Integrity  Goal: Skin integrity remains intact  Description: 1.  Monitor for areas of redness and/or skin breakdown  2.  Assess vascular access sites hourly  3.  Every 4-6 hours minimum:  Change oxygen saturation probe site  4.  Every 4-6 hours:  If on nasal continuous positive airway pressure, respiratory therapy assess nares and determine need for appliance change or resting period  3/7/2025 0939 by Madison Felix RN  Outcome: Not Progressing  3/7/2025 0228 by Howard Christy RN  Outcome: Progressing     Problem: Safety - Adult  Goal: Free from fall injury  3/7/2025 0939 by Madison Felix RN  Outcome: Not Progressing  3/7/2025 0228 by Howard Christy RN  Outcome: Progressing     Problem: ABCDS Injury Assessment  Goal: Absence of physical injury  3/7/2025 0228 by Howard Christy RN  Outcome: Progressing     Problem: Pain  Goal: Verbalizes/displays adequate comfort level or baseline comfort level  3/7/2025 0228 by Howard Christy RN  Outcome: Progressing     Problem: Nutrition Deficit:  Goal: Optimize nutritional status  3/7/2025 0228 by Howard Christy RN  Outcome: Progressing     Problem: Discharge Planning  Goal: Discharge to home or other facility with appropriate resources  3/7/2025 0939 by Madison Felix RN  Outcome: Not Progressing  3/7/2025 0228 by Howard Christy RN  Outcome: Progressing     Problem: Skin/Tissue Integrity  Goal: Skin integrity remains intact  Description: 1.  Monitor for areas of redness and/or skin breakdown  2.  Assess vascular access sites hourly  3.  Every 4-6 hours minimum:  Change oxygen saturation probe site  4.  Every 4-6 hours:  If on nasal continuous positive airway pressure, respiratory therapy assess nares and determine

## 2025-03-07 NOTE — DISCHARGE SUMMARY
Internal Medicine Discharge Summary    NAME: Waldo Soliman :  1955  MRN:  [id number] PCP:Ilir Rouse MD    ADMITTED: 3/5/2025   DISCHARGED: 3/7/2025  4:11 PM    ADMITTING PHYSICIAN: Sathish Cline DO    PCP: Ilir Rouse MD    CONSULTANT(S):   IP CONSULT TO PRIMARY CARE PROVIDER  IP CONSULT TO CARDIOLOGY  IP CONSULT TO PULMONOLOGY     ADMITTING DIAGNOSIS:   Flutter-fibrillation (HCC) [I48.91, I48.92]  Elevated LFTs [R79.89]  Infiltrate of left lung present on chest x-ray [R91.8]     Please see H&P for further details    DISCHARGE DIAGNOSES:   Active Hospital Problems    Diagnosis     Anxiety disorder [F41.9]      Priority: Medium    Arteriosclerosis of coronary artery [I25.10]     Long term (current) use of anticoagulants [Z79.01]     Non-rheumatic tricuspid valve insufficiency [I36.1]     Paroxysmal atrial fibrillation (HCC) [I48.0]     Chronic low back pain [M54.50, G89.29]        BRIEF HISTORY OF PRESENT ILLNESS: Waldo Soliman is a 69 y.o. male patient of Ilir Rouse MD who  has a past medical history of CAD (coronary artery disease), COPD (chronic obstructive pulmonary disease) (HCC), Decreased dorsalis pedis pulse, Depression, Enlarged prostate without lower urinary tract symptoms (luts), Gallstone, Hypertension, Leg swelling, Lymphedema of both lower extremities, and Vitamin D deficiency. who originally had concerns including Shortness of Breath (Hx afib. In afib now 157 at pivot). at presentation on 3/5/2025, and was found to have Flutter-fibrillation (HCC) [I48.91, I48.92]  Elevated LFTs [R79.89]  Infiltrate of left lung present on chest x-ray [R91.8] after workup.    Please see H&P for further details.    HOSPITAL COURSE:   The patient presented to the hospital with the chief complaint of Shortness of Breath (Hx afib. In afib now 157 at pivot)  . The patient was admitted to the hospital.     Atrial Fibrillation with RVR and Secondary Hypercoagulable State  Weaned off Cardizem

## 2025-03-08 LAB
ECHO AV AREA PEAK VELOCITY: 3.1 CM2
ECHO AV AREA VTI: 3.4 CM2
ECHO AV AREA/BSA PEAK VELOCITY: 1.6 CM2/M2
ECHO AV AREA/BSA VTI: 1.7 CM2/M2
ECHO AV MEAN GRADIENT: 3 MMHG
ECHO AV MEAN VELOCITY: 0.7 M/S
ECHO AV PEAK GRADIENT: 5 MMHG
ECHO AV PEAK VELOCITY: 1.2 M/S
ECHO AV VELOCITY RATIO: 0.92
ECHO AV VTI: 17 CM
ECHO BSA: 1.97 M2
ECHO LA DIAMETER INDEX: 1.95 CM/M2
ECHO LA DIAMETER: 3.8 CM
ECHO LA VOL A-L A2C: 58 ML (ref 18–58)
ECHO LA VOL A-L A4C: 46 ML (ref 18–58)
ECHO LA VOL MOD A2C: 57 ML (ref 18–58)
ECHO LA VOL MOD A4C: 43 ML (ref 18–58)
ECHO LA VOLUME AREA LENGTH: 56 ML
ECHO LA VOLUME INDEX A-L A2C: 30 ML/M2 (ref 16–34)
ECHO LA VOLUME INDEX A-L A4C: 24 ML/M2 (ref 16–34)
ECHO LA VOLUME INDEX AREA LENGTH: 29 ML/M2 (ref 16–34)
ECHO LA VOLUME INDEX MOD A2C: 29 ML/M2 (ref 16–34)
ECHO LA VOLUME INDEX MOD A4C: 22 ML/M2 (ref 16–34)
ECHO LV EDV A2C: 70 ML
ECHO LV EDV A4C: 102 ML
ECHO LV EDV BP: 86 ML (ref 67–155)
ECHO LV EDV INDEX A4C: 52 ML/M2
ECHO LV EDV INDEX BP: 44 ML/M2
ECHO LV EDV NDEX A2C: 36 ML/M2
ECHO LV EJECTION FRACTION 3D: 59 %
ECHO LV EJECTION FRACTION A2C: 55 %
ECHO LV EJECTION FRACTION A4C: 61 %
ECHO LV EJECTION FRACTION BIPLANE: 59 % (ref 55–100)
ECHO LV ESV A2C: 31 ML
ECHO LV ESV A4C: 40 ML
ECHO LV ESV BP: 35 ML (ref 22–58)
ECHO LV ESV INDEX A2C: 16 ML/M2
ECHO LV ESV INDEX A4C: 21 ML/M2
ECHO LV ESV INDEX BP: 18 ML/M2
ECHO LV FRACTIONAL SHORTENING: 33 % (ref 28–44)
ECHO LV INTERNAL DIMENSION DIASTOLE INDEX: 2.15 CM/M2
ECHO LV INTERNAL DIMENSION DIASTOLIC: 4.2 CM (ref 4.2–5.9)
ECHO LV INTERNAL DIMENSION SYSTOLIC INDEX: 1.44 CM/M2
ECHO LV INTERNAL DIMENSION SYSTOLIC: 2.8 CM
ECHO LV IVSD: 1.2 CM (ref 0.6–1)
ECHO LV IVSS: 1.4 CM
ECHO LV MASS 2D: 189.2 G (ref 88–224)
ECHO LV MASS INDEX 2D: 97 G/M2 (ref 49–115)
ECHO LV POSTERIOR WALL DIASTOLIC: 1.3 CM (ref 0.6–1)
ECHO LV POSTERIOR WALL SYSTOLIC: 1.3 CM
ECHO LV RELATIVE WALL THICKNESS RATIO: 0.62
ECHO LVOT AREA: 3.5 CM2
ECHO LVOT AV VTI INDEX: 1.04
ECHO LVOT DIAM: 2.1 CM
ECHO LVOT MEAN GRADIENT: 3 MMHG
ECHO LVOT PEAK GRADIENT: 5 MMHG
ECHO LVOT PEAK VELOCITY: 1.1 M/S
ECHO LVOT STROKE VOLUME INDEX: 31.2 ML/M2
ECHO LVOT SV: 60.9 ML
ECHO LVOT VTI: 17.6 CM
ECHO MV AREA PHT: 2.2 CM2
ECHO MV AREA VTI: 2.8 CM2
ECHO MV E DECELERATION TIME (DT): 174.4 MS
ECHO MV LVOT VTI INDEX: 1.25
ECHO MV MAX VELOCITY: 1 M/S
ECHO MV MEAN GRADIENT: 2 MMHG
ECHO MV MEAN VELOCITY: 0.7 M/S
ECHO MV PEAK GRADIENT: 4 MMHG
ECHO MV PRESSURE HALF TIME (PHT): 101.2 MS
ECHO MV VTI: 22 CM
ECHO PV MAX VELOCITY: 1 M/S
ECHO PV MEAN GRADIENT: 2 MMHG
ECHO PV MEAN VELOCITY: 0.7 M/S
ECHO PV PEAK GRADIENT: 4 MMHG
ECHO PV VTI: 15.5 CM
ECHO RV TAPSE: 1.9 CM (ref 1.7–?)

## 2025-03-08 NOTE — PROGRESS NOTES
Brannon Willson M.D.,[facility]  Dustin Fan D.O., F.ELINA., [facility]  Edison Ortiz M.D.  Leeanne Alvarenga M.D.   Hemanth Antunez D.O.  Trae Del Cid M.D.         Daily Pulmonary Progress Note    Patient:  Waldo Soliman 69 y.o. male MRN: [id number]            Synopsis     We are following patient for COPD exacerbation, hypoxic respiratory failure    \"CC\" shortness of breath, cough    Code status: Full      Subjective      Patient was seen and examined.  Feeling better today, oxygen increased to 8 L earlier weaned back to baseline 5 L SpO2 maintained 93%.  Heart rate 79.  Occasional cough not expectorating.  Respiratory viral panel positive for flu A.  Started on Tamiflu.  Pred taper for discharge.      Review of Systems:  Constitutional: Denies fever, weight loss, night sweats fatigue and malaise  Skin: Denies pigmentation, dark lesions, and rashes   HEENT: Congestion  Cardiovascular: Denies palpitations, chest pain, and chest pressure.  Respiratory: Cough, dyspnea, congestion, sputum, hypoxia  Gastrointestinal: Denies nausea, vomiting, poor appetite  Genitourinary: Denies dysuria, frequency, urgency or hematuria  Musculoskeletal: Denies myalgias, muscle weakness, and bone pain  Neurological: Denies dizziness, vertigo, headache, and focal weakness  Psychological: Denies anxiety and depression  Endocrine: Denies heat intolerance and cold intolerance  Hematopoietic/Lymphatic: Denies bleeding problems and blood transfusions    24-hour events:  None    Objective   OBJECTIVE:   /72   Pulse 80   Temp 98.2 °F (36.8 °C) (Temporal)   Resp 20   Ht 1.727 m (5' 8\")   Wt 81.4 kg (179 lb 6.4 oz)   SpO2 97%   BMI 27.28 kg/m²   SpO2 Readings from Last 1 Encounters:   03/07/25 97%        I/O:    Intake/Output Summary (Last 24 hours) at 3/7/2025 1403  Last data filed at 3/7/2025 0631  Gross per 24 hour   Intake 10 ml   Output 950 ml   Net -940 ml                      CURRENT MEDS :  Scheduled Meds:   [START ON 
4 Eyes Skin Assessment     NAME:  Waldo Soliman  YOB: 1955  MEDICAL RECORD NUMBER:  [id number]    The patient is being assessed for  Admission    I agree that at least one RN has performed a thorough Head to Toe Skin Assessment on the patient. ALL assessment sites listed below have been assessed.      Areas assessed by both nurses:    Head, Face, Ears, Shoulders, Back, Chest, Arms, Elbows, Hands, Sacrum. Buttock, Coccyx, Ischium, Legs. Feet and Heels, and Under Medical Devices         Does the Patient have a Wound? No noted wound(s)       George Prevention initiated by RN: No  Wound Care Orders initiated by RN: No    Pressure Injury (Stage 3,4, Unstageable, DTI, NWPT, and Complex wounds) if present, place Wound referral order by RN under : No    New Ostomies, if present place, Ostomy referral order under : No     Nurse 1 eSignature: Electronically signed by ANN CEBALLOS RN on 3/5/25 at 11:38 PM EST    **SHARE this note so that the co-signing nurse can place an eSignature**    Nurse 2 eSignature: {Esignature:[id number]}    
Bedside echo complete   
Discharge instructions provided to wife and patient all questions answered   
Dr. Del Cid notified of consult.   
IV site removed- tele removed   Wife brought home O2.   Transport in for discharge home   
Internal Medicine Progress Note    Patient's name: Waldo Soliman  : 1955  Chief complaints (on day of admission): Shortness of Breath (Hx afib. In afib now 157 at pivot)  Admission date: 3/5/2025  Date of service: 3/7/2025   Room: [address] INTERNAL MEDICINE  Primary care physician: Ilir Rouse MD  Reason for visit: Follow-up for atrial fibrillation with RVR, influenza A/COPD exacerbation    Subjective  Waldo is seen lying in bed asleep, no distress.  He does easily awaken to voice.  He denies any shortness of breath or difficulty breathing at this time, overall feels much better.  He denies any fever or chills.  Denies any nausea or vomiting.  He is asking when he can go home.  In discussion with nursing he did have episodes of bradycardia with heart rate into the 50s while asleep.  He does remain on Cardizem drip at 10 mg/h, hopefully will taper off today however defer to cardiology.  No other issues or concerns from nursing.    Review of Systems  Full 10 point review of systems negative unless mentioned above.    Hospital Medications  Current Facility-Administered Medications   Medication Dose Route Frequency Provider Last Rate Last Admin    ALPRAZolam (XANAX) tablet 0.5 mg  0.5 mg Oral Q8H PRN Sosa Dimas APRN - CNP   0.5 mg at 25    guaiFENesin tablet 400 mg  400 mg Oral TID Sosa Dimas APRN - CNP   400 mg at 25    melatonin tablet 3 mg  3 mg Oral Nightly PRN Sathish Cline DO        arformoterol tartrate (BROVANA) nebulizer solution 15 mcg  15 mcg Nebulization BID RT Buddy Alonso APRN - CNP   15 mcg at 25    budesonide (PULMICORT) nebulizer suspension 500 mcg  500 mcg Nebulization BID RT Buddy Alonso APRN - CNP   500 mcg at 25    levalbuterol (XOPENEX) nebulization 0.63 mg  0.63 mg Nebulization Q6H Buddy Alonso APRN - CNP   0.63 mg at 25 0123    ipratropium (ATROVENT) 0.02 % nebulizer solution 0.5 mg  0.5 mg 
Nutrition Assessment     Type and Reason for Visit: Positive nutrition screen, Initial    Nutrition Recommendations/Plan:   Continue diet per orders.  Added ONS BID to optimize nutrient intake.     Malnutrition Assessment:  Malnutrition Status:  At risk d/t reduced appetite    Nutrition Assessment:  ADM w/A Fib, + Influenza A. PMH: COPD, Lymphedema, Current tobacco dep. Taking only bites pudding per wife at bedside. Added HP gelatein & frozen ONS one each daily to optimize nutrient intake. Will monitor.      Nutrition Related Findings:   A& O x4, abd WDL, + BS, no edema, Tamiflu,  hypoglycemia, AST 44, steroid Wound Type: None    Current Nutrition Therapies:    ADULT DIET; Regular  ADULT ORAL NUTRITION SUPPLEMENT; Breakfast; Fortified Gelatin Oral Supplement  ADULT ORAL NUTRITION SUPPLEMENT; Lunch; Frozen Oral Supplement    Anthropometric Measures:  Height: 172.7 cm (5' 8\")  Current Body Wt: 80.7 kg (177 lb 14.6 oz)   BMI: 27.1        Nutrition Diagnosis:   Inadequate oral intake related to impaired respiratory function (flu-like symptoms) as evidenced by intake 0-25%    Nutrition Interventions:   Food and/or Nutrient Delivery: Continue Current Diet, Start Oral Nutrition Supplement  Nutrition Education/Counseling: No recommendation at this time  Coordination of Nutrition Care: Continue to monitor while inpatient       Goals:  Goals: Meet at least 75% of estimated needs, by next RD assessment     Previous Goal Met: New Goal    Nutrition Monitoring and Evaluation:         Physical Signs/Symptoms Outcomes: Biochemical Data, Nutrition Focused Physical Findings, Skin, Weight, Fluid Status or Edema    Discharge Planning:    Continue current diet     Harriet Bee RD  Contact: [phone]      
Physical Therapy  Facility/Department: [address] INTERNAL MEDICINE 2  Physical Therapy Initial Assessment    Name: Waldo Soliman  : 1955  MRN: [id number]  Date of Service: 3/6/2025    Patient Diagnosis(es): The primary encounter diagnosis was Flutter-fibrillation (HCC). Diagnoses of Elevated LFTs, Infiltrate of left lung present on chest x-ray, and Paroxysmal atrial fibrillation (HCC) were also pertinent to this visit.  Past Medical History:  has a past medical history of CAD (coronary artery disease), COPD (chronic obstructive pulmonary disease) (HCC), Decreased dorsalis pedis pulse, Depression, Enlarged prostate without lower urinary tract symptoms (luts), Gallstone, Hypertension, Leg swelling, Lymphedema of both lower extremities, and Vitamin D deficiency.  Past Surgical History:  has a past surgical history that includes Cardiac surgery; Cholecystectomy, laparoscopic (N/A, 2019); Colonoscopy; hernia repair; ERCP (N/A, 10/7/2019); and ERCP (N/A, 10/9/2019).          Referring provider:  Sathish Cline DO    PT Order:  PT eval and treat     Evaluating PT:  Megan Root PT, DPT PT [id number]    Room #:  0424/0424-A  Diagnosis:  Flutter-fibrillation (HCC) [I48.91, I48.92]  Elevated LFTs [R79.89]  Infiltrate of left lung present on chest x-ray [R91.8]  Precautions:  fall risk, O2 at 6L.  Equipment Needs:  none.      SUBJECTIVE:    Pt lives with his wife in a 1 story home with 2 stairs to enter and 0 rail.  Bed and bath is on first floor.  Pt ambulated with no device PTA.      OBJECTIVE:   Initial Evaluation  Date: 3/6 Treatment Short Term/ Long Term   Goals   Was pt agreeable to Eval/treatment? yes     Does pt have pain? None reported     Bed Mobility  Rolling: independent  Supine to sit: independent  Sit to supine: independent  Scooting: independent to sitting EOB  independent   Transfers Sit to stand: SBA  Stand to sit: SBA  Stand pivot: NT  Modified independent   Ambulation    15 feet and 10 feet without 
Secure message sent to primary that patient is positive for Influenza A   
development of hazy opacity in the lung bases worse on the left. Findings could be due to atelectasis, pneumonia, or pulmonary edema.  COPD.  Mild cardiomegaly.     Intake/Output Summary (Last 24 hours) at 3/7/2025 2122  Last data filed at 3/7/2025 0631  Gross per 24 hour   Intake --   Output 950 ml   Net -950 ml       Labs:   CBC:   Recent Labs     03/06/25  0330 03/07/25  0503   WBC 10.0 8.4   HGB 11.3* 10.8*   HCT 35.4* 35.7*    262     BMP:   Recent Labs     03/06/25  0330 03/07/25  0503    137   K 4.0 4.0   CO2 32* 34*   BUN 18 15   CREATININE 0.4* 0.5*   LABGLOM >90 >90   CALCIUM 8.4* 9.1     TFT:   Lab Results   Component Value Date    TSH 0.56 03/06/2025      proBNP:   Lab Results   Component Value Date/Time    PROBNP 1,040 03/06/2025 03:30 AM    PROBNP 320 09/11/2023 04:00 PM    PROBNP 3,782 10/17/2021 10:03 AM     TROPONIN:  Lab Results   Component Value Date/Time    TROPHS 13 03/05/2025 03:14 PM    TROPHS 9 03/05/2025 01:33 PM    TROPHS <6 09/11/2023 06:02 PM    TROPHS 6 09/11/2023 04:00 PM    TROPHS 13 10/17/2021 10:03 AM     FASTING LIPID PANEL:  Lab Results   Component Value Date/Time    CHOL 118 09/28/2021 12:00 AM    HDL 49 09/28/2021 12:00 AM    TRIG 80 09/28/2021 12:00 AM     LIVER PROFILE:  Recent Labs     03/06/25  0330 03/07/25  0503   AST 44* 26   ALT 37 34     COVID19:   Recent Labs     03/06/25 0925   COVID19 Not Detected       EKG shows A-fib with  bpm and nonspecific ST-T wave changes.      Assessment  SOB  TAMAYO  Fatigue  Cough with yellow sputum  A fib with RVR  Elevated LFT  Fever  COPD Exacerbation  Suspected pneumonia  CAD   2013 nuclear stress: Nonischemic.  EF 60%  2016 Inferior STEMI  2016 PCI/LAUREN(2.7 x 28 Promus) mid  RCA(LHC report not available)  2019 Lexiscan MPS: Nonischemic.   9/2023 ASA stopped by Dr. Juarez during admission 9/20/2023(was placed on Eliquis at that time for AF)  PHTN  2/3/2021 TTE Dr. Antunez: EF 65%.  Normal LV size mildly dilated RV with 
provided this date included:   Instruction/training on safety and adapted techniques for completion of ADLs.    Instruction/training on safe functional mobility/transfer techniques.    Instruction/training on energy conservation/work simplification for completion of ADLs.     Patient education provided regardin) potential benefits of DME/AD use to maximize independence/safety with ADLs in home environment. Patient indicated understanding.    Further skilled OT treatment indicated to increase patient's safety and independence with completion of ADL/IADL tasks in order to maximize patient's functional independence and quality of life.    Rehab Potential: Good for established goals.  Patient / Family Goal: Patient hopes to be discharged home later this date.  Patient and/or family were instructed on functional diagnosis, prognosis/goals, and OT plan of care. Demonstrated Good understanding.    Eval Complexity: Low    Time In: 1500  Time Out: 1523  Total Treatment Time: 8 minutes      Minutes Units   OT Eval Low 22356 15 1   OT Eval Medium 27506     OT Eval High 66472     OT Re-Eval 86040     Therapeutic Ex 73582     Therapeutic Activities 42751 8 1   ADL/Self Care 93366     Orthotic Management 37712     Neuro Re-Ed 59817     Non-Billable Time N/A ---     Evaluation time includes thorough review of current medical information, gathering information on past medical history/social history and prior level of function, completion of standardized testing/informal observation of tasks, assessment of data, and education on plan of care and goals.    Carla Molina OTR/L  License Number: [id number]

## (undated) DEVICE — TROCAR: Brand: KII SHIELDED BLADED ACCESS SYSTEM

## (undated) DEVICE — KIT,ANTI FOG,W/SPONGE & FLUID,SOFT PACK: Brand: MEDLINE

## (undated) DEVICE — SKIN AFFIX SURG ADHESIVE 72/CS 0.55ML: Brand: MEDLINE

## (undated) DEVICE — BLADE CLIPPER GEN PURP NS

## (undated) DEVICE — TROCAR: Brand: KII FIOS FIRST ENTRY

## (undated) DEVICE — ELECTRODE PT RET AD L9FT HI MOIST COND ADH HYDRGEL CORDED

## (undated) DEVICE — SPHINCTEROTOME: Brand: DREAMTOME™ RX 44

## (undated) DEVICE — BLOCK BITE 60FR RUBBER ADLT DENTAL

## (undated) DEVICE — SYRINGE 20ML LL S/C 50

## (undated) DEVICE — INSUFFLATION NEEDLE TO ESTABLISH PNEUMOPERITONEUM.: Brand: INSUFFLATION NEEDLE

## (undated) DEVICE — TIBURON GENERAL ENDOSCOPY DRAPE: Brand: CONVERTORS

## (undated) DEVICE — SPONGE GZ W4XL4IN RAYON POLY FILL CVR W/ NONWOVEN FAB

## (undated) DEVICE — STANDARD HYPODERMIC NEEDLE,ALUMINUM HUB: Brand: MONOJECT

## (undated) DEVICE — INTENDED FOR TISSUE SEPARATION, AND OTHER PROCEDURES THAT REQUIRE A SHARP SURGICAL BLADE TO PUNCTURE OR CUT.: Brand: BARD-PARKER ® STAINLESS STEEL BLADES

## (undated) DEVICE — APPLIER CLP M L L11.4IN DIA10MM ENDOSCP ROT MULT FOR LIG

## (undated) DEVICE — SURGICAL PROCEDURE PACK BASIC

## (undated) DEVICE — LOOP LIG SUT SZ 0 L18IN ABSRB POLYDIOXANONE MFIL PDS II

## (undated) DEVICE — TROCAR: Brand: KII® SLEEVE

## (undated) DEVICE — RETRIEVAL BALLOON CATHETER: Brand: EXTRACTOR™ PRO RX

## (undated) DEVICE — SYRINGE MED 10ML LUERLOCK TIP W/O SFTY DISP

## (undated) DEVICE — GRADUATE TRIANG MEASURE 1000ML BLK PRNT

## (undated) DEVICE — CATH CHOLANGIO 19GA PRE-VIEW KUMAR

## (undated) DEVICE — CHLORAPREP 26ML ORANGE

## (undated) DEVICE — CONTROL SYRINGE LUER-LOCK TIP: Brand: MONOJECT

## (undated) DEVICE — SET ENDO INSTR LAPAROSCOPIC STGENLAP

## (undated) DEVICE — CAMERA STRYKER 1488 HD GEN

## (undated) DEVICE — INSUFFLATION TUBING SET WITH FILTER, FUNNEL CONNECTOR AND LUER LOCK: Brand: JOSNOE MEDICAL INC

## (undated) DEVICE — 1.5L THIN WALL CAN: Brand: CRD

## (undated) DEVICE — PATIENT RETURN ELECTRODE, SINGLE-USE, CONTACT QUALITY MONITORING, ADULT, WITH 9FT CORD, FOR PATIENTS WEIGING OVER 33LBS. (15KG): Brand: MEGADYNE

## (undated) DEVICE — GLOVE SURG SZ 75 STD WHT LTX SYN POLYMER BEAD REINF ANTI RL

## (undated) DEVICE — Z INACTIVE USE 2660664 SOLUTION IRRIG 3000ML 0.9% SOD CHL USP UROMATIC PLAS CONT

## (undated) DEVICE — SYSTEM BX CAP BILI RAP EXCHG CAP LOK DEV COMPATIBLE W/ OLY

## (undated) DEVICE — GARMENT,MEDLINE,DVT,INT,CALF,MED, GEN2: Brand: MEDLINE

## (undated) DEVICE — NEEDLE SCLERO 25GA L240CM OD0.51MM ID0.24MM EXTN L4MM SHTH

## (undated) DEVICE — PUMP SUC IRR TBNG L10FT W/ HNDPC ASSEMB STRYKEFLOW 2

## (undated) DEVICE — DRAPE C ARM UNIV W41XL74IN CLR PLAS XR VELC CLSR POLY STRP

## (undated) DEVICE — LAPAROSCOPIC SCISSORS: Brand: EPIX LAPAROSCOPIC SCISSORS

## (undated) DEVICE — SET INSTRUMENT LAP I